# Patient Record
Sex: FEMALE | Race: WHITE | NOT HISPANIC OR LATINO | Employment: OTHER | URBAN - METROPOLITAN AREA
[De-identification: names, ages, dates, MRNs, and addresses within clinical notes are randomized per-mention and may not be internally consistent; named-entity substitution may affect disease eponyms.]

---

## 2017-03-02 ENCOUNTER — ALLSCRIPTS OFFICE VISIT (OUTPATIENT)
Dept: OTHER | Facility: OTHER | Age: 62
End: 2017-03-02

## 2017-03-02 DIAGNOSIS — I10 ESSENTIAL (PRIMARY) HYPERTENSION: ICD-10-CM

## 2017-05-02 DIAGNOSIS — R35.0 FREQUENCY OF MICTURITION: ICD-10-CM

## 2017-08-02 ENCOUNTER — ALLSCRIPTS OFFICE VISIT (OUTPATIENT)
Dept: OTHER | Facility: OTHER | Age: 62
End: 2017-08-02

## 2018-01-12 VITALS
BODY MASS INDEX: 28.93 KG/M2 | WEIGHT: 163.25 LBS | SYSTOLIC BLOOD PRESSURE: 120 MMHG | DIASTOLIC BLOOD PRESSURE: 78 MMHG | HEIGHT: 63 IN

## 2018-01-13 VITALS
BODY MASS INDEX: 27.89 KG/M2 | DIASTOLIC BLOOD PRESSURE: 76 MMHG | WEIGHT: 157.38 LBS | HEIGHT: 63 IN | SYSTOLIC BLOOD PRESSURE: 104 MMHG

## 2020-02-03 ENCOUNTER — OFFICE VISIT (OUTPATIENT)
Dept: FAMILY MEDICINE CLINIC | Facility: CLINIC | Age: 65
End: 2020-02-03
Payer: MEDICARE

## 2020-02-03 VITALS
BODY MASS INDEX: 24.11 KG/M2 | HEART RATE: 78 BPM | HEIGHT: 66 IN | OXYGEN SATURATION: 98 % | WEIGHT: 150 LBS | DIASTOLIC BLOOD PRESSURE: 74 MMHG | TEMPERATURE: 97.9 F | SYSTOLIC BLOOD PRESSURE: 118 MMHG

## 2020-02-03 DIAGNOSIS — F20.9 SCHIZOPHRENIA, UNSPECIFIED TYPE (HCC): ICD-10-CM

## 2020-02-03 DIAGNOSIS — G40.909 SEIZURE DISORDER (HCC): Primary | ICD-10-CM

## 2020-02-03 DIAGNOSIS — I10 ESSENTIAL HYPERTENSION: ICD-10-CM

## 2020-02-03 DIAGNOSIS — R22.0 SWELLING, MASS, OR LUMP ON FACE: ICD-10-CM

## 2020-02-03 PROCEDURE — 3008F BODY MASS INDEX DOCD: CPT | Performed by: FAMILY MEDICINE

## 2020-02-03 PROCEDURE — 3078F DIAST BP <80 MM HG: CPT | Performed by: FAMILY MEDICINE

## 2020-02-03 PROCEDURE — 99214 OFFICE O/P EST MOD 30 MIN: CPT | Performed by: FAMILY MEDICINE

## 2020-02-03 PROCEDURE — 1036F TOBACCO NON-USER: CPT | Performed by: FAMILY MEDICINE

## 2020-02-03 PROCEDURE — 3074F SYST BP LT 130 MM HG: CPT | Performed by: FAMILY MEDICINE

## 2020-02-03 NOTE — PROGRESS NOTES
Assessment/Plan:   will check lab work  Have her follow up with Neurology  Also have her follow-up with general surgery regarding the mass on the left side of her face  Return here in 6 months or as needed  Diagnoses and all orders for this visit:    Seizure disorder [facility])  -     Ambulatory referral to Neurology; Future    Essential hypertension  -     Comprehensive metabolic panel  -     CBC and differential  -     TSH, 3rd generation with Free T4 reflex  -     Carbamazepine level, total; Future    Swelling, mass, or lump on face  -     Ambulatory referral to General Surgery; Future    Schizophrenia, unspecified type (HCC)  -     TSH, 3rd generation with Free T4 reflex  -     Carbamazepine level, total; Future            Subjective:        Patient ID: Olga Pitts is a 60 y o  female  Patient presents with:  Seizures: possible seizure yesterday  Bad slurred speach and blue lips  The EMS examined her and did an EKG  She is still on carbamazepine  She has not seen a neurologist in some time  The following portions of the patient's history were reviewed and updated as appropriate: allergies, current medications, past family history, past medical history, past social history, past surgical history and problem list       Review of Systems   Constitutional: Negative  HENT: Negative  Eyes: Negative  Respiratory: Negative  Cardiovascular: Negative  Gastrointestinal: Negative  Endocrine: Negative  Genitourinary: Negative  Musculoskeletal: Negative  Skin: Negative  Allergic/Immunologic: Negative  Neurological: Negative  Hematological: Negative  Psychiatric/Behavioral: Negative  All other systems reviewed and are negative            Objective:             /74   Pulse 78   Temp 97 9 °F (36 6 °C)   Ht 5' 5 5" (1 664 m)   Wt 68 kg (150 lb)   SpO2 98%   BMI 24 58 kg/m²          Physical Exam   Constitutional: She is oriented to person, place, and time  She appears well-developed and well-nourished  HENT:   Head: Normocephalic and atraumatic  Right Ear: External ear normal    Left Ear: External ear normal    Nose: Nose normal    Mouth/Throat: Oropharynx is clear and moist    Eyes: Pupils are equal, round, and reactive to light  Conjunctivae and EOM are normal    Neck: Normal range of motion  Neck supple  Cardiovascular: Normal rate, regular rhythm and normal heart sounds  Pulmonary/Chest: Effort normal and breath sounds normal    Abdominal: Soft  Bowel sounds are normal    Musculoskeletal: Normal range of motion  Neurological: She is alert and oriented to person, place, and time  She has normal reflexes  No cranial nerve deficit  She exhibits normal muscle tone  Skin: Skin is warm and dry  Psychiatric: She has a normal mood and affect  Her behavior is normal    Nursing note and vitals reviewed

## 2020-02-04 ENCOUNTER — TELEPHONE (OUTPATIENT)
Dept: NEUROLOGY | Facility: CLINIC | Age: 65
End: 2020-02-04

## 2020-02-04 NOTE — TELEPHONE ENCOUNTER
Best contact number for patient: [phone]    Emergency Contact name and number: Genesis Mackey (sister) [phone] or [phone]    Referring provider: Favian Witt    Referring provider Telephone: [phone]    Primary Care Provider Name: Favian Witt    Reason for Appointment/Dx: [facility]    Neurology Location patient would like to be seen: First available    Order received? Yes                                                Records Received? Yes    Have you ever seen another Neurologist? Pt's jenifer said yes, over 20 years ago with LV  Insurance Information    Insurance Name: Medicare    ID/Policy #:    Secondary Insurance:    ID/Policy#: Workman's Comp/ Accident/ School  Information      Workman's Comp/Accident/School related?  No    If yes name of Insurance company:    Date of Injury:    Open Claim:     Name and Telephone Number:    Notes:                   Appointment date: 4/7/2020 with Dr Edwar Campuzano in [facility]

## 2020-02-20 ENCOUNTER — CONSULT (OUTPATIENT)
Dept: PLASTIC SURGERY | Facility: CLINIC | Age: 65
End: 2020-02-20
Payer: MEDICARE

## 2020-02-20 VITALS
DIASTOLIC BLOOD PRESSURE: 97 MMHG | TEMPERATURE: 97.5 F | BODY MASS INDEX: 24.56 KG/M2 | RESPIRATION RATE: 18 BRPM | HEIGHT: 65 IN | WEIGHT: 147.4 LBS | HEART RATE: 95 BPM | SYSTOLIC BLOOD PRESSURE: 135 MMHG

## 2020-02-20 DIAGNOSIS — R22.0 SWELLING, MASS, OR LUMP ON FACE: ICD-10-CM

## 2020-02-20 DIAGNOSIS — R22.9 SUBCUTANEOUS MASS: Primary | ICD-10-CM

## 2020-02-20 PROCEDURE — 99204 OFFICE O/P NEW MOD 45 MIN: CPT | Performed by: SURGERY

## 2020-02-20 NOTE — PROGRESS NOTES
Assessment/Plan:  Please see HPI  The left cheek mass has been present for approximately 2 months, is asymptomatic  I have recommended imaging prior to surgical intervention, an MRI will be arranged to determine there is parotid gland involvement  Her sister is to call our office once the MRI has been completed, it will be reviewed and next steps will be determined based on the MRI findings  Subjective:   Left cheek mass   Patient ID: Renato Jones is a 60 y o  female  ROBIN Baxter is a 81-year-old female, referred by Dr Josie Cheng for evaluation of a left cheek mass  She is accompanied by her sister today who serves as historian/advocate  Per Elvi's  sister, Cornell Drake, the mass has been present for approximately 2 months, it is stable in size, it has not drained, and is asymptomatic  The following portions of the patient's history were reviewed and updated as appropriate: allergies, current medications, past family history, past medical history, past social history, past surgical history and problem list     Review of Systems   Constitutional: Negative for chills and fever  HENT: Negative for hearing loss  Eyes: Positive for visual disturbance  Negative for discharge  Respiratory: Negative for chest tightness and shortness of breath  Cardiovascular: Negative for chest pain and leg swelling  Gastrointestinal: Negative for blood in stool, constipation, diarrhea and nausea  Genitourinary: Negative for dysuria  Musculoskeletal: Negative for gait problem  Skin: Negative for rash  Allergic/Immunologic: Negative for immunocompromised state  Neurological: Positive for seizures  Negative for headaches  Hematological: Does not bruise/bleed easily  Psychiatric/Behavioral: Negative for dysphoric mood  The patient is nervous/anxious            Objective:      /97   Pulse 95   Temp 97 5 °F (36 4 °C)   Resp 18   Ht 5' 5" (1 651 m)   Wt 66 9 kg (147 lb 6 4 oz)   BMI 24 53 kg/m²          Physical Exam   Constitutional: She appears well-developed  HENT:   Head: Normocephalic  Eyes: Pupils are equal, round, and reactive to light  Neck: Normal range of motion  Cardiovascular: Normal rate  Pulmonary/Chest: Effort normal    Abdominal: Soft  Musculoskeletal: Normal range of motion  Neurological: She is alert  Skin: Skin is warm  Left cheek mass at the angle of mandible, 3-3 5 cm in diameter, smooth, firm, nontender, mobile   Psychiatric: She has a normal mood and affect

## 2020-02-20 NOTE — LETTER
February 20, 2020     Moiz White, [address] [address]    Patient: Ar Johnson   YOB: 1955   Date of Visit: 2/20/2020       Dear Dr Vidya Ochoa: Thank you for referring Jewell Steiner to me for evaluation  Below are my notes for this consultation  If you have questions, please do not hesitate to call me  I look forward to following your patient along with you  Sincerely,        Kulwant Fitzgerald MD        CC: No Recipients  Kulwant Fitzgerald MD  2/20/2020  9:39 AM  Incomplete  Assessment/Plan:  Please see HPI  The left cheek mass has been present for approximately 2 months, is asymptomatic  I have recommended imaging prior to surgical intervention, an MRI will be arranged to determine there is parotid gland involvement  Her sister is to call our office once the MRI has been completed, it will be reviewed and next steps will be determined based on the MRI findings  Subjective:   Left cheek mass   Patient ID: Ar Johnson is a 60 y o  female  HPI Artur David is a 24-year-old female, referred by Dr Vidya Ochoa for evaluation of a left cheek mass  She is accompanied by her sister today who serves as historian/advocate  Per Elvi's  sister, John Hickey, the mass has been present for approximately 2 months, it is stable in size, it has not drained, and is asymptomatic  The following portions of the patient's history were reviewed and updated as appropriate: allergies, current medications, past family history, past medical history, past social history, past surgical history and problem list     Review of Systems   Constitutional: Negative for chills and fever  HENT: Negative for hearing loss  Eyes: Positive for visual disturbance  Negative for discharge  Respiratory: Negative for chest tightness and shortness of breath  Cardiovascular: Negative for chest pain and leg swelling     Gastrointestinal: Negative for blood in stool, constipation, diarrhea and nausea  Genitourinary: Negative for dysuria  Musculoskeletal: Negative for gait problem  Skin: Negative for rash  Allergic/Immunologic: Negative for immunocompromised state  Neurological: Positive for seizures  Negative for headaches  Hematological: Does not bruise/bleed easily  Psychiatric/Behavioral: Negative for dysphoric mood  The patient is nervous/anxious  Objective:      /97   Pulse 95   Temp 97 5 °F (36 4 °C)   Resp 18   Ht 5' 5" (1 651 m)   Wt 66 9 kg (147 lb 6 4 oz)   BMI 24 53 kg/m²           Physical Exam   Constitutional: She appears well-developed  HENT:   Head: Normocephalic  Eyes: Pupils are equal, round, and reactive to light  Neck: Normal range of motion  Cardiovascular: Normal rate  Pulmonary/Chest: Effort normal    Abdominal: Soft  Musculoskeletal: Normal range of motion  Neurological: She is alert  Skin: Skin is warm  Left cheek mass at the angle of mandible, 3-3 5 cm in diameter, smooth, firm, nontender, mobile   Psychiatric: She has a normal mood and affect  Juwan Moody MD  2/20/2020  9:39 AM  Sign at close encounter  Assessment/Plan:  Please see HPI  The left cheek mass has been present for approximately 2 months, is asymptomatic  I have recommended imaging prior to surgical intervention, an MRI will be arranged to determine there is parotid gland involvement  Her sister is to call our office once the MRI has been completed, it will be reviewed and next steps will be determined based on the MRI findings  Diagnoses and all orders for this visit:    Swelling, mass, or lump on face  -     Ambulatory referral to General Surgery  -     MRI face wo contrast; Future          Subjective:   Left cheek mass   Patient ID: Pollo Curtis is a 60 y o  female  HPI Yessenia Gagnon is a 56-year-old female, referred by Dr Alexa Thibodeaux for evaluation of a left cheek mass     She is accompanied by her sister today who serves as historian/advocate  Per Elvi's  sister, Adri, the mass has been present for approximately 2 months, it is stable in size, it has not drained, and is asymptomatic  The following portions of the patient's history were reviewed and updated as appropriate: allergies, current medications, past family history, past medical history, past social history, past surgical history and problem list     Review of Systems   Constitutional: Negative for chills and fever  HENT: Negative for hearing loss  Eyes: Positive for visual disturbance  Negative for discharge  Respiratory: Negative for chest tightness and shortness of breath  Cardiovascular: Negative for chest pain and leg swelling  Gastrointestinal: Negative for blood in stool, constipation, diarrhea and nausea  Genitourinary: Negative for dysuria  Musculoskeletal: Negative for gait problem  Skin: Negative for rash  Allergic/Immunologic: Negative for immunocompromised state  Neurological: Positive for seizures  Negative for headaches  Hematological: Does not bruise/bleed easily  Psychiatric/Behavioral: Negative for dysphoric mood  The patient is nervous/anxious  Objective:      /97   Pulse 95   Temp 97 5 °F (36 4 °C)   Resp 18   Ht 5' 5" (1 651 m)   Wt 66 9 kg (147 lb 6 4 oz)   BMI 24 53 kg/m²           Physical Exam   Constitutional: She appears well-developed  HENT:   Head: Normocephalic  Eyes: Pupils are equal, round, and reactive to light  Neck: Normal range of motion  Cardiovascular: Normal rate  Pulmonary/Chest: Effort normal    Abdominal: Soft  Musculoskeletal: Normal range of motion  Neurological: She is alert  Skin: Skin is warm  Left cheek mass at the angle of mandible, 3-3 5 cm in diameter, smooth, firm, nontender, mobile   Psychiatric: She has a normal mood and affect

## 2020-03-01 ENCOUNTER — HOSPITAL ENCOUNTER (OUTPATIENT)
Dept: RADIOLOGY | Facility: HOSPITAL | Age: 65
Discharge: HOME | End: 2020-03-01
Attending: SURGERY
Payer: MEDICARE

## 2020-03-01 DIAGNOSIS — R22.0 SWELLING, MASS, OR LUMP ON FACE: ICD-10-CM

## 2020-03-01 PROCEDURE — 70543 MRI ORBT/FAC/NCK W/O &W/DYE: CPT

## 2020-03-01 PROCEDURE — A9585 GADOBUTROL INJECTION: HCPCS | Performed by: SURGERY

## 2020-03-01 RX ADMIN — GADOBUTROL 7 ML: 604.72 INJECTION INTRAVENOUS at 12:30

## 2020-03-04 ENCOUNTER — TELEPHONE (OUTPATIENT)
Dept: PLASTIC SURGERY | Facility: CLINIC | Age: 65
End: 2020-03-04

## 2020-03-04 DIAGNOSIS — K11.8 PAROTID MASS: Primary | ICD-10-CM

## 2020-03-04 NOTE — TELEPHONE ENCOUNTER
Spoke with patient's sister to give her the information on the referral we put in to ENT  She was supplied with the phone number and names of the providers there  She was very grateful

## 2020-04-28 ENCOUNTER — TELEPHONE (OUTPATIENT)
Dept: NEUROLOGY | Facility: CLINIC | Age: 65
End: 2020-04-28

## 2021-01-14 ENCOUNTER — ANESTHESIA EVENT (INPATIENT)
Dept: PERIOP | Facility: HOSPITAL | Age: 66
DRG: 521 | End: 2021-01-14
Payer: MEDICARE

## 2021-01-14 ENCOUNTER — APPOINTMENT (EMERGENCY)
Dept: RADIOLOGY | Facility: HOSPITAL | Age: 66
DRG: 521 | End: 2021-01-14
Payer: MEDICARE

## 2021-01-14 ENCOUNTER — HOSPITAL ENCOUNTER (INPATIENT)
Facility: HOSPITAL | Age: 66
LOS: 4 days | DRG: 521 | End: 2021-01-18
Attending: EMERGENCY MEDICINE | Admitting: INTERNAL MEDICINE
Payer: MEDICARE
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
## 2021-01-14 DIAGNOSIS — F20.9 SCHIZOPHRENIA, UNSPECIFIED TYPE (HCC): ICD-10-CM

## 2021-01-14 DIAGNOSIS — S72.001A CLOSED FRACTURE OF RIGHT HIP, INITIAL ENCOUNTER (HCC): Primary | ICD-10-CM

## 2021-01-14 PROBLEM — S72.91XA FEMUR FRACTURE, RIGHT (HCC): Status: ACTIVE | Noted: 2021-01-14

## 2021-01-14 PROBLEM — Z01.818 PRE-OP EVALUATION: Status: ACTIVE | Noted: 2021-01-14

## 2021-01-14 LAB
ANION GAP SERPL CALCULATED.3IONS-SCNC: 6 MMOL/L (ref 4–13)
ATRIAL RATE: 82 BPM
BASOPHILS # BLD AUTO: 0.05 THOUSANDS/ΜL (ref 0–0.1)
BASOPHILS NFR BLD AUTO: 0 % (ref 0–1)
BUN SERPL-MCNC: 10 MG/DL (ref 5–25)
CALCIUM SERPL-MCNC: 8.8 MG/DL (ref 8.3–10.1)
CHLORIDE SERPL-SCNC: 101 MMOL/L (ref 100–108)
CO2 SERPL-SCNC: 28 MMOL/L (ref 21–32)
CREAT SERPL-MCNC: 0.62 MG/DL (ref 0.6–1.3)
EOSINOPHIL # BLD AUTO: 0 THOUSAND/ΜL (ref 0–0.61)
EOSINOPHIL NFR BLD AUTO: 0 % (ref 0–6)
ERYTHROCYTE [DISTWIDTH] IN BLOOD BY AUTOMATED COUNT: 13.2 % (ref 11.6–15.1)
GFR SERPL CREATININE-BSD FRML MDRD: 96 ML/MIN/1.73SQ M
GLUCOSE SERPL-MCNC: 100 MG/DL (ref 65–140)
HCT VFR BLD AUTO: 36.7 % (ref 34.8–46.1)
HGB BLD-MCNC: 12.5 G/DL (ref 11.5–15.4)
IMM GRANULOCYTES # BLD AUTO: 0.14 THOUSAND/UL (ref 0–0.2)
IMM GRANULOCYTES NFR BLD AUTO: 1 % (ref 0–2)
LYMPHOCYTES # BLD AUTO: 0.37 THOUSANDS/ΜL (ref 0.6–4.47)
LYMPHOCYTES NFR BLD AUTO: 2 % (ref 14–44)
MCH RBC QN AUTO: 32.8 PG (ref 26.8–34.3)
MCHC RBC AUTO-ENTMCNC: 34.1 G/DL (ref 31.4–37.4)
MCV RBC AUTO: 96 FL (ref 82–98)
MONOCYTES # BLD AUTO: 0.56 THOUSAND/ΜL (ref 0.17–1.22)
MONOCYTES NFR BLD AUTO: 4 % (ref 4–12)
NEUTROPHILS # BLD AUTO: 14.56 THOUSANDS/ΜL (ref 1.85–7.62)
NEUTS SEG NFR BLD AUTO: 93 % (ref 43–75)
NRBC BLD AUTO-RTO: 0 /100 WBCS
P AXIS: 30 DEGREES
PLATELET # BLD AUTO: 207 THOUSANDS/UL (ref 149–390)
PMV BLD AUTO: 8.1 FL (ref 8.9–12.7)
POTASSIUM SERPL-SCNC: 3.7 MMOL/L (ref 3.5–5.3)
PR INTERVAL: 136 MS
QRS AXIS: 33 DEGREES
QRSD INTERVAL: 92 MS
QT INTERVAL: 368 MS
QTC INTERVAL: 429 MS
RBC # BLD AUTO: 3.81 MILLION/UL (ref 3.81–5.12)
SODIUM SERPL-SCNC: 135 MMOL/L (ref 136–145)
T WAVE AXIS: 51 DEGREES
VENTRICULAR RATE: 82 BPM
WBC # BLD AUTO: 15.68 THOUSAND/UL (ref 4.31–10.16)

## 2021-01-14 PROCEDURE — 87040 BLOOD CULTURE FOR BACTERIA: CPT | Performed by: PHYSICIAN ASSISTANT

## 2021-01-14 PROCEDURE — 99223 1ST HOSP IP/OBS HIGH 75: CPT | Performed by: INTERNAL MEDICINE

## 2021-01-14 PROCEDURE — 36415 COLL VENOUS BLD VENIPUNCTURE: CPT | Performed by: PHYSICIAN ASSISTANT

## 2021-01-14 PROCEDURE — 84145 PROCALCITONIN (PCT): CPT | Performed by: PHYSICIAN ASSISTANT

## 2021-01-14 PROCEDURE — 0241U HB NFCT DS VIR RESP RNA 4 TRGT: CPT | Performed by: PHYSICIAN ASSISTANT

## 2021-01-14 PROCEDURE — 80048 BASIC METABOLIC PNL TOTAL CA: CPT | Performed by: PHYSICIAN ASSISTANT

## 2021-01-14 PROCEDURE — 93010 ELECTROCARDIOGRAM REPORT: CPT | Performed by: INTERNAL MEDICINE

## 2021-01-14 PROCEDURE — 99285 EMERGENCY DEPT VISIT HI MDM: CPT | Performed by: PHYSICIAN ASSISTANT

## 2021-01-14 PROCEDURE — 96374 THER/PROPH/DIAG INJ IV PUSH: CPT

## 2021-01-14 PROCEDURE — 83605 ASSAY OF LACTIC ACID: CPT | Performed by: PHYSICIAN ASSISTANT

## 2021-01-14 PROCEDURE — 73502 X-RAY EXAM HIP UNI 2-3 VIEWS: CPT

## 2021-01-14 PROCEDURE — 85025 COMPLETE CBC W/AUTO DIFF WBC: CPT | Performed by: PHYSICIAN ASSISTANT

## 2021-01-14 PROCEDURE — 71045 X-RAY EXAM CHEST 1 VIEW: CPT

## 2021-01-14 PROCEDURE — 99285 EMERGENCY DEPT VISIT HI MDM: CPT

## 2021-01-14 PROCEDURE — 93005 ELECTROCARDIOGRAM TRACING: CPT

## 2021-01-14 PROCEDURE — 73552 X-RAY EXAM OF FEMUR 2/>: CPT

## 2021-01-14 PROCEDURE — 81003 URINALYSIS AUTO W/O SCOPE: CPT | Performed by: PHYSICIAN ASSISTANT

## 2021-01-14 RX ORDER — CARBAMAZEPINE 200 MG/1
400 TABLET ORAL 2 TIMES DAILY
COMMUNITY

## 2021-01-14 RX ORDER — LIDOCAINE 50 MG/G
1 PATCH TOPICAL ONCE
Status: COMPLETED | OUTPATIENT
Start: 2021-01-14 | End: 2021-01-15

## 2021-01-14 RX ORDER — OLANZAPINE 10 MG/1
40 TABLET ORAL
Status: DISCONTINUED | OUTPATIENT
Start: 2021-01-14 | End: 2021-01-18 | Stop reason: HOSPADM

## 2021-01-14 RX ORDER — ACETAMINOPHEN 325 MG/1
650 TABLET ORAL ONCE
Status: DISCONTINUED | OUTPATIENT
Start: 2021-01-14 | End: 2021-01-14

## 2021-01-14 RX ORDER — ACETAMINOPHEN 325 MG/1
650 TABLET ORAL EVERY 6 HOURS PRN
Status: DISCONTINUED | OUTPATIENT
Start: 2021-01-14 | End: 2021-01-15

## 2021-01-14 RX ORDER — OXYCODONE HYDROCHLORIDE 5 MG/1
2.5 TABLET ORAL EVERY 4 HOURS PRN
Status: DISCONTINUED | OUTPATIENT
Start: 2021-01-14 | End: 2021-01-18 | Stop reason: HOSPADM

## 2021-01-14 RX ORDER — FENTANYL CITRATE 50 UG/ML
25 INJECTION, SOLUTION INTRAMUSCULAR; INTRAVENOUS ONCE
Status: COMPLETED | OUTPATIENT
Start: 2021-01-14 | End: 2021-01-14

## 2021-01-14 RX ORDER — CARBAMAZEPINE 200 MG/1
400 TABLET ORAL 2 TIMES DAILY
Status: DISCONTINUED | OUTPATIENT
Start: 2021-01-14 | End: 2021-01-18 | Stop reason: HOSPADM

## 2021-01-14 RX ORDER — CEFAZOLIN SODIUM 2 G/50ML
2000 SOLUTION INTRAVENOUS
Status: COMPLETED | OUTPATIENT
Start: 2021-01-15 | End: 2021-01-15

## 2021-01-14 RX ADMIN — CARBAMAZEPINE 400 MG: 200 TABLET ORAL at 21:55

## 2021-01-14 RX ADMIN — OXYCODONE HYDROCHLORIDE 2.5 MG: 5 TABLET ORAL at 21:55

## 2021-01-14 RX ADMIN — MORPHINE SULFATE 1 MG: 2 INJECTION, SOLUTION INTRAMUSCULAR; INTRAVENOUS at 20:18

## 2021-01-14 RX ADMIN — OLANZAPINE 40 MG: 10 TABLET, FILM COATED ORAL at 21:55

## 2021-01-14 RX ADMIN — LIDOCAINE 1 PATCH: 50 PATCH TOPICAL at 14:47

## 2021-01-14 RX ADMIN — OXYCODONE HYDROCHLORIDE 2.5 MG: 5 TABLET ORAL at 18:06

## 2021-01-14 RX ADMIN — FENTANYL CITRATE 25 MCG: 50 INJECTION INTRAMUSCULAR; INTRAVENOUS at 15:02

## 2021-01-14 NOTE — H&P
H&P- Juan C Webb 1955, 63 y o  female MRN: [id number]    Unit/Bed#: E2 -71 Encounter: [id number]    Primary Care Provider: Nae Sultana DO   Date and time admitted to hospital: 1/14/2021  1:19 PM        * Femur fracture, right [facility])  Assessment & Plan  This is a 62-year-old female with history of schizophrenia and seizure disorder presented after suffering a mechanical fall at home when she tripped over her dog  · X-ray revealed right femoral neck fracture  · Orthopedic consulted  · Plan for OR tomorrow  · Continue with pain control  · PT/OT after OR    Pre-op evaluation  Assessment & Plan  This is a 62-year-old female with history of schizophrenia and seizure disorder 20 after serving a mechanical fall at home  As a right femur fracture  No known history of any CAD, arrhythmias, denies any chest pain  Chest x-ray and EKG reviewed  Patient can proceed to OR without further workup at this point    Seizure disorder [facility])  Assessment & Plan  · Continue Tegretol    Schizophrenia ([facility] [address] )  Assessment & Plan  · Continue Zyprexa      Updated patient's sister who states she is the POA, via telephone      VTE Prophylaxis: Enoxaparin (Lovenox)  / sequential compression device   Code Status: DNR/DNI--discussed with sister  POLST: There is no POLST form on file for this patient (pre-hospital)    Anticipated Length of Stay:  Patient will be admitted on an Inpatient basis with an anticipated length of stay of  greater than 2 midnights  Justification for Hospital Stay:  Right femur fracture    Total Time for Visit, including Counseling / Coordination of Care: 30 minutes  Greater than 50% of this total time spent on direct patient counseling and coordination of care  Chief Complaint:   Mechanical fall    History of Present Illness:    Juan C Webb is a 63 y o  female who presents with mechanical fall at home    Patient history of schizophrenia, seizure disorder presenting after suffering a mechanical fall at home when she tripped over her dog  Suffered a right femoral neck fracture  Denies any chest pain, palpitation, December denies any abdominal pain, nausea, vomiting  Denies any fever, chills  Patient will be admitted for further management and evaluation  Review of Systems:    Review of Systems   Constitutional: Negative  HENT: Negative  Eyes: Negative  Respiratory: Negative  Cardiovascular: Negative  Gastrointestinal: Negative  Endocrine: Negative  Genitourinary: Negative  Musculoskeletal: Positive for joint swelling  Skin: Negative  Allergic/Immunologic: Negative  Neurological: Negative  Hematological: Negative  Psychiatric/Behavioral: Negative  Past Medical and Surgical History:     Past Medical History:   Diagnosis Date    Schizophrenia [facility])        Past Surgical History:   Procedure Laterality Date    BRAIN SURGERY         Meds/Allergies:    Prior to Admission medications    Medication Sig Start Date End Date Taking? Authorizing Provider   carBAMazepine (TEGretol) 200 mg tablet Take 400 mg by mouth 2 (two) times a day   Yes Historical Provider, MD   OLANZapine (ZYPREXA) 20 MG tablet Take 40 mg by mouth daily at bedtime  4/5/11   Historical Provider, MD   carBAMazepine (CARBATROL) 300 MG 12 hr capsule Take 1 capsule by mouth 2 (two) times a day  1/14/21  Historical Provider, MD   LORazepam (ATIVAN) 1 mg tablet Take 1 tablet by mouth 2 (two) times a day  1/14/21  Historical Provider, MD     I have reviewed home medications with patient family member  Allergies:    Allergies   Allergen Reactions    Penicillins        Social History:     Social History     Substance and Sexual Activity   Alcohol Use Not Currently     Social History     Tobacco Use   Smoking Status Never Smoker   Smokeless Tobacco Never Used   Tobacco Comment    onset date: 8/2/2017      Social History     Substance and Sexual Activity   Drug Use Never       Family History:    Family History   Problem Relation Age of Onset    Other Mother         Cardiac Arrest     Other Father         cardiac Arrest        Physical Exam:     Vitals:   Blood Pressure: 155/96 (01/14/21 1519)  Pulse: 85 (01/14/21 1519)  Temperature: 98 7 °F (37 1 °C) (01/14/21 1519)  Temp Source: Oral (01/14/21 1519)  Respirations: 16 (01/14/21 1519)  Weight - Scale: 66 kg (145 lb 8 1 oz) (01/14/21 1334)  SpO2: 98 % (01/14/21 1519)    Constitutional: Patient is oriented to person, place and time, no acute distress  HEENT:  Normocephalic, atraumatic  Cardiovascular: Normal S1S2, RRR, No murmurs/rubs/gallops appreciated  Pulmonary:  Bilateral air entry, No rhonchi/rales/wheezing appreciated  Abdominal: Soft, Bowel sounds present, Non-tender, Non-distended  Extremities:  Right lower extremity shortened and externally rotated  Neurological: Cranial nerves II-XII grossly intact, sensation intact, otherwise no focal neurological symptoms  Additional Data:     Lab Results: I have personally reviewed pertinent reports  Results from last 7 days   Lab Units 01/14/21  1501   WBC Thousand/uL 15 68*   HEMOGLOBIN g/dL 12 5   HEMATOCRIT % 36 7   PLATELETS Thousands/uL 207   NEUTROS PCT % 93*   LYMPHS PCT % 2*   MONOS PCT % 4   EOS PCT % 0     Results from last 7 days   Lab Units 01/14/21  1501   POTASSIUM mmol/L 3 7   CHLORIDE mmol/L 101   CO2 mmol/L 28   BUN mg/dL 10   CREATININE mg/dL 0 62   CALCIUM mg/dL 8 8           Imaging: I have personally reviewed pertinent reports  Xr Hip/pelv 2-3 Vws Right    Result Date: 1/14/2021  Narrative: RIGHT HIP INDICATION:   hip pain after fall  COMPARISON:  None VIEWS:  XR HIP/PELV 2-3 VWS RIGHT W PELVIS IF PERFORMED FINDINGS: Femoral neck fracture is present  No dislocation  Moderate left and mild right hip osteoarthritis are present  No lytic or blastic osseous lesion  There are atherosclerotic calcifications  Soft tissues are otherwise unremarkable   The visualized lumbar spine is unremarkable  Impression: Right femoral neck fracture  Findings concur with the preliminary report by the referring clinician already in PACS and/or our electronic record EPIC  Workstation performed: [id number]     Xr Femur 2 Views Right    Result Date: 1/14/2021  Narrative: RIGHT FEMUR INDICATION:   right thigh pain after fall  COMPARISON:  None VIEWS:  XR FEMUR 2 VW RIGHT FINDINGS: Femoral neck fracture is present  Mild right hip and marked right knee osteoarthritis are present  No lytic or blastic osseous lesion  Soft tissues are unremarkable  Impression: Right femoral neck fracture  Findings concur with the preliminary report by the referring clinician already in PACS and/or our electronic record EPIC  Workstation performed: [id number]     Xr Chest 1 View    Result Date: 1/14/2021  Narrative: CHEST INDICATION:   FALL THIS MORNING   COMPARISON:  None EXAM PERFORMED/VIEWS:  XR CHEST 1 VIEW FINDINGS:  There is aortic knob calcification  Cardiomediastinal silhouette appears unremarkable  The lungs are clear  No pneumothorax or pleural effusion  Osseous structures appear within normal limits for patient age  Impression: No focal airspace consolidation identified  Workstation performed: [id number]       EKG, Pathology, and Other Studies Reviewed on Admission:   · EKG:  Sinus rhythm without any acute ST changes    Allscripts / Epic Records Reviewed: Yes     ** Please Note: This note has been constructed using a voice recognition system   **

## 2021-01-14 NOTE — PLAN OF CARE
Problem: PAIN - ADULT  Goal: Verbalizes/displays adequate comfort level or baseline comfort level  Description: Interventions:  - Encourage patient to monitor pain and request assistance  - Assess pain using appropriate pain scale  - Administer analgesics based on type and severity of pain and evaluate response  - Implement non-pharmacological measures as appropriate and evaluate response  - Consider cultural and social influences on pain and pain management  - Notify physician/advanced practitioner if interventions unsuccessful or patient reports new pain  Outcome: Progressing     Problem: INFECTION - ADULT  Goal: Absence or prevention of progression during hospitalization  Description: INTERVENTIONS:  - Assess and monitor for signs and symptoms of infection  - Monitor lab/diagnostic results  - Monitor all insertion sites, i e  indwelling lines, tubes, and drains  - Monitor endotracheal if appropriate and nasal secretions for changes in amount and color  - [place] appropriate cooling/warming therapies per order  - Administer medications as ordered  - Instruct and encourage patient and family to use good hand hygiene technique  - Identify and instruct in appropriate isolation precautions for identified infection/condition  Outcome: Progressing  Goal: Absence of fever/infection during neutropenic period  Description: INTERVENTIONS:  - Monitor WBC    Outcome: Progressing     Problem: SAFETY ADULT  Goal: Patient will remain free of falls  Description: INTERVENTIONS:  - Assess patient frequently for physical needs  -  Identify cognitive and physical deficits and behaviors that affect risk of falls    -  [place] fall precautions as indicated by assessment   - Educate patient/family on patient safety including physical limitations  - Instruct patient to call for assistance with activity based on assessment  - Modify environment to reduce risk of injury  - Consider OT/PT consult to assist with strengthening/mobility  Outcome: Progressing  Goal: Maintain or return to baseline ADL function  Description: INTERVENTIONS:  -  Assess patient's ability to carry out ADLs; assess patient's baseline for ADL function and identify physical deficits which impact ability to perform ADLs (bathing, care of mouth/teeth, toileting, grooming, dressing, etc )  - Assess/evaluate cause of self-care deficits   - Assess range of motion  - Assess patient's mobility; develop plan if impaired  - Assess patient's need for assistive devices and provide as appropriate  - Encourage maximum independence but intervene and supervise when necessary  - Involve family in performance of ADLs  - Assess for home care needs following discharge   - Consider OT consult to assist with ADL evaluation and planning for discharge  - Provide patient education as appropriate  Outcome: Progressing  Goal: Maintain or return mobility status to optimal level  Description: INTERVENTIONS:  - Assess patient's baseline mobility status (ambulation, transfers, stairs, etc )    - Identify cognitive and physical deficits and behaviors that affect mobility  - Identify mobility aids required to assist with transfers and/or ambulation (gait belt, sit-to-stand, lift, walker, cane, etc )  - [place] fall precautions as indicated by assessment  - Record patient progress and toleration of activity level on Mobility SBAR; progress patient to next Phase/Stage  - Instruct patient to call for assistance with activity based on assessment  - Consider rehabilitation consult to assist with strengthening/weightbearing, etc   Outcome: Progressing     Problem: DISCHARGE PLANNING  Goal: Discharge to home or other facility with appropriate resources  Description: INTERVENTIONS:  - Identify barriers to discharge w/patient and caregiver  - Arrange for needed discharge resources and transportation as appropriate  - Identify discharge learning needs (meds, wound care, etc )  - Arrange for interpretive services to assist at discharge as needed  - Refer to Case Management Department for coordinating discharge planning if the patient needs post-hospital services based on physician/advanced practitioner order or complex needs related to functional status, cognitive ability, or social support system  Outcome: Progressing     Problem: Knowledge Deficit  Goal: Patient/family/caregiver demonstrates understanding of disease process, treatment plan, medications, and discharge instructions  Description: Complete learning assessment and assess knowledge base    Interventions:  - Provide teaching at level of understanding  - Provide teaching via preferred learning methods  Outcome: Progressing

## 2021-01-14 NOTE — ASSESSMENT & PLAN NOTE
This is a 80-year-old female with history of schizophrenia and seizure disorder presented after suffering a mechanical fall at home when she tripped over her dog      · X-ray revealed right femoral neck fracture  · Orthopedic consulted  · Plan for OR tomorrow  · Continue with pain control  · PT/OT after OR

## 2021-01-14 NOTE — ED PROVIDER NOTES
History  Chief Complaint   Patient presents with    Fall     Patient tripped over her dog and fell  States she landed on her right hip  Denies striking her head  Patient is a 62 y/o female presenting to the ED with right hip pain after a fall  Patient reports tripping over her dog and falling onto right hip  She denies any  Dizziness, chest pain or SOB precipitating the fall; denies head strike, blood thinners, loss of consciousness, neck/back pain or any additional injuries  Patient's sister was able to assist patient off the floor and into a chair, but called EMS as patient was unable to bear weight after fall  Patient localizes the pain to the lateral aspect of right upper thigh and rates it at a 10/10  Pain has been constant since fall and is worse with movement  She denies numbness or tingling in affected extremity  She denies headache, dizziness, N/V or visual changes since the fall  Prior to Admission Medications   Prescriptions Last Dose Informant Patient Reported? Taking? OLANZapine (ZYPREXA) 20 MG tablet   Yes No   Sig: Take 40 mg by mouth daily at bedtime    carBAMazepine (TEGretol) 200 mg tablet   Yes Yes   Sig: Take 400 mg by mouth 2 (two) times a day      Facility-Administered Medications: None       Past Medical History:   Diagnosis Date    Schizophrenia [facility])        Past Surgical History:   Procedure Laterality Date    BRAIN SURGERY         Family History   Problem Relation Age of Onset    Other Mother         Cardiac Arrest     Other Father         cardiac Arrest      I have reviewed and agree with the history as documented  E-Cigarette/Vaping     E-Cigarette/Vaping Substances     Social History     Tobacco Use    Smoking status: Never Smoker    Smokeless tobacco: Never Used    Tobacco comment: onset date: 8/2/2017    Substance Use Topics    Alcohol use: Not Currently    Drug use: Never       Review of Systems   Constitutional: Negative for chills and fever  Respiratory: Negative for cough and shortness of breath  Cardiovascular: Negative for chest pain  Gastrointestinal: Negative for abdominal pain, constipation, diarrhea, nausea and vomiting  Musculoskeletal: Negative for back pain and neck pain  Neurological: Negative for dizziness, syncope and headaches  All other systems reviewed and are negative  Physical Exam  Physical Exam  Vitals signs and nursing note reviewed  Constitutional:       General: She is awake  Appearance: Normal appearance  She is well-developed  HENT:      Head: Normocephalic and atraumatic  No abrasion, contusion or laceration  Right Ear: No drainage  Left Ear: No drainage  Nose: Nose normal       Mouth/Throat:      Lips: Pink  Eyes:      Extraocular Movements: Extraocular movements intact  Conjunctiva/sclera: Conjunctivae normal       Pupils: Pupils are equal, round, and reactive to light  Neck:      Musculoskeletal: Normal range of motion and neck supple  No pain with movement, spinous process tenderness or muscular tenderness  Cardiovascular:      Rate and Rhythm: Normal rate and regular rhythm  Pulses:           Dorsalis pedis pulses are 2+ on the right side  Heart sounds: Normal heart sounds, S1 normal and S2 normal    Pulmonary:      Effort: Pulmonary effort is normal       Breath sounds: Normal breath sounds  Musculoskeletal:      Right hip: She exhibits decreased range of motion, tenderness and bony tenderness  She exhibits no swelling, no deformity and no laceration  Left hip: Normal       Right knee: Normal       Cervical back: She exhibits no tenderness, no bony tenderness, no deformity and no pain  Thoracic back: She exhibits no tenderness, no bony tenderness, no deformity and no pain  Lumbar back: She exhibits no tenderness, no bony tenderness, no deformity and no pain        Comments: Right hip: no ecchymosis or deformities noted; no shortening or external rotation of extremity; tenderness to palpation of lateral aspect of upper 1/3 of femur; neurovascularly intact distal to injury; good strength in foot but limited strength in leg secondary to pain  Spine: no midline tenderness, step-off's or deformities  Skin:     General: Skin is warm and dry  Neurological:      Mental Status: She is alert and oriented to person, place, and time  Mental status is at baseline  GCS: GCS eye subscore is 4  GCS verbal subscore is 5  GCS motor subscore is 6  Motor: Motor function is intact  Psychiatric:         Behavior: Behavior is cooperative  Vital Signs  ED Triage Vitals [01/14/21 1328]   Temperature Pulse Respirations Blood Pressure SpO2   100 2 °F (37 9 °C) 88 16 (!) 200/102 96 %      Temp Source Heart Rate Source Patient Position - Orthostatic VS BP Location FiO2 (%)   Temporal Monitor Sitting Right arm --      Pain Score       Worst Possible Pain           Vitals:    01/14/21 1328   BP: (!) 200/102   Pulse: 88   Patient Position - Orthostatic VS: Sitting         Visual Acuity      ED Medications  Medications   lidocaine (LIDODERM) 5 % patch 1 patch (1 patch Topical Medication Applied 1/14/21 1447)   fentanyl citrate (PF) 100 MCG/2ML 25 mcg (25 mcg Intravenous Given 1/14/21 1502)       Diagnostic Studies  Results Reviewed     Procedure Component Value Units Date/Time    CBC and differential [[id number]]  (Abnormal) Collected: 01/14/21 1501    Lab Status: Preliminary result Specimen: Blood from Arm, Right Updated: 01/14/21 1505     WBC 15 68 Thousand/uL      RBC 3 81 Million/uL      Hemoglobin 12 5 g/dL      Hematocrit 36 7 %      MCV 96 fL      MCH 32 8 pg      MCHC 34 1 g/dL      RDW 13 2 %      MPV 8 1 fL      Platelets 188 Thousands/uL     Basic metabolic panel [[id number]] Collected: 01/14/21 1501    Lab Status:  In process Specimen: Blood from Arm, Right Updated: 01/14/21 1503                 XR hip/pelv 2-3 vws right    (Results Pending)   XR femur 2 views RIGHT    (Results Pending)   XR chest 1 view    (Results Pending)              Procedures  Procedures         ED Course  ED Course as of Jan 14 1511   Thu Jan 14, 2021   1501 Talked to Penaloza [place] from ortho  Recommended admission to [place] and plan for surgery tomorrow  MDM  Number of Diagnoses or Management Options  Closed fracture of right hip, initial encounter [facility]):   Fall, initial encounter:   Diagnosis management comments: Discussed results with patient  Patient verbalized understanding and is agreeable to admission and plan for surgery tomorrow  Disposition  Final diagnoses:   None     ED Disposition     None      Follow-up Information    None         Patient's Medications   Discharge Prescriptions    No medications on file     No discharge procedures on file      PDMP Review     None          ED Provider  Electronically Signed by           Zoe Rand PA-C  01/14/21 8165

## 2021-01-14 NOTE — ASSESSMENT & PLAN NOTE
This is a 63-year-old female with history of schizophrenia and seizure disorder 20 after serving a mechanical fall at home  As a right femur fracture  No known history of any CAD, arrhythmias, denies any chest pain  Chest x-ray and EKG reviewed    Patient can proceed to OR without further workup at this point

## 2021-01-15 ENCOUNTER — APPOINTMENT (INPATIENT)
Dept: RADIOLOGY | Facility: HOSPITAL | Age: 66
DRG: 521 | End: 2021-01-15
Payer: MEDICARE

## 2021-01-15 ENCOUNTER — ANESTHESIA (INPATIENT)
Dept: PERIOP | Facility: HOSPITAL | Age: 66
DRG: 521 | End: 2021-01-15
Payer: MEDICARE

## 2021-01-15 VITALS — HEART RATE: 95 BPM

## 2021-01-15 PROBLEM — E87.6 HYPOKALEMIA: Status: ACTIVE | Noted: 2021-01-15

## 2021-01-15 PROBLEM — E87.1 HYPONATREMIA: Status: ACTIVE | Noted: 2021-01-15

## 2021-01-15 PROBLEM — R50.9 FEVER: Status: ACTIVE | Noted: 2021-01-15

## 2021-01-15 LAB
ABO GROUP BLD: NORMAL
ABO GROUP BLD: NORMAL
ALBUMIN SERPL BCP-MCNC: 3.1 G/DL (ref 3.5–5)
ALP SERPL-CCNC: 73 U/L (ref 46–116)
ALT SERPL W P-5'-P-CCNC: 21 U/L (ref 12–78)
ANION GAP SERPL CALCULATED.3IONS-SCNC: 8 MMOL/L (ref 4–13)
AST SERPL W P-5'-P-CCNC: 17 U/L (ref 5–45)
BASOPHILS # BLD AUTO: 0.03 THOUSANDS/ΜL (ref 0–0.1)
BASOPHILS NFR BLD AUTO: 0 % (ref 0–1)
BILIRUB SERPL-MCNC: 0.89 MG/DL (ref 0.2–1)
BILIRUB UR QL STRIP: NEGATIVE
BLD GP AB SCN SERPL QL: NEGATIVE
BUN SERPL-MCNC: 9 MG/DL (ref 5–25)
CALCIUM ALBUM COR SERPL-MCNC: 9 MG/DL (ref 8.3–10.1)
CALCIUM SERPL-MCNC: 8.3 MG/DL (ref 8.3–10.1)
CHLORIDE SERPL-SCNC: 97 MMOL/L (ref 100–108)
CLARITY UR: CLEAR
CO2 SERPL-SCNC: 27 MMOL/L (ref 21–32)
COLOR UR: YELLOW
CREAT SERPL-MCNC: 0.62 MG/DL (ref 0.6–1.3)
EOSINOPHIL # BLD AUTO: 0 THOUSAND/ΜL (ref 0–0.61)
EOSINOPHIL NFR BLD AUTO: 0 % (ref 0–6)
ERYTHROCYTE [DISTWIDTH] IN BLOOD BY AUTOMATED COUNT: 13.2 % (ref 11.6–15.1)
FLUAV RNA RESP QL NAA+PROBE: NEGATIVE
FLUBV RNA RESP QL NAA+PROBE: NEGATIVE
GFR SERPL CREATININE-BSD FRML MDRD: 96 ML/MIN/1.73SQ M
GLUCOSE SERPL-MCNC: 96 MG/DL (ref 65–140)
GLUCOSE UR STRIP-MCNC: NEGATIVE MG/DL
HCT VFR BLD AUTO: 33.2 % (ref 34.8–46.1)
HGB BLD-MCNC: 11.4 G/DL (ref 11.5–15.4)
HGB UR QL STRIP.AUTO: NEGATIVE
IMM GRANULOCYTES # BLD AUTO: 0.03 THOUSAND/UL (ref 0–0.2)
IMM GRANULOCYTES NFR BLD AUTO: 0 % (ref 0–2)
KETONES UR STRIP-MCNC: NEGATIVE MG/DL
LACTATE SERPL-SCNC: 0.8 MMOL/L (ref 0.5–2)
LEUKOCYTE ESTERASE UR QL STRIP: NEGATIVE
LYMPHOCYTES # BLD AUTO: 0.5 THOUSANDS/ΜL (ref 0.6–4.47)
LYMPHOCYTES NFR BLD AUTO: 6 % (ref 14–44)
MCH RBC QN AUTO: 32.8 PG (ref 26.8–34.3)
MCHC RBC AUTO-ENTMCNC: 34.3 G/DL (ref 31.4–37.4)
MCV RBC AUTO: 95 FL (ref 82–98)
MONOCYTES # BLD AUTO: 0.65 THOUSAND/ΜL (ref 0.17–1.22)
MONOCYTES NFR BLD AUTO: 8 % (ref 4–12)
NEUTROPHILS # BLD AUTO: 6.95 THOUSANDS/ΜL (ref 1.85–7.62)
NEUTS SEG NFR BLD AUTO: 86 % (ref 43–75)
NITRITE UR QL STRIP: NEGATIVE
NRBC BLD AUTO-RTO: 0 /100 WBCS
PH UR STRIP.AUTO: 7.5 [PH]
PLATELET # BLD AUTO: 193 THOUSANDS/UL (ref 149–390)
PMV BLD AUTO: 8.7 FL (ref 8.9–12.7)
POTASSIUM SERPL-SCNC: 3.3 MMOL/L (ref 3.5–5.3)
PROCALCITONIN SERPL-MCNC: 0.08 NG/ML
PROT SERPL-MCNC: 6.1 G/DL (ref 6.4–8.2)
PROT UR STRIP-MCNC: NEGATIVE MG/DL
RBC # BLD AUTO: 3.48 MILLION/UL (ref 3.81–5.12)
RH BLD: POSITIVE
RH BLD: POSITIVE
RSV RNA RESP QL NAA+PROBE: NEGATIVE
SARS-COV-2 RNA RESP QL NAA+PROBE: NEGATIVE
SODIUM SERPL-SCNC: 132 MMOL/L (ref 136–145)
SP GR UR STRIP.AUTO: 1.01 (ref 1–1.03)
SPECIMEN EXPIRATION DATE: NORMAL
UROBILINOGEN UR QL STRIP.AUTO: 0.2 E.U./DL
WBC # BLD AUTO: 8.16 THOUSAND/UL (ref 4.31–10.16)

## 2021-01-15 PROCEDURE — 27236 TREAT THIGH FRACTURE: CPT | Performed by: PHYSICIAN ASSISTANT

## 2021-01-15 PROCEDURE — C1776 JOINT DEVICE (IMPLANTABLE): HCPCS | Performed by: ORTHOPAEDIC SURGERY

## 2021-01-15 PROCEDURE — 99232 SBSQ HOSP IP/OBS MODERATE 35: CPT | Performed by: INTERNAL MEDICINE

## 2021-01-15 PROCEDURE — 86900 BLOOD TYPING SEROLOGIC ABO: CPT | Performed by: PHYSICIAN ASSISTANT

## 2021-01-15 PROCEDURE — G9197 ORDER FOR CEPH: HCPCS | Performed by: ORTHOPAEDIC SURGERY

## 2021-01-15 PROCEDURE — 80053 COMPREHEN METABOLIC PANEL: CPT | Performed by: INTERNAL MEDICINE

## 2021-01-15 PROCEDURE — 27236 TREAT THIGH FRACTURE: CPT | Performed by: ORTHOPAEDIC SURGERY

## 2021-01-15 PROCEDURE — 85025 COMPLETE CBC W/AUTO DIFF WBC: CPT | Performed by: INTERNAL MEDICINE

## 2021-01-15 PROCEDURE — 86901 BLOOD TYPING SEROLOGIC RH(D): CPT | Performed by: PHYSICIAN ASSISTANT

## 2021-01-15 PROCEDURE — 73502 X-RAY EXAM HIP UNI 2-3 VIEWS: CPT

## 2021-01-15 PROCEDURE — 86850 RBC ANTIBODY SCREEN: CPT | Performed by: PHYSICIAN ASSISTANT

## 2021-01-15 PROCEDURE — 99223 1ST HOSP IP/OBS HIGH 75: CPT | Performed by: PHYSICAL MEDICINE & REHABILITATION

## 2021-01-15 PROCEDURE — 0SRR01A REPLACEMENT OF RIGHT HIP JOINT, FEMORAL SURFACE WITH METAL SYNTHETIC SUBSTITUTE, UNCEMENTED, OPEN APPROACH: ICD-10-PCS | Performed by: ORTHOPAEDIC SURGERY

## 2021-01-15 PROCEDURE — 99223 1ST HOSP IP/OBS HIGH 75: CPT | Performed by: ORTHOPAEDIC SURGERY

## 2021-01-15 DEVICE — MODULAR CATHCART TAPERED SPACER 12/14 TAPER +0MM: Type: IMPLANTABLE DEVICE | Site: HIP | Status: FUNCTIONAL

## 2021-01-15 DEVICE — MODULAR CATHCART FRACTURE HEAD HIP BALL 47MM OD +5MM: Type: IMPLANTABLE DEVICE | Site: HIP | Status: FUNCTIONAL

## 2021-01-15 DEVICE — TRI-LOCK BPS FEMORAL STEM 12/14 TAPER TRI-LOCK BPS W/GRIPTION SIZE 4 HI 103MM
Type: IMPLANTABLE DEVICE | Site: HIP | Status: FUNCTIONAL
Brand: TRI-LOCK GRIPTION

## 2021-01-15 RX ORDER — SENNOSIDES 8.6 MG
1 TABLET ORAL DAILY
Status: DISCONTINUED | OUTPATIENT
Start: 2021-01-16 | End: 2021-01-18 | Stop reason: HOSPADM

## 2021-01-15 RX ORDER — FENTANYL CITRATE/PF 50 MCG/ML
25 SYRINGE (ML) INJECTION
Status: DISCONTINUED | OUTPATIENT
Start: 2021-01-15 | End: 2021-01-15 | Stop reason: HOSPADM

## 2021-01-15 RX ORDER — EPHEDRINE SULFATE 50 MG/ML
INJECTION INTRAVENOUS AS NEEDED
Status: DISCONTINUED | OUTPATIENT
Start: 2021-01-15 | End: 2021-01-15

## 2021-01-15 RX ORDER — HYDROMORPHONE HCL/PF 1 MG/ML
0.5 SYRINGE (ML) INJECTION
Status: DISCONTINUED | OUTPATIENT
Start: 2021-01-15 | End: 2021-01-15 | Stop reason: HOSPADM

## 2021-01-15 RX ORDER — SODIUM CHLORIDE 9 MG/ML
75 INJECTION, SOLUTION INTRAVENOUS CONTINUOUS
Status: DISCONTINUED | OUTPATIENT
Start: 2021-01-15 | End: 2021-01-15

## 2021-01-15 RX ORDER — NEOSTIGMINE METHYLSULFATE 1 MG/ML
INJECTION INTRAVENOUS AS NEEDED
Status: DISCONTINUED | OUTPATIENT
Start: 2021-01-15 | End: 2021-01-15

## 2021-01-15 RX ORDER — ACETAMINOPHEN 325 MG/1
650 TABLET ORAL EVERY 6 HOURS PRN
Status: DISCONTINUED | OUTPATIENT
Start: 2021-01-15 | End: 2021-01-18 | Stop reason: HOSPADM

## 2021-01-15 RX ORDER — ASCORBIC ACID 500 MG
500 TABLET ORAL 2 TIMES DAILY
Status: DISCONTINUED | OUTPATIENT
Start: 2021-01-15 | End: 2021-01-18 | Stop reason: HOSPADM

## 2021-01-15 RX ORDER — MIDAZOLAM HYDROCHLORIDE 2 MG/2ML
INJECTION, SOLUTION INTRAMUSCULAR; INTRAVENOUS AS NEEDED
Status: DISCONTINUED | OUTPATIENT
Start: 2021-01-15 | End: 2021-01-15

## 2021-01-15 RX ORDER — POTASSIUM CHLORIDE 20 MEQ/1
40 TABLET, EXTENDED RELEASE ORAL ONCE
Status: COMPLETED | OUTPATIENT
Start: 2021-01-15 | End: 2021-01-15

## 2021-01-15 RX ORDER — FERROUS SULFATE 325(65) MG
325 TABLET ORAL 2 TIMES DAILY WITH MEALS
Status: DISCONTINUED | OUTPATIENT
Start: 2021-01-15 | End: 2021-01-18 | Stop reason: HOSPADM

## 2021-01-15 RX ORDER — ONDANSETRON 2 MG/ML
4 INJECTION INTRAMUSCULAR; INTRAVENOUS ONCE AS NEEDED
Status: DISCONTINUED | OUTPATIENT
Start: 2021-01-15 | End: 2021-01-15 | Stop reason: HOSPADM

## 2021-01-15 RX ORDER — MAGNESIUM HYDROXIDE 1200 MG/15ML
LIQUID ORAL AS NEEDED
Status: DISCONTINUED | OUTPATIENT
Start: 2021-01-15 | End: 2021-01-15 | Stop reason: HOSPADM

## 2021-01-15 RX ORDER — ROCURONIUM BROMIDE 10 MG/ML
INJECTION, SOLUTION INTRAVENOUS AS NEEDED
Status: DISCONTINUED | OUTPATIENT
Start: 2021-01-15 | End: 2021-01-15

## 2021-01-15 RX ORDER — ONDANSETRON 2 MG/ML
4 INJECTION INTRAMUSCULAR; INTRAVENOUS EVERY 6 HOURS PRN
Status: DISCONTINUED | OUTPATIENT
Start: 2021-01-15 | End: 2021-01-18 | Stop reason: HOSPADM

## 2021-01-15 RX ORDER — FENTANYL CITRATE 50 UG/ML
INJECTION, SOLUTION INTRAMUSCULAR; INTRAVENOUS AS NEEDED
Status: DISCONTINUED | OUTPATIENT
Start: 2021-01-15 | End: 2021-01-15

## 2021-01-15 RX ORDER — ACETAMINOPHEN 325 MG/1
975 TABLET ORAL EVERY 8 HOURS SCHEDULED
Status: DISCONTINUED | OUTPATIENT
Start: 2021-01-15 | End: 2021-01-18 | Stop reason: HOSPADM

## 2021-01-15 RX ORDER — PROPOFOL 10 MG/ML
INJECTION, EMULSION INTRAVENOUS AS NEEDED
Status: DISCONTINUED | OUTPATIENT
Start: 2021-01-15 | End: 2021-01-15

## 2021-01-15 RX ORDER — DEXAMETHASONE SODIUM PHOSPHATE 4 MG/ML
INJECTION, SOLUTION INTRA-ARTICULAR; INTRALESIONAL; INTRAMUSCULAR; INTRAVENOUS; SOFT TISSUE AS NEEDED
Status: DISCONTINUED | OUTPATIENT
Start: 2021-01-15 | End: 2021-01-15

## 2021-01-15 RX ORDER — GLYCOPYRROLATE 0.2 MG/ML
INJECTION INTRAMUSCULAR; INTRAVENOUS AS NEEDED
Status: DISCONTINUED | OUTPATIENT
Start: 2021-01-15 | End: 2021-01-15

## 2021-01-15 RX ORDER — LIDOCAINE HYDROCHLORIDE 20 MG/ML
INJECTION, SOLUTION EPIDURAL; INFILTRATION; INTRACAUDAL; PERINEURAL AS NEEDED
Status: DISCONTINUED | OUTPATIENT
Start: 2021-01-15 | End: 2021-01-15

## 2021-01-15 RX ORDER — FOLIC ACID 1 MG/1
1 TABLET ORAL DAILY
Status: DISCONTINUED | OUTPATIENT
Start: 2021-01-16 | End: 2021-01-18 | Stop reason: HOSPADM

## 2021-01-15 RX ORDER — CEFAZOLIN SODIUM 1 G/50ML
1000 SOLUTION INTRAVENOUS EVERY 8 HOURS
Status: COMPLETED | OUTPATIENT
Start: 2021-01-15 | End: 2021-01-16

## 2021-01-15 RX ORDER — CALCIUM CARBONATE 200(500)MG
1000 TABLET,CHEWABLE ORAL DAILY PRN
Status: DISCONTINUED | OUTPATIENT
Start: 2021-01-15 | End: 2021-01-18 | Stop reason: HOSPADM

## 2021-01-15 RX ORDER — ONDANSETRON 2 MG/ML
INJECTION INTRAMUSCULAR; INTRAVENOUS AS NEEDED
Status: DISCONTINUED | OUTPATIENT
Start: 2021-01-15 | End: 2021-01-15

## 2021-01-15 RX ORDER — PANTOPRAZOLE SODIUM 40 MG/1
40 TABLET, DELAYED RELEASE ORAL
Status: DISCONTINUED | OUTPATIENT
Start: 2021-01-16 | End: 2021-01-18 | Stop reason: HOSPADM

## 2021-01-15 RX ORDER — TRANEXAMIC ACID 100 MG/ML
INJECTION, SOLUTION INTRAVENOUS AS NEEDED
Status: DISCONTINUED | OUTPATIENT
Start: 2021-01-15 | End: 2021-01-15

## 2021-01-15 RX ORDER — SODIUM CHLORIDE, SODIUM LACTATE, POTASSIUM CHLORIDE, CALCIUM CHLORIDE 600; 310; 30; 20 MG/100ML; MG/100ML; MG/100ML; MG/100ML
75 INJECTION, SOLUTION INTRAVENOUS CONTINUOUS
Status: DISCONTINUED | OUTPATIENT
Start: 2021-01-15 | End: 2021-01-16

## 2021-01-15 RX ORDER — DOCUSATE SODIUM 100 MG/1
100 CAPSULE, LIQUID FILLED ORAL 2 TIMES DAILY
Status: DISCONTINUED | OUTPATIENT
Start: 2021-01-15 | End: 2021-01-18 | Stop reason: HOSPADM

## 2021-01-15 RX ORDER — HYDROMORPHONE HCL/PF 1 MG/ML
SYRINGE (ML) INJECTION AS NEEDED
Status: DISCONTINUED | OUTPATIENT
Start: 2021-01-15 | End: 2021-01-15

## 2021-01-15 RX ADMIN — ROCURONIUM BROMIDE 40 MG: 10 INJECTION, SOLUTION INTRAVENOUS at 14:17

## 2021-01-15 RX ADMIN — TRANEXAMIC ACID 1000 MG: 1 INJECTION, SOLUTION INTRAVENOUS at 14:36

## 2021-01-15 RX ADMIN — CEFAZOLIN SODIUM 2000 MG: 2 SOLUTION INTRAVENOUS at 14:07

## 2021-01-15 RX ADMIN — CEFAZOLIN SODIUM 1000 MG: 1 SOLUTION INTRAVENOUS at 22:09

## 2021-01-15 RX ADMIN — FENTANYL CITRATE 50 MCG: 50 INJECTION, SOLUTION INTRAMUSCULAR; INTRAVENOUS at 14:46

## 2021-01-15 RX ADMIN — ACETAMINOPHEN 650 MG: 325 TABLET ORAL at 01:47

## 2021-01-15 RX ADMIN — PHENYLEPHRINE HYDROCHLORIDE 200 MCG: 10 INJECTION INTRAVENOUS at 15:16

## 2021-01-15 RX ADMIN — PHENYLEPHRINE HYDROCHLORIDE 200 MCG: 10 INJECTION INTRAVENOUS at 14:54

## 2021-01-15 RX ADMIN — OLANZAPINE 40 MG: 10 TABLET, FILM COATED ORAL at 23:26

## 2021-01-15 RX ADMIN — FERROUS SULFATE TAB 325 MG (65 MG ELEMENTAL FE) 325 MG: 325 (65 FE) TAB at 17:29

## 2021-01-15 RX ADMIN — FENTANYL CITRATE 100 MCG: 50 INJECTION, SOLUTION INTRAMUSCULAR; INTRAVENOUS at 14:17

## 2021-01-15 RX ADMIN — MORPHINE SULFATE 1 MG: 2 INJECTION, SOLUTION INTRAMUSCULAR; INTRAVENOUS at 06:02

## 2021-01-15 RX ADMIN — OXYCODONE HYDROCHLORIDE AND ACETAMINOPHEN 500 MG: 500 TABLET ORAL at 17:29

## 2021-01-15 RX ADMIN — HYDROMORPHONE HYDROCHLORIDE 0.5 MG: 1 INJECTION, SOLUTION INTRAMUSCULAR; INTRAVENOUS; SUBCUTANEOUS at 15:08

## 2021-01-15 RX ADMIN — PHENYLEPHRINE HYDROCHLORIDE 100 MCG: 10 INJECTION INTRAVENOUS at 14:43

## 2021-01-15 RX ADMIN — PHENYLEPHRINE HYDROCHLORIDE 100 MCG: 10 INJECTION INTRAVENOUS at 14:29

## 2021-01-15 RX ADMIN — PROPOFOL 150 MG: 10 INJECTION, EMULSION INTRAVENOUS at 14:17

## 2021-01-15 RX ADMIN — ROCURONIUM BROMIDE 10 MG: 10 INJECTION, SOLUTION INTRAVENOUS at 14:46

## 2021-01-15 RX ADMIN — ONDANSETRON 4 MG: 2 INJECTION INTRAMUSCULAR; INTRAVENOUS at 14:17

## 2021-01-15 RX ADMIN — LIDOCAINE HYDROCHLORIDE 80 MG: 20 INJECTION, SOLUTION EPIDURAL; INFILTRATION; INTRACAUDAL; PERINEURAL at 14:17

## 2021-01-15 RX ADMIN — MIDAZOLAM 2 MG: 1 INJECTION INTRAMUSCULAR; INTRAVENOUS at 14:11

## 2021-01-15 RX ADMIN — CARBAMAZEPINE 400 MG: 200 TABLET ORAL at 08:20

## 2021-01-15 RX ADMIN — ACETAMINOPHEN 975 MG: 325 TABLET ORAL at 21:35

## 2021-01-15 RX ADMIN — SODIUM CHLORIDE: 0.9 INJECTION, SOLUTION INTRAVENOUS at 15:32

## 2021-01-15 RX ADMIN — GLYCOPYRROLATE 0.4 MG: 0.2 INJECTION, SOLUTION INTRAMUSCULAR; INTRAVENOUS at 15:39

## 2021-01-15 RX ADMIN — EPHEDRINE SULFATE 10 MG: 50 INJECTION, SOLUTION INTRAVENOUS at 15:22

## 2021-01-15 RX ADMIN — POTASSIUM CHLORIDE 40 MEQ: 1500 TABLET, EXTENDED RELEASE ORAL at 09:47

## 2021-01-15 RX ADMIN — EPHEDRINE SULFATE 10 MG: 50 INJECTION, SOLUTION INTRAVENOUS at 15:37

## 2021-01-15 RX ADMIN — OXYCODONE HYDROCHLORIDE 2.5 MG: 5 TABLET ORAL at 01:54

## 2021-01-15 RX ADMIN — SODIUM CHLORIDE, SODIUM LACTATE, POTASSIUM CHLORIDE, AND CALCIUM CHLORIDE 75 ML/HR: .6; .31; .03; .02 INJECTION, SOLUTION INTRAVENOUS at 17:27

## 2021-01-15 RX ADMIN — OXYCODONE HYDROCHLORIDE 2.5 MG: 5 TABLET ORAL at 08:20

## 2021-01-15 RX ADMIN — DOCUSATE SODIUM 100 MG: 100 CAPSULE, LIQUID FILLED ORAL at 17:29

## 2021-01-15 RX ADMIN — SODIUM CHLORIDE 75 ML/HR: 0.9 INJECTION, SOLUTION INTRAVENOUS at 09:47

## 2021-01-15 RX ADMIN — CARBAMAZEPINE 400 MG: 200 TABLET ORAL at 23:25

## 2021-01-15 RX ADMIN — NEOSTIGMINE METHYLSULFATE 3 MG: 1 INJECTION INTRAVENOUS at 15:39

## 2021-01-15 RX ADMIN — PHENYLEPHRINE HYDROCHLORIDE 200 MCG: 10 INJECTION INTRAVENOUS at 15:22

## 2021-01-15 RX ADMIN — PHENYLEPHRINE HYDROCHLORIDE 100 MCG: 10 INJECTION INTRAVENOUS at 14:33

## 2021-01-15 RX ADMIN — DEXAMETHASONE SODIUM PHOSPHATE 4 MG: 4 INJECTION INTRA-ARTICULAR; INTRALESIONAL; INTRAMUSCULAR; INTRAVENOUS; SOFT TISSUE at 14:17

## 2021-01-15 RX ADMIN — FENTANYL CITRATE 50 MCG: 50 INJECTION, SOLUTION INTRAMUSCULAR; INTRAVENOUS at 15:08

## 2021-01-15 NOTE — ANESTHESIA PREPROCEDURE EVALUATION
Procedure:  ARTHROPLASTY HIP TOTAL (Right Hip)    Relevant Problems   CARDIO   (+) Hypertension      HEMATOLOGY (within normal limits)      MUSCULOSKELETAL   (+) Acute arthritis      NEURO/PSYCH   (+) Schizophrenia (HCC)   (+) Seizure disorder (HCC)      PULMONARY (within normal limits)      Other   (+) Femur fracture, right (HCC)        Physical Exam    Airway    Mallampati score: I  TM Distance: >3 FB  Neck ROM: full     Dental   Comment: Has one tooth,     Cardiovascular  Cardiovascular exam normal    Pulmonary  Pulmonary exam normal     Other Findings        Anesthesia Plan  ASA Score- 2     Anesthesia Type- general with ASA Monitors  Additional Monitors:   Airway Plan: ETT  Plan Factors-    Induction- intravenous  Postoperative Plan-     Informed Consent- Anesthetic plan and risks discussed with patient

## 2021-01-15 NOTE — ASSESSMENT & PLAN NOTE
Mild Na 132    Gentle IV hydration with NS while NPO in anticipation for OR  F/u AM BMP  If continues to drop, may be SIADH-pain induced, consider fluid restriction

## 2021-01-15 NOTE — OCCUPATIONAL THERAPY NOTE
Occupational Therapy Cancellation         Patient Name: Gladys Carrasquillo  JGVKR'Q Date: 1/15/2021    OT consult received, pt w/ R femur fracture and to go to OR today  Will follow post-op      Alexander Lugo MS, OTR/L

## 2021-01-15 NOTE — PHYSICAL THERAPY NOTE
PHYSICAL THERAPY NOTE          Patient Name: Juan Buchanan  CNEFK'O Date: 1/15/2021     PT consult received  Chart reviewed  Pt with R femur fracture and to go to OR today at 1300 for hip hemiarthroplasty  PT will follow post operatively and complete evaluation as appropriate    Emma Nj PT

## 2021-01-15 NOTE — QUICK NOTE
Notified by nursing of patient with fever of 101 5F  Other vitals stable  Patient now meeting sepsis criteria with fever and leukocytosis  Stat infectious workup with blood cultures, lactic acid, and procalcitonin  CXR from earlier today without cardiopulmonary abnormality  Stat covid swab  UA pending  Per nursing patient denies any complaints, denies SOB, pain with urination, abdominal pain  Follow up on labs  Tylenol prn fever

## 2021-01-15 NOTE — PROGRESS NOTES
Progress Note Caroline Jorgensen 1955, 63 y o  female MRN: [id number]    Unit/Bed#: OR POOL Encounter: [id number]    Primary Care Provider: Nae Sultana DO   Date and time admitted to hospital: 1/14/2021  1:19 PM        * Femur fracture, right [facility])  Assessment & Plan  This is a 29-year-old female with history of schizophrenia and seizure disorder presented after suffering a mechanical fall at home when she tripped over her dog  · X-ray revealed right femoral neck fracture  · Orthopedic consul appreciated  · Plan for OR today with ortho, medically cleared  · Continue with pain control  · PT/OT/PM&R eval after OR  · DVT prophy per ortho  · Weight bearing status per ortho    Hyponatremia  Assessment & Plan  Mild Na 132  Gentle IV hydration with NS while NPO in anticipation for OR  F/u AM BMP  If continues to drop, may be SIADH-pain induced, consider fluid restriction    Hypokalemia  Assessment & Plan  Repleted    Fever  Assessment & Plan  · Did have febrile episode with Tmax 101 5  Infectious w/u unrevealining with COVID 19 neg, procal neg, UA neg  LA wnl  Blood cx pending  WBC ct-->15 68-->8 16  · Likely non-infectious in the setting of femur fracture  Will continue to monitor off abx for now  Monitor for signs of infeciton  Monitor fever curve and WBC ct  · F/u blood Cx    Seizure disorder (HCC)  Assessment & Plan  · Continue Tegretol  No witnessed seizure activity since hospitalized  Schizophrenia ([facility] [address] )  Assessment & Plan  · Continue Zyprexa, at baseline          Subjective/Objective     Subjective:   Patient had a febrile episode overnight with tmax 101 5  Denies any complaints otherwise  Pain controlled  Objective:  Vitals: Blood pressure (!) 177/90, pulse 83, temperature 100 4 °F (38 °C), temperature source Temporal, resp  rate 20, height 5' 5" (1 651 m), weight 66 kg (145 lb 8 1 oz), SpO2 96 %  ,Body mass index is 24 21 kg/m²        Intake/Output Summary (Last 24 hours) at 1/15/2021 1543  Last data filed at 1/15/2021 1533  Gross per 24 hour   Intake 1000 ml   Output 1600 ml   Net -600 ml       Invasive Devices     Peripheral Intravenous Line            Peripheral IV 01/14/21 Right Antecubital 1 day          Drain            Urethral Catheter Non-latex 16 Fr  less than 1 day          Airway            ETT  Oral;Cuffed 7 mm less than 1 day              Physical Exam  Vitals signs and nursing note reviewed  Constitutional:       General: She is not in acute distress  Appearance: She is not ill-appearing, toxic-appearing or diaphoretic  HENT:      Head: Normocephalic and atraumatic  Cardiovascular:      Rate and Rhythm: Normal rate and regular rhythm  Pulmonary:      Effort: No respiratory distress  Breath sounds: No wheezing, rhonchi or rales  Abdominal:      General: There is no distension  Palpations: Abdomen is soft  Tenderness: There is no abdominal tenderness  There is no guarding  Musculoskeletal:         General: Swelling and tenderness (RLE with palpation ) present  Skin:     General: Skin is warm and dry  Capillary Refill: Capillary refill takes less than 2 seconds  Neurological:      General: No focal deficit present  Mental Status: She is alert  Mental status is at baseline  Lab, Imaging and other studies: I have personally reviewed pertinent reports      VTE Pharmacologic Prophylaxis: Reason for no pharmacologic prophylaxis Pre-op  VTE Mechanical Prophylaxis: sequential compression device

## 2021-01-15 NOTE — OP NOTE
OPERATIVE REPORT    PATIENT NAME: Neda Delaney   :  1955  MRN: [id number]  Pt Location: [facility] OR ROOM 02    SURGERY DATE: 1/15/2021    SURGEON(S) and ROLE:  Primary: Eddie Sanchez MD  Assisting: Bia Rodrigues PA-C    NOTE:  The presence of a physician assistant was necessary to help with patient positioning, surgical exposure, wound retraction, wound closure, and other key portions of the procedure  No qualified resident was available for this case  PREOPERATIVE DIAGNOSES:  Right Transcervical Femoral Neck Fracture    POSTOPERATIVE DIAGNOSES:  Same as Preoperative Diagnosis    PROCEDURES:  Right Hip Hemiarthroplasty      ANESTHESIA TYPE:  General endotracheal    ANESTHESIA STAFF:   Anesthesiologist: Ramona Roberson DO  CRNA: Rometta Harada Ribecky, CRNA    ESTIMATED BLOOD LOSS:  200 mL    PERIOPERATIVE ANTIBIOTICS:  cefazolin, 1 gram    IMPLANTS: Femoral Stem: Size 4 DEPUY TRI-LOCK High-Offset    Femoral Neck:  +0 mm    Femoral Head: 47 mm unipolar head      Implant Name Type Inv  Item Serial No   Lot No  LRB No  Used Action   HEAD FEM UNIPOLAR 47MM MDLR HECTOR BALL - [id number]  HEAD FEM UNIPOLAR 47MM MDLR HECTOR BALL  DEPUY [id number]  1 Implanted   STEM FEMORAL TRI-LCK SZ 4 HI OFFSET - [id number]  STEM FEMORAL TRI-LCK SZ 4 HI OFFSET  DEPUY [id number]  1 Implanted   SLEEVE FEM UNIPOLAR 12/14 TPR +0 MDLR HECTOR - [id number]  SLEEVE FEM UNIPOLAR 12/14 TPR +0 MDLR HECTOR  DEPUY [id number]  1 Implanted       SPECIMENS:  * No specimens in log *    DRAINS:  None      OPERATIVE INDICATIONS:  The patient is a 63 y o  female with right hip pain and a displaced femoral neck fracture  Surgical treatment was indicated due to instability, displacement and liklihood of prolonged or permanent functional impairment with non-surgical treatment  After a thorough discussion of the potential risks, benefits, and alternative treatments, the patient agreed to proceed with surgery    The patient understands that the risks of surgery include, but are not limited to: infection, neurovascular injury, wound healing complications, venous thromboembolism, persistent pain, stiffness, instability, recurrence of symptoms, potential need for additional surgeries, and loss of limb or life  Oral and written consent for surgery was obtained from the patient preoperatively  PROCEDURE AND TECHNIQUE:  On the day of surgery, the patient was identified in the preoperative holding area  The operative site was marked by the surgeon  The patient was taken into the operating room  A time-out was conducted to confirm the patient's identity, the operative site, and the proposed procedure  The patient was anesthetized, and perioperative antibiotics were administered  The patient was positioned lateral on the OR table  All bony prominences were padded  The operative site was prepped and draped using standard sterile technique  A posterior approach to the hip was performed  Incision was carried down to the fascia and hemostasis was obtained with electrocautery  The fascia was incised in line with the incision  The gluteus ray was divided bluntly and crossing vessels were cauterized  The trochanteric bursa was excised  The femur was gently internally rotated  The short external rotators were identified, released from the femur, and tagged with nonabsorbable suture  A posteriorly-based capsular flap was raised and tagged with nonabsorbable suture  Remaining posterior, superior, and inferior capsule were excised  A femoral neck osteotomy was made approximately 1 cm above the lesser trochanter with an oscillating saw and finished with an osteotome  The femoral head was extracted with a corkscrew and sized on the back table  The acetabulum was irrigated with sterile saline  The leg was flexed and internally rotated  Femoral retractors were placed    A canal finder and box osteotome were used to access the femoral canal   Femoral broaches were passed incrementally in about 10 degrees of anteversion  The final broach had a good axial and rotational stability, and it was left in place  A trial head and neck were placed  Trial reductions were performed  Neck length was adjusted as needed to provde appropriate stability and soft tissue tension  With the final trial components in place, the hip was stable to 70 degrees of IR at 30 degrees of hip flexion, 70 degrees of IR at 70 degrees of hip flexion, and 90 degrees of ER in full hip extension  There was no bony impingement, and soft tissue tension was appropriate  Leg lengths were equal based on palpation of the knees and heels  The hip was dislocated and the trial components were removed  The acetabulum and femoral canal were irrigated with sterile saline  The final components were placed and found to have excellent fit and stability  The hip was reduced and found to have excellent stability, range of motion, and soft tissue tension, with measurements identical to the trial reduction  The hip was irrigated again with sterile saline  A Hemovac drain was not placed  The posterior capsular flap and short external rotators were repaired through drill holes in the greater trochanter  The fascia was repaired with #1 Vicryl to achieve a watertight closure  The skin was closed with 2-0 vicryl and stratafix  A sterile dressing was applied  The drapes were removed, and a hip abduction pillow was placed  The patient was awakened from anesthesia and taken to the PACU in stable condition        COMPLICATIONS:  None    PATIENT DISPOSITION:  PACU       SIGNATURE:  Martine Trinh MD  DATE:  January 15, 2021  TIME:  3:31 PM

## 2021-01-15 NOTE — ASSESSMENT & PLAN NOTE
This is a 82-year-old female with history of schizophrenia and seizure disorder presented after suffering a mechanical fall at home when she tripped over her dog      · X-ray revealed right femoral neck fracture  · Orthopedic consul appreciated  · Plan for OR today with ortho, medically cleared  · Continue with pain control  · PT/OT/PM&R eval after OR  · DVT prophy per ortho  · Weight bearing status per ortho

## 2021-01-15 NOTE — CASE MANAGEMENT
CM met with pt at bedside to discuss discharge needs  Pt was a little confused at the time  CM called pt's sister to re ask assessment questions  Sister, Francia Nielsen reports that her sister is confused most of the time  Her ADL's are completed with some assistance from her sister  Sister sets up the shower, prepares meals, does the wash, etc   Pt has not been using any DME up to this point  She is not current with any VNA services  Sister requesting that a referral is sent to [facility]  CM will also email her a list of SNF's  Pt going to OR today  CM will follow up with pt's sister on other rehab choices  A post acute care recommendation was made by your care team for [facility]  Discussed Freedom of Choice with POA  List of facilities given to POA via emailed  POA aware the list is custom filtered for them by preference  and that [facility] post acute providers are designated  CM reviewed d/c planning process including the following: identifying help at home, patient preference for d/c planning needs, Discharge Lounge, [facility] Meds to Bed program, availability of treatment team to discuss questions or concerns patient and/or family may have regarding understanding medications and recognizing signs and symptoms once discharged  CM also encouraged patient to follow up with all recommended appointments after discharge  Patient advised of importance for patient and family to participate in managing patients medical well being

## 2021-01-15 NOTE — CONSULTS
Orthopedics   Elenora Bone 63 y o  female MRN: [id number]  Unit/Bed#: E2 -54      Chief Complaint:   right hip pain    HPI:   63 y  o female community ambulator status post fall at home when she tripped over her dog complaining of right hip pain and inability to bear weight  Pain is well localized to the hip and is made worse with motion or contact to the area  She is denying any distal numbness or tingling at this time  She does have a history of schizophrenia and seizures and overall does have a decreased mental capacity for her age  The patient's sister who she lives with is her primary caregiver/power of  and helps makes the medical decisions  We did have a discussion with her sister also who states that she normally does ambulate without issue  She states that she did notice recent gait change prior to the injury where she was walking differently with her left leg and the left leg appears to be shorter  Other than that she does not states she has any normal activity limitations  She is concerned that her decreased mental capacity may be an issue going forward with her understanding any commands or instruction  The patient is going to need rehab placement following surgery      Review Of Systems:   · Skin: Normal  · Neuro: See HPI  · Musculoskeletal: See HPI  · 14 point review of systems negative except as stated above     Past Medical History:   Past Medical History:   Diagnosis Date    Schizophrenia [facility])        Past Surgical History:   Past Surgical History:   Procedure Laterality Date    BRAIN SURGERY         Family History:  Family history reviewed and non-contributory  Family History   Problem Relation Age of Onset    Other Mother         Cardiac Arrest     Other Father         cardiac Arrest        Social History:  Social History     Socioeconomic History    Marital status: Single     Spouse name: None    Number of children: None    Years of education: None    Highest education level: None   Occupational History    None   Social Needs    Financial resource strain: None    Food insecurity     Worry: None     Inability: None    Transportation needs     Medical: None     Non-medical: None   Tobacco Use    Smoking status: Never Smoker    Smokeless tobacco: Never Used    Tobacco comment: onset date: 8/2/2017    Substance and Sexual Activity    Alcohol use: Not Currently    Drug use: Never    Sexual activity: None   Lifestyle    Physical activity     Days per week: None     Minutes per session: None    Stress: None   Relationships    Social connections     Talks on phone: None     Gets together: None     Attends Voodoo service: None     Active member of club or organization: None     Attends meetings of clubs or organizations: None     Relationship status: None    Intimate partner violence     Fear of current or ex partner: None     Emotionally abused: None     Physically abused: None     Forced sexual activity: None   Other Topics Concern    None   Social History Narrative    None       Allergies:    Allergies   Allergen Reactions    Penicillins            Labs:  0   Lab Value Date/Time    HCT 33 2 (L) 01/15/2021 0230    HCT 36 7 01/14/2021 1501    HGB 11 4 (L) 01/15/2021 0230    HGB 12 5 01/14/2021 1501    WBC 8 16 01/15/2021 0230    WBC 15 68 (H) 01/14/2021 1501       Meds:    Current Facility-Administered Medications:     acetaminophen (TYLENOL) tablet 975 mg, 975 mg, Oral, Q8H [facility], Aris Galeazzi, MD    carBAMazepine (TEGretol) tablet 400 mg, 400 mg, Oral, BID, Sherly Singletary MD, 400 mg at 01/15/21 0820    ceFAZolin (ANCEF) IVPB (premix in dextrose) 2,000 mg 50 mL, 2,000 mg, Intravenous, On Call To OR, Sherly Singletary MD    morphine injection 1 mg, 1 mg, Intravenous, Q4H PRN, Sherly Singletary MD, 1 mg at 01/15/21 0602    OLANZapine (ZyPREXA) tablet 40 mg, 40 mg, Oral, HS, Sherly Singletary MD, 40 mg at 01/14/21 2155    oxyCODONE (ROXICODONE) IR tablet 2 5 mg, 2 5 mg, Oral, Q4H PRN, Vani Zimmerman MD, 2 5 mg at 01/15/21 0820    potassium chloride (K-DUR,KLOR-CON) CR tablet 40 mEq, 40 mEq, Oral, Once, Elizabeth Mcdaniel MD    sodium chloride 0 9 % infusion, 75 mL/hr, Intravenous, Continuous, Elizabeth Mcdaniel MD    tranexamic Acid 1,000 mg in sodium chloride 0 9 % 100 mL IVPB, 1,000 mg, Intravenous, Once, Vani Zimmerman MD    Blood Culture:   Lab Results   Component Value Date    BLOODCX Received in Microbiology Lab  Culture in Progress  01/14/2021       Wound Culture:   No results found for: WOUNDCULT    Ins and Outs:  No intake/output data recorded  Physical Exam:   BP (!) 177/90 (BP Location: Left arm)   Pulse 83   Temp 100 4 °F (38 °C) (Temporal)   Resp 20   Wt 66 kg (145 lb 8 1 oz)   SpO2 96%   BMI 24 21 kg/m²   Gen: Alert and oriented to person, place, time  HEENT: EOMI, eyes clear, moist mucus membranes, hearing intact  Respiratory: Bilateral chest rise  No audible wheezing found  Cardiovascular: Regular Rate and Rhythm  Abdomen: soft nontender/nondistended  Musculoskeletal: right lower extremity  · Skin intact  · Tender to palpation over hip  · Pain with int/external rotation  · Sensation intact L3-S1  · Positive ankle dorsi/plantar flexion, EHL/FHL  · 2+ DP pulse      Radiology:   I personally reviewed the films  X-rays right hip shows femoral neck fracture    _*_*_*_*_*_*_*_*_*_*_*_*_*_*_*_*_*_*_*_*_*_*_*_*_*_*_*_*_*_*_*_*_*_*_*_*_*_*_*_*_*    Assessment:  63 y  o female status post fall with right femoral neck fracture    Plan:   · Non weight bearing right lower extremity  · Analgesics for pain  · Informed consent was obtained over the phone with her sister (power of )  · Pre op labs in ED  · NPO  · Perera Catheter insertion  · Medicine for all medical management and preoperative risk stratification  · To OR for Posterior total hip arthroplasty versus hemiarthroplasty the right hip    Gaby Kerr PA-C

## 2021-01-15 NOTE — CONSULTS
PHYSICAL MEDICINE AND REHABILITATION CONSULT NOTE  Tierra De Luna 63 y o  female MRN: [id number]  Unit/Bed#: E2 -01 Encounter: [id number]    Requested by (Physician/Service): Luanne Morales MD  Reason for Consultation:  Assessment of rehabilitation needs  Chief Complaint:  R leg pain    Assessment:  Rehabilitation Diagnosis:    Unilateral femur fracture - R femoral neck   Schizophrenia   Hyponatremia   Hypokalemia   Pain   Impaired mobility and self care   Impaired cognition - baseline  Recommendations:  Rehabilitation Plan:   Continue PT/OT (SLP) while on acute care   I suspect that her level of medical complexity can be managed at a subacute level of rehab  Discussed with case management   She has excellent support available from her sister  Medical Co-morbidities Plan:  #Pain:  As per primary team  Using oxy PRN sparingly  #Bowel: Last BM 1/14  Monitor while on oxy - consider starting prophylactic docusate  #Bladder: Voiding and continent  #Skin/Pressure Injury Prevention: Turn Q2hr in bed, with weight shifts [id number] in wheelchair  #DVT Prophylaxis: As per orthopedics team   #GI Prophylaxis: PO diet  Thank you for allowing the PM&R service to participate in the care of this patient  We will continue to follow [address] [place] [address] progress with you  Please do not hesitate to call with questions or concerns    History of Present Illness:  Tierra De Luna is a 63 y o  female with PMH of schizophrenia, seizure disorder, who presented to [facility] on 1/14 after a mechanical fall (tripped on her dog)  Found to have a R femoral neck fracture in ER  On day of admission with fever of 101 5 and leukocytosis meeting sepsis criteria  CXR without cardiopulmonary abnormalities  UA negative, and COVID/Flu/RSV negative on 1/14  Today with very mild hyponatremia, hypokalemia, and her leukocytosis has trended down to normal  Primary complaint is RLE pain, but denies numbness/weakness  Denies any CP, SOB, fevers, chills, N/V, abdominal pain, headaches, lightheadedness/dizziness  Denies history of constipation  Lives with sister who helps with IADLs, but she otherwise takes care of herself  Review of Systems: 10 point ROS negative except for what is noted in HPI    CURRENT GAP IN FUNCTION     Prior to Admission:    Lives with her sister who is her primary caregiver and POA  Independent with mobility/ADLs  FUNCTIONAL STATUS:  Physical Therapy:  pending    Occupational Therapy:  pending    Social History:    Georgina Jimenez is not  and Lives with: lives with their family  She lives in [facility] [place] single family home  The living area: can live on one level  There 2 steps to enter the home  Patient/family's goals: Return to previous home/apartment  The patient will have 24 hour ARC Supervision/physical assistance: supervision/physical assistance available upon discharge    Social History     Socioeconomic History    Marital status: Single     Spouse name: None    Number of children: None    Years of education: None    Highest education level: None   Occupational History    None   Social Needs    Financial resource strain: None    Food insecurity     Worry: None     Inability: None    Transportation needs     Medical: None     Non-medical: None   Tobacco Use    Smoking status: Never Smoker    Smokeless tobacco: Never Used    Tobacco comment: onset date: 8/2/2017    Substance and Sexual Activity    Alcohol use: Not Currently    Drug use: Never    Sexual activity: None   Lifestyle    Physical activity     Days per week: None     Minutes per session: None    Stress: None   Relationships    Social connections     Talks on phone: None     Gets together: None     Attends [employer] service: None     Active member of club or organization: None     Attends meetings of clubs or organizations: None     Relationship status: None    Intimate partner violence     Fear of current or ex partner: None     Emotionally abused: None     Physically abused: None     Forced sexual activity: None   Other Topics Concern    None   Social History Narrative    None        Family History:    Family History   Problem Relation Age of Onset    Other Mother         Cardiac Arrest     Other Father         cardiac Arrest          MEDICATIONS:     Current Facility-Administered Medications:     acetaminophen (TYLENOL) tablet 975 mg, 975 mg, Oral, Q8H [facility], Carol Gao MD    carBAMazepine (TEGretol) tablet 400 mg, 400 mg, Oral, BID, Lorenzo Shen MD, 400 mg at 01/15/21 0820    ceFAZolin (ANCEF) IVPB (premix in dextrose) 2,000 mg 50 mL, 2,000 mg, Intravenous, On Call To OR, Lorenzo Shen MD    morphine injection 1 mg, 1 mg, Intravenous, Q4H PRN, Lorenzo Shen MD, 1 mg at 01/15/21 0602    OLANZapine (ZyPREXA) tablet 40 mg, 40 mg, Oral, HS, Lorenzo Shen MD, 40 mg at 01/14/21 2155    oxyCODONE (ROXICODONE) IR tablet 2 5 mg, 2 5 mg, Oral, Q4H PRN, Lorenzo Shen MD, 2 5 mg at 01/15/21 0820    sodium chloride 0 9 % infusion, 75 mL/hr, Intravenous, Continuous, Carol Gao MD, Last Rate: 75 mL/hr at 01/15/21 0947, 75 mL/hr at 01/15/21 0947    tranexamic Acid 1,000 mg in sodium chloride 0 9 % 100 mL IVPB, 1,000 mg, Intravenous, Once, Lorenzo Shen MD    Past Medical History:     Past Medical History:   Diagnosis Date    Schizophrenia [facility])         Past Surgical History:     Past Surgical History:   Procedure Laterality Date    BRAIN SURGERY           Allergies:      Allergies   Allergen Reactions    Penicillins            Physical Exam:  BP (!) 177/90 (BP Location: Left arm)   Pulse 83   Temp 100 4 °F (38 °C) (Temporal)   Resp 20   Ht 5' 5" (1 651 m)   Wt 66 kg (145 lb 8 1 oz)   SpO2 96%   BMI 24 21 kg/m²        Intake/Output Summary (Last 24 hours) at 1/15/2021 1208  Last data filed at 1/15/2021 1145  Gross per 24 hour   Intake --   Output 1350 ml   Net -1350 ml       Body mass index is 24 21 kg/m²  Gen: No acute distress, Well-nourished, well-appearing  HEENT: Moist mucus membranes, Normocephalic/Atraumatic  Poor dentition  Cardiovascular: Regular rate, rhythm, S1/S2  Distal pulses palpable  Heme/Extr: No edema  Pulmonary: Non-labored breathing  Lungs CTAB  : No edmond  GI: Soft, non-tender, non-distended  BS+  MSK: not moving her bilateral lower extremities (proximal due to pain)  Strength distally is 5/5 and otherwise 5/5  Integumentary: Skin is warm, dry  Neuro: AAOx3, Dysarthric  Cognitive impairment at baseline  Impaired executive function  Sensation intact to light touch bilateral lower extremities  Psych: Blunted mood and affect - congruent  LABORATORY RESULTS:      Lab Results   Component Value Date    HGB 11 4 (L) 01/15/2021    HCT 33 2 (L) 01/15/2021    WBC 8 16 01/15/2021     Lab Results   Component Value Date    BUN 9 01/15/2021    K 3 3 (L) 01/15/2021    CL 97 (L) 01/15/2021    CREATININE 0 62 01/15/2021     No results found for: PROTIME, INR     DIAGNOSTIC STUDIES: Reviewed  Xr Hip/pelv 2-3 Vws Right    Result Date: 1/14/2021  Impression: Right femoral neck fracture  Findings concur with the preliminary report by the referring clinician already in PACS and/or our electronic record EPIC  Workstation performed: [id number]     Xr Femur 2 Views Right    Result Date: 1/14/2021  Impression: Right femoral neck fracture  Findings concur with the preliminary report by the referring clinician already in PACS and/or our electronic record EPIC  Workstation performed: [id number]     Xr Chest 1 View    Result Date: 1/14/2021  Impression: No focal airspace consolidation identified   Workstation performed: [id number]

## 2021-01-15 NOTE — ASSESSMENT & PLAN NOTE
· Did have febrile episode with Tmax 101 5  Infectious w/u unrevealining with COVID 19 neg, procal neg, UA neg  LA wnl  Blood cx pending  WBC ct-->15 68-->8 16  · Likely non-infectious in the setting of femur fracture  Will continue to monitor off abx for now  Monitor for signs of infeciton    Monitor fever curve and WBC ct  · F/u blood Cx

## 2021-01-16 LAB
ANION GAP SERPL CALCULATED.3IONS-SCNC: 5 MMOL/L (ref 4–13)
BASOPHILS # BLD AUTO: 0.03 THOUSANDS/ΜL (ref 0–0.1)
BASOPHILS NFR BLD AUTO: 0 % (ref 0–1)
BUN SERPL-MCNC: 16 MG/DL (ref 5–25)
CALCIUM SERPL-MCNC: 7.9 MG/DL (ref 8.3–10.1)
CHLORIDE SERPL-SCNC: 99 MMOL/L (ref 100–108)
CO2 SERPL-SCNC: 26 MMOL/L (ref 21–32)
CREAT SERPL-MCNC: 1.1 MG/DL (ref 0.6–1.3)
EOSINOPHIL # BLD AUTO: 0.02 THOUSAND/ΜL (ref 0–0.61)
EOSINOPHIL NFR BLD AUTO: 0 % (ref 0–6)
ERYTHROCYTE [DISTWIDTH] IN BLOOD BY AUTOMATED COUNT: 13.3 % (ref 11.6–15.1)
GFR SERPL CREATININE-BSD FRML MDRD: 54 ML/MIN/1.73SQ M
GLUCOSE SERPL-MCNC: 133 MG/DL (ref 65–140)
HCT VFR BLD AUTO: 31.2 % (ref 34.8–46.1)
HGB BLD-MCNC: 10.3 G/DL (ref 11.5–15.4)
IMM GRANULOCYTES # BLD AUTO: 0.04 THOUSAND/UL (ref 0–0.2)
IMM GRANULOCYTES NFR BLD AUTO: 0 % (ref 0–2)
LYMPHOCYTES # BLD AUTO: 0.63 THOUSANDS/ΜL (ref 0.6–4.47)
LYMPHOCYTES NFR BLD AUTO: 7 % (ref 14–44)
MAGNESIUM SERPL-MCNC: 1.9 MG/DL (ref 1.6–2.6)
MCH RBC QN AUTO: 31.9 PG (ref 26.8–34.3)
MCHC RBC AUTO-ENTMCNC: 33 G/DL (ref 31.4–37.4)
MCV RBC AUTO: 97 FL (ref 82–98)
MONOCYTES # BLD AUTO: 0.86 THOUSAND/ΜL (ref 0.17–1.22)
MONOCYTES NFR BLD AUTO: 10 % (ref 4–12)
NEUTROPHILS # BLD AUTO: 7.48 THOUSANDS/ΜL (ref 1.85–7.62)
NEUTS SEG NFR BLD AUTO: 83 % (ref 43–75)
NRBC BLD AUTO-RTO: 0 /100 WBCS
PLATELET # BLD AUTO: 144 THOUSANDS/UL (ref 149–390)
PMV BLD AUTO: 8.9 FL (ref 8.9–12.7)
POTASSIUM SERPL-SCNC: 4.4 MMOL/L (ref 3.5–5.3)
PROCALCITONIN SERPL-MCNC: 0.14 NG/ML
RBC # BLD AUTO: 3.23 MILLION/UL (ref 3.81–5.12)
SODIUM SERPL-SCNC: 130 MMOL/L (ref 136–145)
WBC # BLD AUTO: 9.06 THOUSAND/UL (ref 4.31–10.16)

## 2021-01-16 PROCEDURE — 99232 SBSQ HOSP IP/OBS MODERATE 35: CPT | Performed by: INTERNAL MEDICINE

## 2021-01-16 PROCEDURE — 85025 COMPLETE CBC W/AUTO DIFF WBC: CPT | Performed by: PHYSICIAN ASSISTANT

## 2021-01-16 PROCEDURE — 80048 BASIC METABOLIC PNL TOTAL CA: CPT | Performed by: PHYSICIAN ASSISTANT

## 2021-01-16 PROCEDURE — 99024 POSTOP FOLLOW-UP VISIT: CPT | Performed by: PHYSICIAN ASSISTANT

## 2021-01-16 PROCEDURE — 97163 PT EVAL HIGH COMPLEX 45 MIN: CPT

## 2021-01-16 PROCEDURE — 83735 ASSAY OF MAGNESIUM: CPT | Performed by: PHYSICIAN ASSISTANT

## 2021-01-16 PROCEDURE — 84145 PROCALCITONIN (PCT): CPT | Performed by: PHYSICIAN ASSISTANT

## 2021-01-16 RX ADMIN — ACETAMINOPHEN 975 MG: 325 TABLET ORAL at 21:37

## 2021-01-16 RX ADMIN — SENNOSIDES 8.6 MG: 8.6 TABLET ORAL at 08:42

## 2021-01-16 RX ADMIN — OXYCODONE HYDROCHLORIDE AND ACETAMINOPHEN 500 MG: 500 TABLET ORAL at 17:33

## 2021-01-16 RX ADMIN — OXYCODONE HYDROCHLORIDE AND ACETAMINOPHEN 500 MG: 500 TABLET ORAL at 08:42

## 2021-01-16 RX ADMIN — FERROUS SULFATE TAB 325 MG (65 MG ELEMENTAL FE) 325 MG: 325 (65 FE) TAB at 08:42

## 2021-01-16 RX ADMIN — CARBAMAZEPINE 400 MG: 200 TABLET ORAL at 10:06

## 2021-01-16 RX ADMIN — ACETAMINOPHEN 975 MG: 325 TABLET ORAL at 05:11

## 2021-01-16 RX ADMIN — DOCUSATE SODIUM 100 MG: 100 CAPSULE, LIQUID FILLED ORAL at 08:42

## 2021-01-16 RX ADMIN — OXYCODONE HYDROCHLORIDE 2.5 MG: 5 TABLET ORAL at 10:06

## 2021-01-16 RX ADMIN — CEFAZOLIN SODIUM 1000 MG: 1 SOLUTION INTRAVENOUS at 13:16

## 2021-01-16 RX ADMIN — ENOXAPARIN SODIUM 40 MG: 40 INJECTION SUBCUTANEOUS at 08:42

## 2021-01-16 RX ADMIN — DOCUSATE SODIUM 100 MG: 100 CAPSULE, LIQUID FILLED ORAL at 17:33

## 2021-01-16 RX ADMIN — PANTOPRAZOLE SODIUM 40 MG: 40 TABLET, DELAYED RELEASE ORAL at 07:16

## 2021-01-16 RX ADMIN — ACETAMINOPHEN 975 MG: 325 TABLET ORAL at 13:15

## 2021-01-16 RX ADMIN — IRON SUCROSE 300 MG: 20 INJECTION, SOLUTION INTRAVENOUS at 08:42

## 2021-01-16 RX ADMIN — FOLIC ACID 1 MG: 1 TABLET ORAL at 08:42

## 2021-01-16 RX ADMIN — CEFAZOLIN SODIUM 1000 MG: 1 SOLUTION INTRAVENOUS at 05:11

## 2021-01-16 RX ADMIN — OLANZAPINE 40 MG: 10 TABLET, FILM COATED ORAL at 21:37

## 2021-01-16 RX ADMIN — Medication 1 TABLET: at 08:42

## 2021-01-16 RX ADMIN — SODIUM CHLORIDE, SODIUM LACTATE, POTASSIUM CHLORIDE, AND CALCIUM CHLORIDE 500 ML: .6; .31; .03; .02 INJECTION, SOLUTION INTRAVENOUS at 03:32

## 2021-01-16 RX ADMIN — FERROUS SULFATE TAB 325 MG (65 MG ELEMENTAL FE) 325 MG: 325 (65 FE) TAB at 17:33

## 2021-01-16 RX ADMIN — CARBAMAZEPINE 400 MG: 200 TABLET ORAL at 21:37

## 2021-01-16 NOTE — PROGRESS NOTES
Orthopaedic Surgery - Progress Note  Yannick Bell (51 y o  female)   : 1955   MRN: [id number]  Date: 2021   Encounter: [id number]   Unit/Bed#: E2 -01    Assessment / Plan  Postop day 1 status post right hip hemiarthroplasty    · Continue with ice and analgesics as needed  · Weight-bearing as tolerated right lower extremity with posterior precautions  · Hemoglobin 10 3 this morning drop secondary to acute blood loss anemia  No signs of bleeding in the operative leg  Will continue iron supplementation and monitor at this time  · Lovenox 40 mg daily for 30 days as a DVT prophylaxis of choice  · Patient will most likely need rehab placement when medically cleared  · Follow-up 2 weeks postoperatively with Dr Ana Grijalva  79-year-old female postop day 1 status post right hip hemiarthroplasty  Patient states she is doing okay at this time denies pain in the operative hip  She denies any distal numbness or tingling  She is complaining about some left knee pain but is willing to move it without issue  Vitals  Temp:  [98 °F (36 7 °C)-99 7 °F (37 6 °C)] 99 7 °F (37 6 °C)  HR:  [] 91  Resp:  [16-21] 18  BP: ()/(54-84) 127/71  Body mass index is 29 2 kg/m²  I/O last 24 hours: In: 1428 8 [P O :180; I V :1248 8]  Out: 1950 [Urine:1750; Blood:200]    Right Hip Exam  Alignment / Posture:  Normal resting hip posture  Normal knee alignment  Inspection:  Mild right thigh swelling  No erythema  No ecchymosis  Dressing clean dry and intact at this time  Exam of left knee revealed no ecchymosis or swelling  Palpation:  No tenderness at The vivek-incisional area at this time  Palpation of the left knee which she was complaining about pain revealed no tenderness     ROM:  Hip range of motion not performed Normal knee ROM  Strength:  5/5 AT, GSC, PT, and peroneals  Stability:  Not tested  Tests:  No pertinent positive or negative tests    Neurovascular:  Sensation intact in DP/SP/Auguste/Sa/T nerve distributions  Sensation intact in all digital nerve distributions  Toes warm and perfused  Gait:  Not tested  Lab Results  (I have personally reviewed pertinent lab results )  Results from last 7 days   Lab Units 01/16/21  0300 01/15/21  0230 01/14/21  1501   WBC Thousand/uL 9 06 8 16 15 68*   HEMOGLOBIN g/dL 10 3* 11 4* 12 5   HEMATOCRIT % 31 2* 33 2* 36 7   PLATELETS Thousands/uL 144* 193 207         Results from last 7 days   Lab Units 01/16/21  0300 01/15/21  0230 01/14/21  1501   POTASSIUM mmol/L 4 4 3 3* 3 7   CHLORIDE mmol/L 99* 97* 101   CO2 mmol/L 26 27 28   BUN mg/dL 16 9 10   CREATININE mg/dL 1 10 0 62 0 62   EGFR ml/min/1 73sq m 54 96 96   CALCIUM mg/dL 7 9* 8 3 8 8   ALK PHOS U/L  --  73  --    ALT U/L  --  21  --    AST U/L  --  17  --          Results from last 7 days   Lab Units 01/14/21  2347 01/14/21  2335   BLOOD CULTURE  No Growth at 24 hrs  No Growth at 24 hrs         Leah Pascual PA-C

## 2021-01-16 NOTE — PLAN OF CARE
Problem: PHYSICAL THERAPY ADULT  Goal: Performs mobility at highest level of function for planned discharge setting  See evaluation for individualized goals  Description: Treatment/Interventions: Functional transfer training, LE strengthening/ROM, Elevations, Therapeutic exercise, Endurance training, Patient/family training, Equipment eval/education, Bed mobility, Compensatory technique education, Gait training, Continued evaluation, Spoke to nursing  Equipment Recommended: Pedro Villanueva       See flowsheet documentation for full assessment, interventions and recommendations  1/16/2021 1553 by Lavanda Castleman, PT  Note: Prognosis: Good  Problem List: Decreased strength, Decreased range of motion, Decreased endurance, Impaired balance, Decreased mobility, Decreased cognition, Impaired judgement, Decreased safety awareness, Pain, Orthopedic restrictions  Assessment: Renato Jones is a 63 y o  female who presents with mechanical fall at home  Hx of schizophrenia and seizure disorder with decreased mental capacity for her age   + R femoral neck fracture s/p R hip hemiarthroplasty  PT consulted  WBAT with posterior THR precautions  Activity as tolerated  Prior to admission resides with sister who is involved in her care  Provides assist for ADLS and IADLS  Pt ambulates independently per CM records and orthopedics consult  Currently presents with functional limitations related to post operative pain, decreased R hip ROM and strength, decreased L knee ROM and arthritic changes observed with crepitus  Decreased functional mobility, balance, activity tolerance and ability to progress for ambulation at time of evaluation  Max A for supine<>sit transitions    Limited sitting tolerance at EOB 2* dizziness with request to return to supine  BP in supine 127/71  In supine observed RLE with increased IR positioning  Reinforced THR precautions and pillow placed between legs to help maintain precautions    Given impairments following hip hemiarthroplasty will require ongoing skilled PT in order to address impairments and optimize functional outcomes  Rehab is needed at d/c         PT Discharge Recommendation: [address]          See flowsheet documentation for full assessment  1/16/2021 1550 by Ej Leslie PT  Outcome: Progressing  Note: Prognosis: Good  Problem List: Decreased strength, Decreased range of motion, Decreased endurance, Impaired balance, Decreased mobility, Decreased cognition, Impaired judgement, Decreased safety awareness, Pain, Orthopedic restrictions  Assessment: Gladys Carrasquillo is a 63 y o  female who presents with mechanical fall at home  Hx of schizophrenia and seizure disorder with decreased mental capacity for her age   + R femoral neck fracture s/p R hip hemiarthroplasty  PT consulted  WBAT with posterior THR precautions  Activity as tolerated  Prior to admission resides with sister who is involved in her care  Provides assist for ADLS and IADLS  Pt ambulates independently per CM records and orthopedics consult  Currently presents with functional limitations related to post operative pain, decreased R hip         PT Discharge Recommendation: [address]          See flowsheet documentation for full assessment

## 2021-01-16 NOTE — PHYSICAL THERAPY NOTE
PT EVALUATION    63 y o     [id number]    Hip pain [M25 559]  Closed fracture of right hip, initial encounter ([facility] [address] ) [S72 001A]    Past Medical History:   Diagnosis Date    Schizophrenia [facility])          Past Surgical History:   Procedure Laterality Date    BRAIN SURGERY        01/16/21 1155   PT Last Visit   PT Visit Date 01/16/21   Note Type   Note type Evaluation   Pain Assessment   Pain Location/Orientation Orientation: Right;Location: Hip   Pain Rating: FLACC (Rest) - Face 0   Pain Rating: FLACC (Rest) - Legs 0   Pain Rating: FLACC (Rest) - Activity 0   Pain Rating: FLACC (Rest) - Cry 1   Pain Rating: FLACC (Rest) - Consolability 0   Score: FLACC (Rest) 1   Pain Rating: FLACC (Activity) - Face 1   Pain Rating: FLACC (Activity) - Legs 1   Pain Rating: FLACC (Activity) - Activity 1   Pain Rating: FLACC (Activity) - Cry 1   Pain Rating: FLACC (Activity) - Consolability 1   Score: FLACC (Activity) 5   Home Living   Type of Home House   Home Layout One level  (2 ROBERT)   Bathroom Accessibility Accessible   Home Equipment   (but reports not using DME for ambulation )   Additional Comments PT limited historian  Reports resides with sister in one story home with 2 ROBERT  Sister provides assistance  Prior Function   Level of Liberal Needs assistance with ADLs and functional mobility   Lives With Other (Comment)  (sister)   Receives Help From Family   ADL Assistance Needs assistance   IADLs Needs assistance   Falls in the last 6 months 1 to 4   Comments Reports independence for ambulation and ADLS-per pt  CM notes indicate assistance required for ADLS  Restrictions/Precautions   Weight Bearing Precautions Per Order Yes   RLE Weight Bearing Per Order WBAT   Other Precautions Cognitive; Chair Alarm; Bed Alarm;Multiple lines; Fall Risk;Pain;WBS;THR   General   Additional Pertinent History Pt is 64 y/o female admitted p fall wtih R femur fx  Is S/P R hip hemiarthroplasty  PT consulted  WBAT    Posterior THR precautions  Family/Caregiver Present No   Cognition   Overall Cognitive Status Impaired   Arousal/Participation Cooperative   Orientation Level Oriented to person;Oriented to place;Oriented to time   Following Commands Follows one step commands with increased time or repetition   Comments Pleasant  RUE Assessment   RUE Assessment WFL  (grossly 4-/5)   LUE Assessment   LUE Assessment WFL  (grossly 4-/5)   RLE Assessment   RLE Assessment X  (arthritic changes, tendency to IR RLE in resting )   Strength RLE   R Hip Flexion   (+ pain, unable to test )   R Knee Extension 3-/5   R Ankle Dorsiflexion 3/5   R Ankle Plantar Flexion 3/5   LLE Assessment   LLE Assessment X  (knee arthritic changes with lmited ROM, flexion contracture)   Strength LLE   L Hip Flexion 3-/5   L Knee Extension 2+/5   L Ankle Dorsiflexion 2+/5   L Ankle Plantar Flexion 2+/5   Bed Mobility   Supine to Sit 2  Maximal assistance   Additional items Assist x 1; Increased time required;Verbal cues;LE management   Sit to Supine 2  Maximal assistance   Additional items Assist x 1; Increased time required;Verbal cues;LE management   Additional Comments Increased time  Acheived on 2nd attempt as guarded and resistive to 1st attempt to sit  Tolerated EOB x 1 minute, reporting dizziness with request to return to supine  Unable to obtain BP in seatd  Supine /71  Transfers   Sit to Stand Unable to assess  (pt declined, + dizziness )   Balance   Static Sitting Fair -   Dynamic Sitting Poor   Endurance Deficit   Endurance Deficit Yes   Endurance Deficit Description pain, fatigue, weakness, dizziness  Activity Tolerance   Activity Tolerance Treatment limited secondary to medical complications (Comment); Patient limited by fatigue;Patient limited by pain   Medical Staff Made Aware Nurse, Kristen Fowler   Nurse Made Aware yes   Assessment   Prognosis Good   Problem List Decreased strength;Decreased range of motion;Decreased endurance; Impaired balance;Decreased mobility; Decreased cognition; Impaired judgement;Decreased safety awareness;Pain;Orthopedic restrictions   Assessment Vani Chua is a 63 y o  female who presents with mechanical fall at home  Hx of schizophrenia and seizure disorder with decreased mental capacity for her age   + R femoral neck fracture s/p R hip hemiarthroplasty  PT consulted  WBAT with posterior THR precautions  Activity as tolerated  Prior to admission resides with sister who is involved in her care  Provides assist for ADLS and IADLS  Pt ambulates independently per CM records and orthopedics consult  Currently presents with functional limitations related to post operative pain, decreased R hip ROM and strength, decreased L knee ROM and arthritic changes observed with crepitus  Decreased functional mobility, balance, activity tolerance and ability to progress for ambulation at time of evaluation  Max A for supine<>sit transitions    Limited sitting tolerance at EOB 2* dizziness with request to return to supine  BP in supine 127/71  In supine observed RLE with increased IR positioning  Reinforced THR precautions and pillow placed between legs to help maintain precautions  Given impairments following hip hemiarthroplasty will require ongoing skilled PT in order to address impairments and optimize functional outcomes  Rehab is needed at d/c  Goals   Patient Goals get better   [facility] Expiration Date 01/26/21   Short Term Goal #1 10 days: 1)  Pt will perform bed mobility with Peggy demonstrating appropriate technique 100% of the time in order to improve function  2) Assess transfers and ambulation as appropriate and revise functional goals  3) Tolerate EOB sitting x 15 minutes in order to improve endurance to functional tasks  4)  Improve overall strength and balance 1/2 grade in order to optimize ability to perform functional tasks and reduce fall risk  5) Increase activity tolerance to 45 minutes in order to improve endurance to functional tasks  6) PT for ongoing patient and family/caregiver education, DME needs and d/c planning in order to promote highest level of function in least restrictive environment  Plan   Treatment/Interventions Functional transfer training;LE strengthening/ROM; Elevations; Therapeutic exercise; Endurance training;Patient/family training;Equipment eval/education; Bed mobility; Compensatory technique education;Gait training;Continued evaluation;Spoke to nursing   PT Frequency 5x/wk;7x/wk  (5-7x/wk)   Recommendation   PT Discharge Recommendation Post-Acute Rehabilitation Services   Equipment Recommended Walker   Barthel Index   Feeding 10   Bathing 0   Grooming Score 0   Dressing Score 5   Bladder Score 5   Bowels Score 10   Toilet Use Score 5   Transfers (Bed/Chair) Score 5   Mobility (Level Surface) Score 0   Stairs Score 0   Barthel Index Score 40     History: co - morbidities, fall risk, use of assistive device, assist for adl's, cognition, multiple lines  Exam: impairments in locomotion, musculoskeletal, balance,posture, joint integrity, skin integrity,  cognition   Clinical: unstable/unpredictable ( fall risk, increased assist from baseline, decreased activity tolerance compared to baseline, more restrictive AD use)  Complexity:high      Crista Sales, PT

## 2021-01-17 LAB
ANION GAP SERPL CALCULATED.3IONS-SCNC: 4 MMOL/L (ref 4–13)
ANION GAP SERPL CALCULATED.3IONS-SCNC: 6 MMOL/L (ref 4–13)
BUN SERPL-MCNC: 10 MG/DL (ref 5–25)
BUN SERPL-MCNC: 8 MG/DL (ref 5–25)
CALCIUM SERPL-MCNC: 7.8 MG/DL (ref 8.3–10.1)
CALCIUM SERPL-MCNC: 7.9 MG/DL (ref 8.3–10.1)
CHLORIDE SERPL-SCNC: 96 MMOL/L (ref 100–108)
CHLORIDE SERPL-SCNC: 97 MMOL/L (ref 100–108)
CO2 SERPL-SCNC: 24 MMOL/L (ref 21–32)
CO2 SERPL-SCNC: 27 MMOL/L (ref 21–32)
CREAT SERPL-MCNC: 0.61 MG/DL (ref 0.6–1.3)
CREAT SERPL-MCNC: 0.71 MG/DL (ref 0.6–1.3)
ERYTHROCYTE [DISTWIDTH] IN BLOOD BY AUTOMATED COUNT: 13 % (ref 11.6–15.1)
GFR SERPL CREATININE-BSD FRML MDRD: 91 ML/MIN/1.73SQ M
GFR SERPL CREATININE-BSD FRML MDRD: 97 ML/MIN/1.73SQ M
GLUCOSE SERPL-MCNC: 110 MG/DL (ref 65–140)
GLUCOSE SERPL-MCNC: 110 MG/DL (ref 65–140)
HCT VFR BLD AUTO: 27.3 % (ref 34.8–46.1)
HGB BLD-MCNC: 9.4 G/DL (ref 11.5–15.4)
MCH RBC QN AUTO: 32.3 PG (ref 26.8–34.3)
MCHC RBC AUTO-ENTMCNC: 34.4 G/DL (ref 31.4–37.4)
MCV RBC AUTO: 94 FL (ref 82–98)
PLATELET # BLD AUTO: 149 THOUSANDS/UL (ref 149–390)
PMV BLD AUTO: 8.8 FL (ref 8.9–12.7)
POTASSIUM SERPL-SCNC: 4.1 MMOL/L (ref 3.5–5.3)
POTASSIUM SERPL-SCNC: 4.2 MMOL/L (ref 3.5–5.3)
RBC # BLD AUTO: 2.91 MILLION/UL (ref 3.81–5.12)
SODIUM SERPL-SCNC: 126 MMOL/L (ref 136–145)
SODIUM SERPL-SCNC: 128 MMOL/L (ref 136–145)
WBC # BLD AUTO: 7.38 THOUSAND/UL (ref 4.31–10.16)

## 2021-01-17 PROCEDURE — 85027 COMPLETE CBC AUTOMATED: CPT | Performed by: PHYSICIAN ASSISTANT

## 2021-01-17 PROCEDURE — 97530 THERAPEUTIC ACTIVITIES: CPT

## 2021-01-17 PROCEDURE — 80048 BASIC METABOLIC PNL TOTAL CA: CPT | Performed by: PHYSICIAN ASSISTANT

## 2021-01-17 PROCEDURE — 99232 SBSQ HOSP IP/OBS MODERATE 35: CPT | Performed by: HOSPITALIST

## 2021-01-17 PROCEDURE — 99024 POSTOP FOLLOW-UP VISIT: CPT | Performed by: PHYSICIAN ASSISTANT

## 2021-01-17 PROCEDURE — 80048 BASIC METABOLIC PNL TOTAL CA: CPT | Performed by: HOSPITALIST

## 2021-01-17 PROCEDURE — 97112 NEUROMUSCULAR REEDUCATION: CPT

## 2021-01-17 RX ADMIN — OXYCODONE HYDROCHLORIDE 2.5 MG: 5 TABLET ORAL at 05:22

## 2021-01-17 RX ADMIN — CARBAMAZEPINE 400 MG: 200 TABLET ORAL at 08:05

## 2021-01-17 RX ADMIN — ACETAMINOPHEN 975 MG: 325 TABLET ORAL at 22:27

## 2021-01-17 RX ADMIN — OXYCODONE HYDROCHLORIDE AND ACETAMINOPHEN 500 MG: 500 TABLET ORAL at 08:04

## 2021-01-17 RX ADMIN — IRON SUCROSE 300 MG: 20 INJECTION, SOLUTION INTRAVENOUS at 08:05

## 2021-01-17 RX ADMIN — Medication 1 TABLET: at 08:04

## 2021-01-17 RX ADMIN — PANTOPRAZOLE SODIUM 40 MG: 40 TABLET, DELAYED RELEASE ORAL at 05:21

## 2021-01-17 RX ADMIN — FOLIC ACID 1 MG: 1 TABLET ORAL at 08:05

## 2021-01-17 RX ADMIN — FERROUS SULFATE TAB 325 MG (65 MG ELEMENTAL FE) 325 MG: 325 (65 FE) TAB at 16:16

## 2021-01-17 RX ADMIN — FERROUS SULFATE TAB 325 MG (65 MG ELEMENTAL FE) 325 MG: 325 (65 FE) TAB at 08:05

## 2021-01-17 RX ADMIN — ENOXAPARIN SODIUM 40 MG: 40 INJECTION SUBCUTANEOUS at 08:05

## 2021-01-17 RX ADMIN — ACETAMINOPHEN 975 MG: 325 TABLET ORAL at 13:22

## 2021-01-17 RX ADMIN — SENNOSIDES 8.6 MG: 8.6 TABLET ORAL at 08:04

## 2021-01-17 RX ADMIN — DOCUSATE SODIUM 100 MG: 100 CAPSULE, LIQUID FILLED ORAL at 17:05

## 2021-01-17 RX ADMIN — CARBAMAZEPINE 400 MG: 200 TABLET ORAL at 22:27

## 2021-01-17 RX ADMIN — OLANZAPINE 40 MG: 10 TABLET, FILM COATED ORAL at 22:26

## 2021-01-17 RX ADMIN — DOCUSATE SODIUM 100 MG: 100 CAPSULE, LIQUID FILLED ORAL at 08:04

## 2021-01-17 RX ADMIN — ACETAMINOPHEN 975 MG: 325 TABLET ORAL at 05:22

## 2021-01-17 RX ADMIN — OXYCODONE HYDROCHLORIDE AND ACETAMINOPHEN 500 MG: 500 TABLET ORAL at 17:05

## 2021-01-17 NOTE — ASSESSMENT & PLAN NOTE
· Right femur fracture after tripping over her dog status post ORIF  · Pain controlled    Will need rehab placement

## 2021-01-17 NOTE — ASSESSMENT & PLAN NOTE
Results from last 7 days   Lab Units 01/17/21  0506 01/16/21  0300 01/15/21  0230 01/14/21  1501   SODIUM mmol/L 126* 130* 132* 135*       Likely due to psychiatric medications and ADH release from pain      Will put on a fluid restriction

## 2021-01-17 NOTE — PROGRESS NOTES
Orthopaedic Surgery - Progress Note  Neda Delaney (86 y o  female)   : 1955   MRN: [id number]  Date: 2021   Encounter: [id number]   Unit/Bed#: E2 -01    Assessment / Plan  Postop day 2 status post right hip hemiarthroplasty    · Continue with ice and analgesics as needed  · Weight-bearing as tolerated right lower extremity with posterior precautions  · Hemoglobin 9 4 this morning drop secondary to acute blood loss anemia  No signs of bleeding in the operative leg  Will continue iron supplementation and monitor at this time  · Lovenox 40 mg daily for 30 days as a DVT prophylaxis of choice  · Patient will most likely need rehab placement when medically cleared  · Follow-up 2 weeks postoperatively with Dr Michelle Borja  77-year-old female postop day 2 status post right hip hemiarthroplasty  Patient states she is doing okay at this time denies pain in the operative hip, but does have some soreness down her right thigh  She denies any distal numbness or tingling  Vitals  Temp:  [99 °F (37 2 °C)-99 7 °F (37 6 °C)] 99 3 °F (37 4 °C)  HR:  [] 104  Resp:  [18] 18  BP: (127-150)/(65-78) 150/78  Body mass index is 29 57 kg/m²  I/O last 24 hours: In: -   Out:  [Urine:]    Right Hip Exam  Alignment / Posture:  Normal resting hip posture  Normal knee alignment  Inspection:  Mild right thigh swelling  No erythema  No ecchymosis  Dressing clean dry and intact at this time  Exam of left knee revealed no ecchymosis or swelling  Palpation:  No tenderness at The vivek-incisional area at this time  Palpation of the left knee which she was complaining about pain revealed no tenderness     ROM:  Hip range of motion not performed Normal knee ROM  Strength:  5/5 AT, GSC, PT, and peroneals  Stability:  Not tested  Tests:  No pertinent positive or negative tests  Neurovascular:  Sensation intact in DP/SP/Auguste/Sa/T nerve distributions   Sensation intact in all digital nerve distributions  Toes warm and perfused  Gait:  Not tested  Lab Results  (I have personally reviewed pertinent lab results )  Results from last 7 days   Lab Units 01/17/21  0506 01/16/21  0300 01/15/21  0230 01/14/21  1501   WBC Thousand/uL 7 38 9 06 8 16 15 68*   HEMOGLOBIN g/dL 9 4* 10 3* 11 4* 12 5   HEMATOCRIT % 27 3* 31 2* 33 2* 36 7   PLATELETS Thousands/uL 149 144* 193 207         Results from last 7 days   Lab Units 01/17/21  0506 01/16/21  0300 01/15/21  0230 01/14/21  1501   POTASSIUM mmol/L 4 2 4 4 3 3* 3 7   CHLORIDE mmol/L 96* 99* 97* 101   CO2 mmol/L 24 26 27 28   BUN mg/dL 10 16 9 10   CREATININE mg/dL 0 61 1 10 0 62 0 62   EGFR ml/min/1 73sq m 97 54 96 96   CALCIUM mg/dL 7 9* 7 9* 8 3 8 8   ALK PHOS U/L  --   --  73  --    ALT U/L  --   --  21  --    AST U/L  --   --  17  --          Results from last 7 days   Lab Units 01/14/21  2347 01/14/21  2335   BLOOD CULTURE  No Growth at 48 hrs  No Growth at 48 hrs         Raeann Faulkner PA-C

## 2021-01-17 NOTE — PHYSICAL THERAPY NOTE
PHYSICAL THERAPY NOTE  10:30-11:23 ( 53 minutes)          Patient Name: Ar Johnson  DSTTI'E Date: 1/17/2021 01/17/21 1123   PT Last Visit   PT Visit Date 01/17/21   Note Type   Note Type Treatment   Pain Assessment   Pain Score No Pain   Restrictions/Precautions   RLE Weight Bearing Per Order WBAT   Other Precautions Cognitive; Chair Alarm; Bed Alarm;THR;Seizure;Multiple lines; Fall Risk;Pain;WBS   General   Chart Reviewed Yes   Response to Previous Treatment Patient with no complaints from previous session  Family/Caregiver Present No   Cognition   Overall Cognitive Status Impaired   Orientation Level Oriented to person;Oriented to place   Following Commands Follows one step commands with increased time or repetition   Comments Slightly lethargic this am    Subjective   Subjective "Tired"  Agreeable to therapy  Bed Mobility   Rolling R 3  Moderate assistance   Additional items Assist x 2; Increased time required;Verbal cues   Rolling L 3  Moderate assistance   Additional items Assist x 2; Increased time required;Verbal cues; Other;Bedrails;HOB elevated   Supine to Sit 2  Maximal assistance   Additional items Assist x 2; Increased time required;Verbal cues;LE management; Bedrails;HOB elevated   Sit to Supine 2  Maximal assistance   Additional items Assist x 2; Increased time required;Verbal cues;LE management; Bedrails;HOB elevated   Additional Comments Increased time to complete  A to maintain THR precautions with transition  Positioned in supine with increased RLE IR  Increased tone B/L LE  /91 EOB despite reports of dizziness  Transfers   Sit to Stand 2  Maximal assistance   Additional items Assist x 2;Assist x 3; Increased time required;Verbal cues  (3rd person for safety and support of RW )   Stand to Sit 2  Maximal assistance   Additional items Assist x 2;Assist x 3; Increased time required;Verbal cues  (3rd person to assist with safe return to seated EOB )   Additional Comments Difficulty with upright stance  Initiates well then B/L knee buckling, retropulsion with need to assist safely onto bed  Trial of sit to stand x 2 during session  Not appropriate to progress  Suggest trial of quickmove next session  Ambulation/Elevation   Gait pattern Not appropriate  (given inability to safely complete and maintain standing )   Balance   Static Sitting Fair -   Dynamic Sitting Poor   Static Standing Zero   Endurance Deficit   Endurance Deficit Yes   Endurance Deficit Description weakness, fatigue, dizziness, pain  Activity Tolerance   Activity Tolerance Patient limited by fatigue;Treatment limited secondary to medical complications (Comment); Patient limited by pain   Medical Staff Julio Melgoza U  8  Farzana Galeano  Nurse Made Aware yes-both present to assist with mobility  Assessment   Prognosis Fair   Problem List Decreased strength;Decreased range of motion;Decreased endurance; Impaired balance;Decreased mobility; Decreased coordination;Decreased cognition; Impaired judgement;Decreased safety awareness; Impaired tone;Orthopedic restrictions;Pain   Assessment Seen for attempt at progress with mobility  Incontinent of urine during session  Lethargic initially with increased time to improve level of alertness  Mod A of 2 to assist with rolling for pericare  Max A of 2 to perform supine<>sit transitions  Decreased sitting balance, continual support needed in sitting  BP stable 129/91 EOB despite dizziness  Tolerated EOB sitting x 10 minutes  Attempt to progress with mobility and transfers  Trial of sit to stand x 2 with max A of 2, 3rd person to assist with walker stabilization and LE postioning  Second trial slightly more upright, however with poor posture, LE buckling, retropulsion with hips/pelvis translating forward rather than upward posture  Returned to seated and then to supine for safety    Repositioned with pillow to minimize RLE IR in supine  Unable to verbalize THR precautions  May trial quickmove next session to work on transfers and promote upright balance  Noted to have increased LE hypertonia, discussed same with Dr Rainer Hernandez  Given impairments and need for increased assistance with decline from functional baseline will require ongoing PT and rehab at d/c in order to address impairments following hip hemiarthroplasty  Goals   Patient Goals get better  [facility] Expiration Date 01/26/21   PT Treatment Day 1   Plan   Treatment/Interventions Functional transfer training;LE strengthening/ROM; Elevations; Therapeutic exercise; Endurance training;Patient/family training;Equipment eval/education; Bed mobility;Gait training; Compensatory technique education;Continued evaluation;Spoke to nursing;Spoke to MD   Progress Slow progress, decreased activity tolerance   PT Frequency 5x/wk;7x/wk  (5-7x/wk)   Recommendation   PT Discharge Recommendation Post-Acute Rehabilitation Services   Equipment Recommended Walker   PT - OK to Discharge   (yes to rhb when medically stable   No to home,)   Loise Gear, PT

## 2021-01-17 NOTE — DISCHARGE INSTRUCTIONS
POSTOPERATIVE INSTRUCTIONS    MEDICATIONS:    · Blood Clot Prevention:  Lovenox 40 mg, 1 dose daily for 30 days  · Pump your foot up and down 20 times per hour while you are less mobile  WOUND CARE:  · Keep the dressing clean and dry  Light drainage may occur the first 2 days postop  · Mepilex dressing for 7 days then dry dressing change only if needed  · Please call our office ([phone]) if you experience any of the following:  · Sudden increase in swelling, redness, or warmth at the surgical site  · Excessive incisional drainage that persists beyond the 3rd day after surgery  · Oral temperature greater than 101 degrees, not relieved with Tylenol  · Shortness of breath, chest pain, nausea, or any other concerning symptoms    SWELLING CONTROL:  · Cold Therapy:  Apply ice (20 min on, 20 min off) as often as you feel is necessary  · Elevation:  Elevate the entire leg above heart level as much as possible  · Compression:  Apply ACE wraps or a compression stocking as needed  RANGE OF MOTION:  · Follow posterior hip precautions as instructed by PT      ACTIVITY:  · BEAR FULL WEIGHT AS TOLERATED on the operative leg  Use crutches to assist only as needed  · Using Crutches on Stairs:  Going up, lead with your "good" (nonoperative) leg  Going down, lead with your "bad" (operative) leg  Use a hand rail when available  · Quad Sets:  Sit or lie with your knee straight  Tighten your quadriceps (front thigh) muscle  Hold for 3 seconds, then relax  Repeat 20 times per hour while awake  PHYSICAL THERAPY:  · You will be given a physical therapy prescription when you are seen in the office for your postoperative appointment  FOLLOW-UP APPOINTMENT:  · 2 weeks after surgery with:    Dr Palmer Edward Grayson, [address] [facility]  [address] [facility], [address] [place] [address], [address]  [phone] ([address])  [phone] (After Hours)
89 yo M p/w c/o R hearing aid not working, stuck and unable to come out.  Pt requesting hearing aid be taken out.  Unclear when last replacement occurred.  L hearing aid working.

## 2021-01-17 NOTE — PLAN OF CARE
Problem: PHYSICAL THERAPY ADULT  Goal: Performs mobility at highest level of function for planned discharge setting  See evaluation for individualized goals  Description: Treatment/Interventions: Functional transfer training, LE strengthening/ROM, Elevations, Therapeutic exercise, Endurance training, Patient/family training, Equipment eval/education, Bed mobility, Compensatory technique education, Gait training, Continued evaluation, Spoke to nursing  Equipment Recommended: Jo Hercules       See flowsheet documentation for full assessment, interventions and recommendations  Outcome: Progressing  Note: Prognosis: Fair  Problem List: Decreased strength, Decreased range of motion, Decreased endurance, Impaired balance, Decreased mobility, Decreased coordination, Decreased cognition, Impaired judgement, Decreased safety awareness, Impaired tone, Orthopedic restrictions, Pain  Assessment: Seen for attempt at progress with mobility  Incontinent of urine during session  Lethargic initially with increased time to improve level of alertness  Mod A of 2 to assist with rolling for pericare  Max A of 2 to perform supine<>sit transitions  Decreased sitting balance, continual support needed in sitting  BP stable 129/91 EOB despite dizziness  Tolerated EOB sitting x 10 minutes  Attempt to progress with mobility and transfers  Trial of sit to stand x 2 with max A of 2, 3rd person to assist with walker stabilization and LE postioning  Second trial slightly more upright, however with poor posture, LE buckling, retropulsion with hips/pelvis translating forward rather than upward posture  Returned to seated and then to supine for safety  Repositioned with pillow to minimize RLE IR in supine  Unable to verbalize THR precautions  May trial quickmove next session to work on transfers and promote upright balance  Noted to have increased LE hypertonia, discussed same with Dr Reinaldo Osborn    Given impairments and need for increased assistance with decline from functional baseline will require ongoing PT and rehab at d/c in order to address impairments following hip hemiarthroplasty  PT Discharge Recommendation: Post-Acute Rehabilitation Services          See flowsheet documentation for full assessment

## 2021-01-17 NOTE — PROGRESS NOTES
Progress Note Valerie Gonzalez 1955, 63 y o  female MRN: [id number]    Unit/Bed#: E2 -05 Encounter: [id number]    Primary Care Provider: John Avila DO   Date and time admitted to hospital: 2021  1:19 PM        * Femur fracture, right ([facility] [address] )  Assessment & Plan  Tripped over her dog  She had ORIF right femur    Pain is easily controlled    Hyponatremia  Assessment & Plan      Results from last 7 days   Lab Units 21  0506 21  0300 01/15/21  0230 21  1501   SODIUM mmol/L 126* 130* 132* 135*       Likely due to psychiatric medications and ADH release from pain  Will put on a fluid restriction    Seizure disorder ([facility] )  Assessment & Plan  · Reported history of seizure continue carbamazepine    Schizophrenia ([facility] )  Assessment & Plan  · Schizophrenia mood stable on zyprexa          Subjective:   Feels well  Pain to right hip is well controlled    She had a lot of trouble walking with PT today  Objective:     Vitals:   Temp (24hrs), Av 2 °F (37 3 °C), Min:98 8 °F (37 1 °C), Max:99 7 °F (37 6 °C)    Temp:  [98 8 °F (37 1 °C)-99 7 °F (37 6 °C)] 98 8 °F (37 1 °C)  HR:  [] 98  Resp:  [16-18] 16  BP: (129-151)/(65-91) 151/76  SpO2:  [95 %-98 %] 98 %  Body mass index is 29 57 kg/m²  Input and Output Summary (last 24 hours): Intake/Output Summary (Last 24 hours) at 2021 1717  Last data filed at 2021 1541  Gross per 24 hour   Intake 120 ml   Output 3746 ml   Net -3626 ml       Physical Exam:     Physical Exam  Vitals signs and nursing note reviewed  HENT:      Head: Normocephalic and atraumatic  Eyes:      Pupils: Pupils are equal, round, and reactive to light  Cardiovascular:      Rate and Rhythm: Normal rate and regular rhythm  Heart sounds: No murmur  No friction rub  No gallop  Pulmonary:      Effort: Pulmonary effort is normal       Breath sounds: Normal breath sounds  No wheezing or rales     Abdominal:      General: Bowel sounds are normal       Palpations: Abdomen is soft  Tenderness: There is no abdominal tenderness  Musculoskeletal:      Right lower leg: No edema  Left lower leg: No edema  Comments: Right hip dressing C/D/I         Additional Data:     Labs:    Results from last 7 days   Lab Units 01/17/21  0506 01/16/21  0300   WBC Thousand/uL 7 38 9 06   HEMOGLOBIN g/dL 9 4* 10 3*   HEMATOCRIT % 27 3* 31 2*   PLATELETS Thousands/uL 149 144*   NEUTROS PCT %  --  83*   LYMPHS PCT %  --  7*   MONOS PCT %  --  10   EOS PCT %  --  0     Results from last 7 days   Lab Units 01/17/21  0506  01/15/21  0230   POTASSIUM mmol/L 4 2   < > 3 3*   CHLORIDE mmol/L 96*   < > 97*   CO2 mmol/L 24   < > 27   BUN mg/dL 10   < > 9   CREATININE mg/dL 0 61   < > 0 62   CALCIUM mg/dL 7 9*   < > 8 3   ALK PHOS U/L  --   --  73   ALT U/L  --   --  21   AST U/L  --   --  17    < > = values in this interval not displayed  * I Have Reviewed All Lab Data     Recent Cultures (last 7 days):     Results from last 7 days   Lab Units 01/14/21  2347 01/14/21  2335   BLOOD CULTURE  No Growth at 48 hrs  No Growth at 48 hrs           Last 24 Hours Medication List:   Current Facility-Administered Medications   Medication Dose Route Frequency Provider Last Rate    acetaminophen  650 mg Oral Q6H PRN Aleatha Sabal, PA-C      acetaminophen  975 mg Oral Q8H [address] Aleatha Sabal, PA-C      ascorbic acid  500 mg Oral BID Aleatha Sabal, PA-C      calcium carbonate  1,000 mg Oral Daily PRN Aleatha Sabal, PA-C      carBAMazepine  400 mg Oral BID Aleatha Sabal, PA-C      docusate sodium  100 mg Oral BID Aleatha Sabal, PA-C      enoxaparin  40 mg Subcutaneous Daily Aleatha Sabal, PA-C      ferrous sulfate  325 mg Oral BID With Meals Aleatha Sabal, PA-C      folic acid  1 mg Oral Daily Aleatha Sabal, PA-C      morphine injection  1 mg Intravenous Q4H PRN Aleatha Sabal, PA-C      multivitamin-minerals  1 tablet Oral Daily Luis Antonio Gibbons, PA-C      OLANZapine  40 mg Oral HS Luis Antonio Gibbons, PA-C      ondansetron  4 mg Intravenous Q6H PRN Luis Antonio Gibbons, PA-C      oxyCODONE  2 5 mg Oral Q4H PRN Luis Antonio Gibbons, PA-C      pantoprazole  40 mg Oral Daily Before Breakfast Luis Antonio Gibbons, PA-C      senna  1 tablet Oral Daily Luis Antonio Gibbons, PA-C      tranexamic acid (CYKLOKAPRON) IV bolus  1,000 mg Intravenous Once BENEDICTO Sarabia-C           VTE Pharmacologic Prophylaxis:   Pharmacologic: Enoxaparin (Lovenox)      Current Length of Stay: 3 day(s)    Current Patient Status: Inpatient       Discharge Plan: needs SNF    Code Status: Level 3 - DNAR and DNI           Today, Patient Was Seen By: Jean Carlos Kemp DO    ** Please Note: Dictation voice to text software may have been used in the creation of this document   **

## 2021-01-17 NOTE — ASSESSMENT & PLAN NOTE
· Hypokalemia repleted    Results from last 7 days   Lab Units 01/16/21  0300 01/15/21  0230 01/14/21  1501   POTASSIUM mmol/L 4 4 3 3* 3 7

## 2021-01-17 NOTE — PROGRESS NOTES
Progress Note Theo Almanzar 1955, 63 y o  female MRN: [id number]    Unit/Bed#: E2 -67 Encounter: [id number]    Primary Care Provider: Moiz White DO   Date and time admitted to hospital: 1/14/2021  1:19 PM        * Femur fracture, right ([facility] )  Assessment & Plan  · Right femur fracture after tripping over her dog status post ORIF  · Pain controlled  Will need rehab placement    Hyponatremia  Assessment & Plan  · Hyponatremia may be poly factorial in the setting carbamazepine use  · Will DC IV fluids    Results from last 7 days   Lab Units 01/16/21  0300 01/15/21  0230 01/14/21  1501   SODIUM mmol/L 130* 132* 135*       Hypokalemia  Assessment & Plan  · Hypokalemia repleted    Results from last 7 days   Lab Units 01/16/21  0300 01/15/21  0230 01/14/21  1501   POTASSIUM mmol/L 4 4 3 3* 3 7       Seizure disorder (HCC)  Assessment & Plan  · Reported history of seizure continue carbamazepine    Schizophrenia ([facility] )  Assessment & Plan  · Schizophrenia mood stable on zyprexa      VTE Pharmacologic Prophylaxis: VTE Score: 9 High Risk (Score >/= 5) - Pharmacological DVT Prophylaxis Ordered: Enoxaparin (Lovenox)  Sequential Compression Devices Ordered  Patient Centered Rounds: I have performed bedside rounds with nursing staff today  Discussions with Specialists or Other Care Team Provider:     Education and Discussions with Family / Patient:     Time Spent for Care: 25 mins  More than 50% of total time spent on counseling and coordination of care as described above  Current Length of Stay: 2 day(s)  Current Patient Status: Inpatient   Certification Statement: The patient will continue to require additional inpatient hospital stay due to need placement  Discharge Plan / Estimated Discharge Date: Anticipate discharge in 48-72 hrs to rehab facility  Code Status: Level 3 - DNAR and DNI      Subjective:   Patient seen and examined  Pain control  No chest pain      Objective:   Vitals: Blood pressure 134/65, pulse 91, temperature 99 2 °F (37 3 °C), temperature source Temporal, resp  rate 18, height 5' 5" (1 651 m), weight 79 6 kg (175 lb 7 8 oz), SpO2 97 %  Physical Exam  Vitals signs reviewed  Constitutional:       General: She is not in acute distress  Appearance: Normal appearance  Eyes:      General: No scleral icterus  Cardiovascular:      Rate and Rhythm: Regular rhythm  Heart sounds: Normal heart sounds  Pulmonary:      Breath sounds: Decreased breath sounds present  No wheezing  Abdominal:      General: Bowel sounds are normal       Palpations: Abdomen is soft  Tenderness: There is no guarding or rebound  Musculoskeletal:         General: No swelling  Neurological:      Mental Status: She is alert  Mental status is at baseline  Psychiatric:         Mood and Affect: Mood normal        Additional Data:   Labs:  Results from last 7 days   Lab Units 01/16/21  0300 01/15/21  0230 01/14/21  1501   WBC Thousand/uL 9 06 8 16 15 68*   HEMOGLOBIN g/dL 10 3* 11 4* 12 5   HEMATOCRIT % 31 2* 33 2* 36 7   MCV fL 97 95 96   PLATELETS Thousands/uL 144* 193 207     Results from last 7 days   Lab Units 01/16/21  0300 01/15/21  0230 01/14/21  1501   SODIUM mmol/L 130* 132* 135*   POTASSIUM mmol/L 4 4 3 3* 3 7   CHLORIDE mmol/L 99* 97* 101   CO2 mmol/L 26 27 28   ANION GAP mmol/L 5 8 6   BUN mg/dL 16 9 10   CREATININE mg/dL 1 10 0 62 0 62   CALCIUM mg/dL 7 9* 8 3 8 8   ALBUMIN g/dL  --  3 1*  --    TOTAL BILIRUBIN mg/dL  --  0 89  --    ALK PHOS U/L  --  73  --    ALT U/L  --  21  --    AST U/L  --  17  --    EGFR ml/min/1 73sq m 54 96 96   GLUCOSE RANDOM mg/dL 133 96 100     Results from last 7 days   Lab Units 01/16/21  0300   MAGNESIUM mg/dL 1 9              Results from last 7 days   Lab Units 01/16/21 0300 01/14/21  2344   LACTIC ACID mmol/L  --  0 8   PROCALCITONIN ng/ml 0 14  --                  * I Have Reviewed All Lab Data Listed Above      Cultures:   Results from last 7 days   Lab Units 01/14/21  2347 01/14/21  2338 01/14/21  2335   BLOOD CULTURE  No Growth at 24 hrs   --  No Growth at 24 hrs  INFLUENZA A PCR   --  Negative  --        Results from last 7 days   Lab Units 01/14/21  2338   SARS-COV-2  Negative   INFLUENZA A PCR  Negative   INFLUENZA B PCR  Negative   RSV PCR  Negative           Lines/Drains:  Invasive Devices     Peripheral Intravenous Line            Peripheral IV 01/15/21 Dorsal (posterior); Left Hand 1 day              Telemetry:      Imaging:  Imaging Reports Reviewed Today Include:   Xr Hip/pelv 2-3 Vws Right    Result Date: 1/14/2021  Impression: Right femoral neck fracture  Findings concur with the preliminary report by the referring clinician already in PACS and/or our electronic record EPIC  Workstation performed: [id number]     Xr Femur 2 Views Right    Result Date: 1/14/2021  Impression: Right femoral neck fracture  Findings concur with the preliminary report by the referring clinician already in PACS and/or our electronic record EPIC  Workstation performed: [id number]     Xr Chest 1 View    Result Date: 1/14/2021  Impression: No focal airspace consolidation identified   Workstation performed: [id number]     Scheduled Meds:  Current Facility-Administered Medications   Medication Dose Route Frequency Provider Last Rate    acetaminophen  650 mg Oral Q6H PRN Milton Reach, PA-C      acetaminophen  975 mg Oral Q8H Hobert [place], PA-C      ascorbic acid  500 mg Oral BID Milton Reach, PA-C      calcium carbonate  1,000 mg Oral Daily PRN Milton Reach, PA-C      carBAMazepine  400 mg Oral BID Milton Reach, PA-C      docusate sodium  100 mg Oral BID Milton Reach, PA-C      enoxaparin  40 mg Subcutaneous Daily Milton Reach, PA-C      ferrous sulfate  325 mg Oral BID With Meals Milton Reach, PA-C      folic acid  1 mg Oral Daily Milton Reach, PA-C      iron sucrose  300 mg Intravenous Daily Milton Reach, PA-C      lactated ringers  75 mL/hr Intravenous Continuous Bette Brown PA-C Stopped (01/16/21 1601)    morphine injection  1 mg Intravenous Q4H PRN Bette Brown PA-C      multivitamin-minerals  1 tablet Oral Daily Cortez Britton PA-C      OLANZapine  40 mg Oral HS Bette Brown PA-C      ondansetron  4 mg Intravenous Q6H PRN Bette Brown PA-C      oxyCODONE  2 5 mg Oral Q4H PRN Bette Brown PA-C      pantoprazole  40 mg Oral Daily Before Breakfast Bette Brown PA-C      senna  1 tablet Oral Daily Cortez Britton PA-C      tranexamic acid (CYKLOKAPRON) IV bolus  1,000 mg Intravenous Once VITA [facility]  Hospitalist    ** Please Note: This note has been constructed using a voice recognition system   **

## 2021-01-17 NOTE — ASSESSMENT & PLAN NOTE
· Hyponatremia may be poly factorial in the setting carbamazepine use  · Will DC IV fluids    Results from last 7 days   Lab Units 01/16/21  0300 01/15/21  0230 01/14/21  1501   SODIUM mmol/L 130* 132* 135*

## 2021-01-18 VITALS
BODY MASS INDEX: 29.68 KG/M2 | WEIGHT: 178.13 LBS | TEMPERATURE: 100 F | HEIGHT: 65 IN | OXYGEN SATURATION: 95 % | RESPIRATION RATE: 18 BRPM | SYSTOLIC BLOOD PRESSURE: 166 MMHG | HEART RATE: 90 BPM | DIASTOLIC BLOOD PRESSURE: 85 MMHG

## 2021-01-18 LAB
ANION GAP SERPL CALCULATED.3IONS-SCNC: 4 MMOL/L (ref 4–13)
BUN SERPL-MCNC: 8 MG/DL (ref 5–25)
CALCIUM SERPL-MCNC: 8.2 MG/DL (ref 8.3–10.1)
CHLORIDE SERPL-SCNC: 102 MMOL/L (ref 100–108)
CO2 SERPL-SCNC: 28 MMOL/L (ref 21–32)
CREAT SERPL-MCNC: 0.54 MG/DL (ref 0.6–1.3)
ERYTHROCYTE [DISTWIDTH] IN BLOOD BY AUTOMATED COUNT: 13.2 % (ref 11.6–15.1)
GFR SERPL CREATININE-BSD FRML MDRD: 101 ML/MIN/1.73SQ M
GLUCOSE SERPL-MCNC: 90 MG/DL (ref 65–140)
HCT VFR BLD AUTO: 28.4 % (ref 34.8–46.1)
HGB BLD-MCNC: 9.8 G/DL (ref 11.5–15.4)
MCH RBC QN AUTO: 32.6 PG (ref 26.8–34.3)
MCHC RBC AUTO-ENTMCNC: 34.5 G/DL (ref 31.4–37.4)
MCV RBC AUTO: 94 FL (ref 82–98)
PLATELET # BLD AUTO: 187 THOUSANDS/UL (ref 149–390)
PMV BLD AUTO: 8.3 FL (ref 8.9–12.7)
POTASSIUM SERPL-SCNC: 3.9 MMOL/L (ref 3.5–5.3)
RBC # BLD AUTO: 3.01 MILLION/UL (ref 3.81–5.12)
SODIUM SERPL-SCNC: 134 MMOL/L (ref 136–145)
WBC # BLD AUTO: 7.22 THOUSAND/UL (ref 4.31–10.16)

## 2021-01-18 PROCEDURE — 97116 GAIT TRAINING THERAPY: CPT | Performed by: PHYSICAL THERAPIST

## 2021-01-18 PROCEDURE — 97167 OT EVAL HIGH COMPLEX 60 MIN: CPT

## 2021-01-18 PROCEDURE — 99239 HOSP IP/OBS DSCHRG MGMT >30: CPT | Performed by: INTERNAL MEDICINE

## 2021-01-18 PROCEDURE — 80048 BASIC METABOLIC PNL TOTAL CA: CPT | Performed by: PHYSICIAN ASSISTANT

## 2021-01-18 PROCEDURE — 97110 THERAPEUTIC EXERCISES: CPT | Performed by: PHYSICAL THERAPIST

## 2021-01-18 PROCEDURE — 99024 POSTOP FOLLOW-UP VISIT: CPT | Performed by: PHYSICIAN ASSISTANT

## 2021-01-18 PROCEDURE — 85027 COMPLETE CBC AUTOMATED: CPT | Performed by: PHYSICIAN ASSISTANT

## 2021-01-18 RX ORDER — PANTOPRAZOLE SODIUM 40 MG/1
40 TABLET, DELAYED RELEASE ORAL
Qty: 30 TABLET | Refills: 0
Start: 2021-01-19 | End: 2022-06-21

## 2021-01-18 RX ORDER — ACETAMINOPHEN 325 MG/1
975 TABLET ORAL EVERY 8 HOURS SCHEDULED
Qty: 90 TABLET | Refills: 0
Start: 2021-01-18 | End: 2021-01-28

## 2021-01-18 RX ORDER — DOCUSATE SODIUM 100 MG/1
100 CAPSULE, LIQUID FILLED ORAL 2 TIMES DAILY
Qty: 10 CAPSULE | Refills: 0
Start: 2021-01-18 | End: 2022-06-21

## 2021-01-18 RX ADMIN — DOCUSATE SODIUM 100 MG: 100 CAPSULE, LIQUID FILLED ORAL at 08:30

## 2021-01-18 RX ADMIN — ENOXAPARIN SODIUM 40 MG: 40 INJECTION SUBCUTANEOUS at 08:30

## 2021-01-18 RX ADMIN — OXYCODONE HYDROCHLORIDE AND ACETAMINOPHEN 500 MG: 500 TABLET ORAL at 08:30

## 2021-01-18 RX ADMIN — FOLIC ACID 1 MG: 1 TABLET ORAL at 08:31

## 2021-01-18 RX ADMIN — ACETAMINOPHEN 975 MG: 325 TABLET ORAL at 05:31

## 2021-01-18 RX ADMIN — ACETAMINOPHEN 975 MG: 325 TABLET ORAL at 13:12

## 2021-01-18 RX ADMIN — Medication 1 TABLET: at 08:30

## 2021-01-18 RX ADMIN — SENNOSIDES 8.6 MG: 8.6 TABLET ORAL at 08:30

## 2021-01-18 RX ADMIN — PANTOPRAZOLE SODIUM 40 MG: 40 TABLET, DELAYED RELEASE ORAL at 05:31

## 2021-01-18 RX ADMIN — CARBAMAZEPINE 400 MG: 200 TABLET ORAL at 08:31

## 2021-01-18 RX ADMIN — FERROUS SULFATE TAB 325 MG (65 MG ELEMENTAL FE) 325 MG: 325 (65 FE) TAB at 08:30

## 2021-01-18 NOTE — ASSESSMENT & PLAN NOTE
Quality 111:Pneumonia Vaccination Status For Older Adults: Pneumococcal Vaccination Previously Received · Schizophrenia mood stable on zyprexa Quality 110: Preventive Care And Screening: Influenza Immunization: Influenza Immunization not Administered because Patient Refused. Detail Level: Detailed

## 2021-01-18 NOTE — ASSESSMENT & PLAN NOTE
This is a 12year-old female with history of schizophrenia and seizure disorder presented after suffering a mechanical fall at home after she tripped over a dog      · Status post ORIF  · Discharged to rehab today  · Lovenox 40 mg subcu daily for DVT prophylaxis for 30 days

## 2021-01-18 NOTE — ASSESSMENT & PLAN NOTE
This is a 12year-old female with history of schizophrenia and seizure disorder presented after suffering a mechanical fall at home after she tripped over a dog      · Status post ORIF  · Will need rehab placement

## 2021-01-18 NOTE — DISCHARGE SUMMARY
Discharge- Ary Dow 1955, 63 y o  female MRN: [id number]    Unit/Bed#: E2 -65 Encounter: [id number]    Primary Care Provider: Courtney Diaz DO   Date and time admitted to hospital: 1/14/2021  1:19 PM        * Femur fracture, right [facility])  Assessment & Plan  This is a 12year-old female with history of schizophrenia and seizure disorder presented after suffering a mechanical fall at home after she tripped over a dog  · Status post ORIF  · Discharged to rehab today  · Lovenox 40 mg subcu daily for DVT prophylaxis for 30 days    Hyponatremia  Assessment & Plan      Results from last 7 days   Lab Units 01/18/21  0521 01/17/21  1752 01/17/21  0506 01/16/21  0300 01/15/21  0230 01/14/21  1501   SODIUM mmol/L 134* 128* 126* 130* 132* 135*       · Likely secondary to carbamazepine use and ADH release from pain   · Continue fluid restriction    Seizure disorder (HCC)  Assessment & Plan  · Reported history of seizure continue carbamazepine    Schizophrenia ([facility] [address] )  Assessment & Plan  · Schizophrenia mood stable on zyprexa        Transition of Care Discharge Summary - [facility]     Patient Information: Ary Dow 63 y o  female MRN: [id number]  Unit/Bed#: E2 -50 Encounter: [id number]     Discharging Physician / Practitioner: Oxana Medina MD  PCP: Courtney Diaz DO  Admission Date: 1/14/2021  Discharge Date: 01/18/21     Disposition:       Acute Rehab Facility at [facility]  Reason for Admission:  Fall     Discharge Diagnoses:      Principal Problem:    Femur fracture, right ([facility] [address] )  Active Problems:    Schizophrenia ([facility] [address] )    Seizure disorder ([facility] [address] )    Fever    Hypokalemia    Hyponatremia  Resolved Problems:    * No resolved hospital problems   *        Consultations During Hospital Stay:  · IP CONSULT TO ORTHOPEDIC SURGERY  · IP CONSULT TO PHYSICAL MEDICINE REHAB  · IP CONSULT TO CASE MANAGEMENT  · IP CONSULT TO PSYCHIATRY        Procedures Performed:    · ORIF     Medication Adjustments and Discharge Medications:  · Medication Dosing Tapers - Please refer to Discharge Medication List for details on any medication dosing tapers (if applicable to patient)  · Discharge Medication List: See after visit summary for reconciled discharge medications       Wound Care Recommendations:  When applicable, please see wound care section of After Visit Summary      Diet Recommendations at Discharge:  Diet -              Diet Orders   (From admission, onward)                             Start     Ordered      01/17/21 1720   Diet Regular; Regular House; Fluid Restriction 1200 ML  Diet effective now     Question Answer Comment   Diet Type Regular     Regular Regular House     Other Restriction(s): Fluid Restriction 1200 ML     RD to adjust diet per protocol? Yes         01/17/21 1721                  Fluid Restriction - Continue Fluid Restriction as Listed Above at Discharge         Significant Findings / Test Results:      · X-ray revealed right femoral neck fracture     Hospital Course:      Vani Chua is a 63 y o  female patient who originally presented to the hospital on 1/14/2021 due to mechanical fall after tripping over a dog  Has history of schizophrenia, seizure disorder, suffered a right femoral neck fracture  Orthopedic consulted and patient underwent ORIF  PT recommending rehab  Patient is otherwise medically stable for discharge to rehab today    Updated patient's sister via telephone      Please see above problem list for further details        Condition at Discharge: good      Discharge Day Visit / Exam:      Subjective:  Patient seen examined at bedside, denies any complaints     Vitals: Blood Pressure: 166/85 (01/18/21 0708)  Pulse: 90 (01/18/21 0708)  Temperature: 100 °F (37 8 °C) (01/18/21 0708)  Temp Source: Temporal (01/18/21 0708)  Respirations: 18 (01/18/21 0708)  Height: 5' 5" (165 1 cm) (01/15/21 0900)  Weight - Scale: 80 8 kg (178 lb 2 1 oz) (01/18/21 0534)  SpO2: 95 % (01/18/21 9489)     Physical Exam:     Constitutional: Patient is in no acute distress  HEENT:  Normocephalic, atraumatic  Cardiovascular: Normal S1S2, RRR, No murmurs/rubs/gallops appreciated  Pulmonary:  Bilateral air entry, No rhonchi/rales/wheezing appreciated  Abdominal: Soft, Bowel sounds present, Non-tender, Non-distended  Extremities:  No cyanosis, clubbing or edema  Neurological: Cranial nerves II-XII grossly intact, sensation intact, otherwise no focal neurological symptoms       Discharge instructions/Information to patient and family:   See after visit summary section titled Discharge Instructions for information provided to patient and family        Planned Readmission: no  Discharge Statement:  I spent 35 minutes discharging the patient  This time was spent on the day of discharge  I had direct contact with the patient on the day of discharge  Greater than 50% of the total time was spent examining patient, answering all patient questions, arranging and discussing plan of care with patient as well as directly providing post-discharge instructions    Additional time then spent on discharge activities      ** Please Note: This note has been constructed using a voice recognition system **

## 2021-01-18 NOTE — PROGRESS NOTES
Orthopaedic Surgery - Progress Note  Misty lLoyd (70 y o  female)   : 1955   MRN: [id number]  Date: 2021   Encounter: [id number]   Unit/Bed#: E2 -01    Assessment / Plan  Postop day 3 status post right hip hemiarthroplasty    · Continue with ice and analgesics as needed  · Weight-bearing as tolerated right lower extremity with posterior precautions  · Hemoglobin increased to 9 8 this morning, drop from baseline secondary to acute blood loss anemia  No signs of bleeding in the operative leg  Will continue iron supplementation and monitor at this time  · Lovenox 40 mg daily for 30 days as a DVT prophylaxis of choice  · Patient will most likely need rehab placement when medically cleared  · Follow-up 2 weeks postoperatively with Dr Erik Jaimes  71-year-old female postop day 3 status post right hip hemiarthroplasty  Patients pain seems well controlled at this time  She states she can't move around well yet due to the pain  She denies any distal numbness or tingling  Vitals  Temp:  [98 4 °F (36 9 °C)-100 °F (37 8 °C)] 100 °F (37 8 °C)  HR:  [82-98] 90  Resp:  [16-18] 18  BP: (129-166)/(76-91) 166/85  Body mass index is 29 64 kg/m²  I/O last 24 hours: In: 2283 [P O :1170]  Out: 5850 [Urine:5850]    Right Hip Exam  Alignment / Posture:  Normal resting hip posture  Normal knee alignment  Inspection:  Mild right thigh swelling  No erythema  No ecchymosis  Dressing clean dry and intact at this time  Exam of left knee revealed no ecchymosis or swelling  Palpation:  No tenderness at The vivek-incisional area at this time  Palpation of the left knee which she was complaining about pain revealed no tenderness     ROM:  Hip range of motion not performed Normal knee ROM  Strength:  5/5 AT, GSC, PT, and peroneals  Stability:  Not tested  Tests:  No pertinent positive or negative tests  Neurovascular:  Sensation intact in DP/SP/Auguste/Sa/T nerve distributions   Sensation intact in all digital nerve distributions  Toes warm and perfused  Gait:  Not tested  Lab Results  (I have personally reviewed pertinent lab results )  Results from last 7 days   Lab Units 01/18/21  0521 01/17/21  0506 01/16/21  0300 01/15/21  0230 01/14/21  1501   WBC Thousand/uL 7 22 7 38 9 06 8 16 15 68*   HEMOGLOBIN g/dL 9 8* 9 4* 10 3* 11 4* 12 5   HEMATOCRIT % 28 4* 27 3* 31 2* 33 2* 36 7   PLATELETS Thousands/uL 187 149 144* 193 207         Results from last 7 days   Lab Units 01/18/21  0521 01/17/21  1752 01/17/21  0506 01/16/21  0300 01/15/21  0230 01/14/21  1501   POTASSIUM mmol/L 3 9 4 1 4 2 4 4 3 3* 3 7   CHLORIDE mmol/L 102 97* 96* 99* 97* 101   CO2 mmol/L 28 27 24 26 27 28   BUN mg/dL 8 8 10 16 9 10   CREATININE mg/dL 0 54* 0 71 0 61 1 10 0 62 0 62   EGFR ml/min/1 73sq m 101 91 97 54 96 96   CALCIUM mg/dL 8 2* 7 8* 7 9* 7 9* 8 3 8 8   ALK PHOS U/L  --   --   --   --  73  --    ALT U/L  --   --   --   --  21  --    AST U/L  --   --   --   --  17  --          Results from last 7 days   Lab Units 01/14/21  2347 01/14/21  2335   BLOOD CULTURE  No Growth at 72 hrs  No Growth at 72 hrs         Gaby Kerr PA-C

## 2021-01-18 NOTE — OCCUPATIONAL THERAPY NOTE
Occupational Therapy Evaluation(time=0815-0845)     Patient Name: Juan C Webb  [id number]'[id number] Date: 1/18/2021  Problem List  Principal Problem:    Femur fracture, right ([facility] )  Active Problems:    Schizophrenia ([facility] )    [facility] disorder ([facility] )    Fever    Hypokalemia    Hyponatremia    Past Medical History  Past Medical History:   Diagnosis Date    Schizophrenia [facility])      Past Surgical History  Past Surgical History:   Procedure Laterality Date    BRAIN SURGERY               01/18/21 0845   Note Type   Note type Evaluation   Restrictions/Precautions   Weight Bearing Precautions Per Order No   RLE Weight Bearing Per Order WBAT   Other Precautions Pain;THR;Cognitive; Chair Alarm; Bed Alarm;WBS   Pain Assessment   Pain Assessment Tool FLACC   Pain Rating: FLACC (Rest) - Face 0   Pain Rating: FLACC (Rest) - Legs 0   Pain Rating: FLACC (Rest) - Activity 0   Pain Rating: FLACC (Rest) - Cry 1   Pain Rating: FLACC (Rest) - Consolability 0   Score: FLACC (Rest) 1   Home Living   Type of [address] [place] [address] One level   Home Equipment   (denies)   Prior Function   Lives With Family  (sister)   ADL Assistance Independent   Falls in the last 6 months 1 to 4  (1)   Lifestyle   Autonomy PTA pt states independence with all aspects of her ADLs, transfers, ambulation--w/o device; +falls=1, neg home alone, neg    Reciprocal Relationships supportive sister   Service to Others states no employment   Intrinsic Gratification none stated   Psychosocial   Psychosocial (WDL) X   Patient Behaviors/Mood Flat affect; Cooperative   Subjective   Subjective "I'm paralyzed from the waist down :   ADL   Where Assessed Edge of bed   Eating Assistance 5  Supervision/Setup   Grooming Assistance 5  Supervision/Setup   UB Bathing Assistance 4  Minimal Assistance   LB Bathing Assistance 3  Moderate Assistance   UB Dressing Assistance 4  Minimal Assistance   LB Dressing Assistance 2  Maximal Assistance   Bed Mobility   Rolling R 3  Moderate assistance   Additional items Assist x 1; Increased time required;Verbal cues;LE management   Rolling L 3  Moderate assistance   Additional items Assist x 1; Increased time required;Verbal cues;LE management   Supine to Sit 3  Moderate assistance   Additional items Assist x 1; Increased time required;Verbal cues;LE management   Transfers   Sit to Stand 2  Maximal assistance   Additional items Assist x 1; Increased time required;Verbal cues   Stand to Sit 2  Maximal assistance   Additional items Assist x 1; Increased time required;Verbal cues   Additional Comments bp's=150/87(EOB), SPO2=97% on RA, HR=98bpm   Functional Mobility   Functional Mobility 2  Maximal assistance   Additional Comments x1   Additional items Rolling walker   Balance   Static Sitting Fair +   Dynamic Sitting Fair   Static Standing Poor +   Dynamic Standing Poor   Activity Tolerance   Activity Tolerance Patient limited by fatigue;Patient limited by pain   RUE Assessment   RUE Assessment WFL   RUE Strength   RUE Overall Strength Within Functional Limits - able to perform ADL tasks with strength  (4/5 throughout)   LUE Assessment   LUE Assessment WFL   LUE Strength   LUE Overall Strength Within Functional Limits - able to perform ADL tasks with strength  (4/5 throughout)   Hand Function   Gross Motor Coordination Functional   Fine Motor Coordination Functional   Sensation   Light Touch No apparent deficits   Proprioception   Proprioception No apparent deficits   Vision-Basic Assessment   Current Vision Wears glasses all the time   Vision - Complex Assessment   Acuity Able to read clock/calendar on wall without difficulty   Perception   Inattention/Neglect Appears intact   Cognition   Overall Cognitive Status Impaired   Arousal/Participation Alert   Attention Attends with cues to redirect   Orientation Level Oriented to person;Oriented to time   Memory Decreased short term memory;Decreased recall of recent events;Decreased recall of precautions Following Commands Follows one step commands with increased time or repetition   Comments hx cognitive impairment   Assessment   Limitation Decreased ADL status; Decreased UE strength;Decreased Safe judgement during ADL;Decreased cognition;Decreased endurance;Decreased high-level ADLs   Prognosis Fair   Assessment Pt is a 64y/o female admitted to the hospital after tripping over her dog, resulting in a R femur fx  Pt required a s/p R THR(1/15)  Pt is currently allowed WBAT with her R LE, having THP's  Pt with PMH schizophrenia, seizures, cognitive impairment, and brain sx  PTA pt states independence with all aspects of her ADLs, transfers, ambulation--w/o device; +falls=1, neg home alone, neg   During initial eval, pt demonstrated deficits with her functional balance, functional mobility, ADL status, transfer safety, b/l UE strength, activity tolerance(currently fair=15-20mins), and cognition(i e memory, problem-solving, judgement/safety)  Pt would benefit from continued OT tx for the above deficits  3-5xwk/1-2wks  Goals   Patient Goals unable to fully assess 2* cognition   STG Time Frame   (1-7 days)   Short Term Goal #1 Pt will demonstrate improved activity tolerance to good(20-30mins) and standing tolerance to 3-5mins to assist with ADLs  Short Term Goal #2 Pt will independently demonstrate knowledge and application of proper THP's 100% of the time  Short Term Goal  Pt will demonstrate proper walker/transfer safety 100% of the time  LTG Time Frame   (7-14 days)   Long Term Goal #1 Pt will demonstrate supervision with her UE and Peggy with her LE bathing/dressing  Long Term Goal #2 Pt will demonstrate improved functional balance by 1 grade to assist with ADLs  Long Term Goal Pt will demonstrate supervision with her sit-stand transfer to assist with her ADLs  Plan   Treatment Interventions ADL retraining;Functional transfer training;UE strengthening/ROM; Endurance training;Cognitive reorientation;Patient/family training;Equipment evaluation/education; Compensatory technique education;Continued evaluation   Goal Expiration Date 02/01/21   OT Treatment Day 0   OT Frequency 3-5x/wk   Recommendation   OT Discharge Recommendation Post-Acute Rehabilitation Services   Barthel Index   Feeding 10   Bathing 0   Grooming Score 5   Dressing Score 5   Bladder Score 0   Bowels Score 5   Toilet Use Score 5   Transfers (Bed/Chair) Score 5   Mobility (Level Surface) Score 0   Stairs Score 0   Barthel Index Score 35   Sonia Ken, OT

## 2021-01-18 NOTE — PROGRESS NOTES
Progress Note Mary Alice Rubin 1955, 63 y o  female MRN: [id number]    Unit/Bed#: E2 -24 Encounter: [id number]    Primary Care Provider: Carmencita Gordon DO   Date and time admitted to hospital: 1/14/2021  1:19 PM        * Femur fracture, right [facility])  Assessment & Plan  This is a 12year-old female with history of schizophrenia and seizure disorder presented after suffering a mechanical fall at home after she tripped over a dog  · Status post ORIF  · Will need rehab placement    Hyponatremia  Assessment & Plan      Results from last 7 days   Lab Units 01/18/21  0521 01/17/21  1752 01/17/21  0506 01/16/21  0300 01/15/21  0230 01/14/21  1501   SODIUM mmol/L 134* 128* 126* 130* 132* 135*       · Likely secondary to carbamazepine use and ADH release from pain   · Continue fluid restriction    Seizure disorder (HCC)  Assessment & Plan  · Reported history of seizure continue carbamazepine    Schizophrenia ([facility] )  Assessment & Plan  · Schizophrenia mood stable on zyprexa        Transition of Care Discharge Summary - [facility] Internal Medicine    Patient Information: Cynthia Jarquin 63 y o  female MRN: [id number]  Unit/Bed#: E2 -01 Encounter: [id number]    Discharging Physician / Practitioner: Shiraz Ta MD  PCP: Carmencita Gordon DO  Admission Date: 1/14/2021  Discharge Date: 01/18/21    Disposition:      [address] [place] [address]      Reason for Admission:  Fall    Discharge Diagnoses:     Principal Problem:    Femur fracture, right ([facility] )  Active Problems:    Schizophrenia ([facility] )    Seizure disorder [facility] )    Fever    Hypokalemia    Hyponatremia  Resolved Problems:    * No resolved hospital problems   *      Consultations During Hospital Stay:  · IP CONSULT TO ORTHOPEDIC SURGERY  · IP CONSULT TO PHYSICAL MEDICINE REHAB  · IP CONSULT TO CASE MANAGEMENT  · IP CONSULT TO PSYCHIATRY      Procedures Performed:     · ORIF    Medication Adjustments and Discharge Medications:  · Medication Dosing Tapers - Please refer to Discharge Medication List for details on any medication dosing tapers (if applicable to patient)  · Discharge Medication List: See after visit summary for reconciled discharge medications  Wound Care Recommendations:  When applicable, please see wound care section of After Visit Summary  Diet Recommendations at Discharge:  Diet -        Diet Orders   (From admission, onward)             Start     Ordered    01/17/21 1720  Diet Regular; Regular House; Fluid Restriction 1200 ML  Diet effective now     Question Answer Comment   Diet Type Regular    Regular Regular House    Other Restriction(s): Fluid Restriction 1200 ML    RD to adjust diet per protocol? Yes        01/17/21 1721              Fluid Restriction - Continue Fluid Restriction as Listed Above at Discharge  Significant Findings / Test Results:     · X-ray revealed right femoral neck fracture    Hospital Course:     Kimmy Lafleur is a 63 y o  female patient who originally presented to the hospital on 1/14/2021 due to mechanical fall after tripping over a dog  Has history of schizophrenia, seizure disorder, suffered a right femoral neck fracture  Orthopedic consulted and patient underwent ORIF  PT recommending rehab  Patient is otherwise medically stable for discharge to rehab today  Updated patient's sister via telephone  Please see above problem list for further details        Condition at Discharge: good     Discharge Day Visit / Exam:     Subjective:  Patient seen examined at bedside, denies any complaints    Vitals: Blood Pressure: 166/85 (01/18/21 0708)  Pulse: 90 (01/18/21 0708)  Temperature: 100 °F (37 8 °C) (01/18/21 0708)  Temp Source: Temporal (01/18/21 0708)  Respirations: 18 (01/18/21 0708)  Height: 5' 5" (165 1 cm) (01/15/21 0900)  Weight - Scale: 80 8 kg (178 lb 2 1 oz) (01/18/21 0534)  SpO2: 95 % (01/18/21 0708)    Physical Exam:    Constitutional: Patient is in no acute distress  HEENT: Normocephalic, atraumatic  Cardiovascular: Normal S1S2, RRR, No murmurs/rubs/gallops appreciated  Pulmonary:  Bilateral air entry, No rhonchi/rales/wheezing appreciated  Abdominal: Soft, Bowel sounds present, Non-tender, Non-distended  Extremities:  No cyanosis, clubbing or edema  Neurological: Cranial nerves II-XII grossly intact, sensation intact, otherwise no focal neurological symptoms  Discharge instructions/Information to patient and family:   See after visit summary section titled Discharge Instructions for information provided to patient and family  Planned Readmission: no  Discharge Statement:  I spent 35 minutes discharging the patient  This time was spent on the day of discharge  I had direct contact with the patient on the day of discharge  Greater than 50% of the total time was spent examining patient, answering all patient questions, arranging and discussing plan of care with patient as well as directly providing post-discharge instructions  Additional time then spent on discharge activities      ** Please Note: This note has been constructed using a voice recognition system **

## 2021-01-18 NOTE — ASSESSMENT & PLAN NOTE
Results from last 7 days   Lab Units 01/18/21  0521 01/17/21  1752 01/17/21  0506 01/16/21  0300 01/15/21  0230 01/14/21  1501   SODIUM mmol/L 134* 128* 126* 130* 132* 135*       · Likely secondary to carbamazepine use and ADH release from pain   · Continue fluid restriction

## 2021-01-18 NOTE — PLAN OF CARE
Problem: OCCUPATIONAL THERAPY ADULT  Goal: Performs self-care activities at highest level of function for planned discharge setting  See evaluation for individualized goals  Description: Treatment Interventions: ADL retraining, Functional transfer training, UE strengthening/ROM, Endurance training, Cognitive reorientation, Patient/family training, Equipment evaluation/education, Compensatory technique education, Continued evaluation          See flowsheet documentation for full assessment, interventions and recommendations  Note: Limitation: Decreased ADL status, Decreased UE strength, Decreased Safe judgement during ADL, Decreased cognition, Decreased endurance, Decreased high-level ADLs  Prognosis: Fair  Assessment: Pt is a 66y/o female admitted to the hospital after tripping over her dog, resulting in a R femur fx  Pt required a s/p R THR(1/15)  Pt is currently allowed WBAT with her R LE, having THP's  Pt with PMH schizophrenia, seizures, cognitive impairment, and brain sx  PTA pt states independence with all aspects of her ADLs, transfers, ambulation--w/o device; +falls=1, neg home alone, neg   During initial eval, pt demonstrated deficits with her functional balance, functional mobility, ADL status, transfer safety, b/l UE strength, activity tolerance(currently fair=15-20mins), and cognition(i e memory, problem-solving, judgement/safety)  Pt would benefit from continued OT tx for the above deficits  3-5xwk/1-2wks        OT Discharge Recommendation: Post-Acute Rehabilitation Services

## 2021-01-18 NOTE — CASE MANAGEMENT
Pt has been accepted at [facility] for STR  [facility] has arranged a BLS transport for 3PM   CM called pt's sister to make her aware of discharge  She asked that [facility] reaches out to her to go over dropping over clothing  [facility] liaison will be reaching out shortly       Report: [phone] Fax: [phone] Room: [facility]

## 2021-01-18 NOTE — PLAN OF CARE
Problem: PHYSICAL THERAPY ADULT  Goal: Performs mobility at highest level of function for planned discharge setting  See evaluation for individualized goals  Description: Treatment/Interventions: Functional transfer training, LE strengthening/ROM, Elevations, Therapeutic exercise, Endurance training, Patient/family training, Equipment eval/education, Bed mobility, Compensatory technique education, Gait training, Continued evaluation, Spoke to nursing  Equipment Recommended: Olga Alejandro       See flowsheet documentation for full assessment, interventions and recommendations  Note: Prognosis: Fair  Problem List: Decreased strength, Decreased range of motion, Decreased endurance, Impaired balance, Decreased mobility, Decreased coordination, Decreased cognition, Impaired judgement, Decreased safety awareness, Impaired hearing, Decreased skin integrity, Orthopedic restrictions, Pain  Assessment: pt seen for PT treatment  pt currently in bedside chair  pt needed mod assist of 2 (max assist 1) for sit to/from stand   pt better able to bring weight forward  pt continues to have poor weight support ble  pt was able to improve knee extension with cueing  pt tolerate distance of 5'  pt needed assist to advance rw and steer  pt tends to internally rotate and flex rle at the hip  noted to have slightly hypertonic adductors but may be due to response to stretch  pt returned to bed  needing max assist of 2  pt also performed ther ex ble  needing assist ble for proper performance and support  poor quadriceps activation on either side  pt also has mild klnee flexoin contractures ble, pt also worked on low load prolonged stretch to reach neutral rotation and pillow placed for abductoin  pt needs frequent cueing for adherence to task  pt was able to recall 1 thp after 1 minute and no thp's after 3 minutes  pt will need continued PT and rehab          PT Discharge Recommendation: Post-Acute Rehabilitation Services     PT - OK to Discharge: Yes(rehab)    See flowsheet documentation for full assessment

## 2021-01-19 ENCOUNTER — TRANSITIONAL CARE MANAGEMENT (OUTPATIENT)
Dept: FAMILY MEDICINE CLINIC | Facility: CLINIC | Age: 66
End: 2021-01-19

## 2021-01-20 ENCOUNTER — PATIENT OUTREACH (OUTPATIENT)
Dept: CASE MANAGEMENT | Facility: OTHER | Age: 66
End: 2021-01-20

## 2021-01-20 LAB
BACTERIA BLD CULT: NORMAL
BACTERIA BLD CULT: NORMAL

## 2021-01-27 ENCOUNTER — PATIENT OUTREACH (OUTPATIENT)
Dept: CASE MANAGEMENT | Facility: OTHER | Age: 66
End: 2021-01-27

## 2021-01-27 NOTE — PROGRESS NOTES
This CM assistant reached out to Nir Gonzalez at [facility] in regards to the patient  [place] confirmed the patient is still admitted at their facility  [place] could not provide me any more information  This care manager assistant will continue to monitor via chart review throughout bundle episode

## 2021-02-03 ENCOUNTER — PATIENT OUTREACH (OUTPATIENT)
Dept: CASE MANAGEMENT | Facility: OTHER | Age: 66
End: 2021-02-03

## 2021-02-03 ENCOUNTER — PATIENT OUTREACH (OUTPATIENT)
Dept: FAMILY MEDICINE CLINIC | Facility: CLINIC | Age: 66
End: 2021-02-03

## 2021-02-03 NOTE — PROGRESS NOTES
Contacted patient and spoke to sister Michelle Dennis to f/u on recent hospitalization and rehab stay  Bettina Arnold lives with her and she assists as needed, denied needing additional help at home  Bettina Arnold is ambulatory with walker and will be starting outpatient PT  She denies pain or discomfort to R hip  She does have short term memory issues so Michelle Dennis manages her medications  She is taking all as prescribed  Nutritional intake adequate  Follow up appointments scheduled, no transportation issues  I explained the bundle program but Michelle Dennis declined participation

## 2021-02-03 NOTE — PROGRESS NOTES
Spoke with Medical Records staff member who reports patient discharged 1/29/21  Letter drafted and emailed to to medical records records requesting copy of discharge paperwork  Letter attached to this encounter  Paperwork to be attached to encounter when received  I have removed myself off of the care team, added the CM to the care team who will follow the patient through the bundle episode, sent the care manager a inbasket notifying them of the bundle episode, updated the BPCI form, and updated the care coordination note

## 2021-02-04 ENCOUNTER — TELEPHONE (OUTPATIENT)
Dept: OBGYN CLINIC | Facility: HOSPITAL | Age: 66
End: 2021-02-04

## 2021-02-04 NOTE — TELEPHONE ENCOUNTER
Dr Ann-Marie Holt    Patient's ride may have to bring her 5year old guccighter with them if school is cancelled  Would this be ok?     [place] # [phone] or [phone]

## 2021-02-05 ENCOUNTER — OFFICE VISIT (OUTPATIENT)
Dept: OBGYN CLINIC | Facility: MEDICAL CENTER | Age: 66
End: 2021-02-05

## 2021-02-05 VITALS — HEIGHT: 65 IN | BODY MASS INDEX: 29.66 KG/M2 | WEIGHT: 178 LBS

## 2021-02-05 DIAGNOSIS — Z96.641 HISTORY OF RIGHT HIP HEMIARTHROPLASTY: Primary | ICD-10-CM

## 2021-02-05 PROCEDURE — 99024 POSTOP FOLLOW-UP VISIT: CPT | Performed by: ORTHOPAEDIC SURGERY

## 2021-02-05 NOTE — PROGRESS NOTES
Orthopaedic Surgery - Office Note  Yolanda Crawford (77 y o  female)   : 1955   MRN: [id number]  Encounter Date: 2021    Chief Complaint   Patient presents with    Right Hip - Post-op       Assessment / Plan  S/P right hip hemiarthroplasty 1/15/2021    · Weightbear as tolerated with walker  · Continue outpatient physical therapy  · Continue aspirin, Tylenol for pain  · XRAY at next visit  Return in about 6 weeks (around 3/19/2021)  History of Present Illness  Yolanda Crawford is a 63 y o  female who presents for her first postoperative visit following right hip hemiarthroplasty  Patient is with her sister today  She states that she is only having pain when she sleeps on the right side at night otherwise she has been relatively pain-free  Patient states that she fell out of bed 4 days postop  [facility] performed x-rays and patient's sister states that these were negative for any acute injury  She does take Tylenol as needed  She denies any numbness or tingling  She has been ambulating with a walker and feels comfortable doing so  She was released home from the [facility] 1 week ago and has initiated outpatient physical therapy  She is taking aspirin at this time  Review of Systems  Pertinent items are noted in HPI  All other systems were reviewed and are negative  Physical Exam  Ht 5' 5" (1 651 m)   Wt 80 7 kg (178 lb)   BMI 29 62 kg/m²   Cons: Appears well  No apparent distress  Psych: Alert  Oriented x3  Mood and affect normal   Eyes: PERRLA, EOMI  Resp: Normal effort  No audible wheezing or stridor  CV: Palpable pulse  No discernable arrhythmia  No LE edema  Lymph:  No palpable cervical, axillary, or inguinal lymphadenopathy  Skin: Warm  No palpable masses  No visible lesions  Neuro: Normal muscle tone  Normal and symmetric DTR's  Right Hip Exam  Alignment / Posture:  Normal resting hip posture  Inspection:  No swelling  No erythema   Incision clean and dry   Palpation:  No tenderness  ROM:  Hip Flexion 90  Hip ER 30  Hip IR 20  Strength:  Hip Flexors 4/5  Hip Abductors 4/5  Able to extend knee against gravity  Stability:  Not tested  Tests:  No pertinent positive or negative tests  Neurovascular:  Sensation intact in DP/SP/Auguste/Sa/T nerve distributions  2+ DP & PT pulses  Brisk capillary refill in all toes  Toes warm and perfused  Gait:  Steady with walker  Studies Reviewed  No studies to review    Procedures  No procedures today  Medical, Surgical, Family, and Social History  The patient's medical history, family history, and social history, were reviewed and updated as appropriate  Past Medical History:   Diagnosis Date    History of drug overdose 2005    Memory loss     Schizophrenia [facility])        Past Surgical History:   Procedure Laterality Date    BRAIN SURGERY      HIP ARTHROPLASTY Right 1/15/2021    Procedure: HemiARTHROPLASTY HIP bipolar;  Surgeon: Diogenes Blank MD;  Location: [facility];  Service: Orthopedics    OR PARTIAL HIP REPLACEMENT Right 1/15/2021    Procedure: HEMIARTHROPLASTY HIP (BIPOLAR);   Surgeon: Diogenes Blank MD;  [facility];  Service: Orthopedics       Family History   Problem Relation Age of Onset    Other Mother         Cardiac Arrest     Other Father         cardiac Arrest        Social History     Occupational History    Not on file   Tobacco Use    Smoking status: Never Smoker    Smokeless tobacco: Never Used    Tobacco comment: onset date: 8/2/2017    Substance and Sexual Activity    Alcohol use: Not Currently    Drug use: Never    Sexual activity: Not on file       Allergies   Allergen Reactions    Penicillins          Current Outpatient Medications:     carBAMazepine (TEGretol) 200 mg tablet, Take 400 mg by mouth 2 (two) times a day, Disp: , Rfl:     docusate sodium (COLACE) 100 mg capsule, Take 1 capsule (100 mg total) by mouth 2 (two) times a day, Disp: 10 capsule, Rfl: 0   enoxaparin (LOVENOX) 40 mg/0 4 mL, Inject 0 4 mL (40 mg total) under the skin daily, Disp: 12 mL, Rfl: 0    OLANZapine (ZYPREXA) 20 MG tablet, Take 40 mg by mouth daily at bedtime , Disp: , Rfl:     pantoprazole (PROTONIX) 40 mg tablet, Take 1 tablet (40 mg total) by mouth daily before breakfast, Disp: 30 tablet, Rfl: 0      Kassidy Nieto MA    Scribe Attestation    I,:  Kassidy Nieto MA am acting as a scribe while in the presence of the attending physician :       I,:  Anamaria Craig MD personally performed the services described in this documentation    as scribed in my presence :

## 2021-03-04 ENCOUNTER — OFFICE VISIT (OUTPATIENT)
Dept: FAMILY MEDICINE CLINIC | Facility: CLINIC | Age: 66
End: 2021-03-04
Payer: MEDICARE

## 2021-03-04 VITALS
BODY MASS INDEX: 23.59 KG/M2 | WEIGHT: 141.6 LBS | TEMPERATURE: 98.3 F | HEIGHT: 65 IN | SYSTOLIC BLOOD PRESSURE: 120 MMHG | DIASTOLIC BLOOD PRESSURE: 80 MMHG

## 2021-03-04 DIAGNOSIS — S72.8X1A OTHER CLOSED FRACTURE OF RIGHT FEMUR, UNSPECIFIED PORTION OF FEMUR, INITIAL ENCOUNTER (HCC): ICD-10-CM

## 2021-03-04 DIAGNOSIS — Z00.00 MEDICARE ANNUAL WELLNESS VISIT, SUBSEQUENT: ICD-10-CM

## 2021-03-04 DIAGNOSIS — I10 ESSENTIAL HYPERTENSION: Primary | ICD-10-CM

## 2021-03-04 DIAGNOSIS — F25.0 SCHIZOAFFECTIVE DISORDER, BIPOLAR TYPE (HCC): ICD-10-CM

## 2021-03-04 PROCEDURE — 99214 OFFICE O/P EST MOD 30 MIN: CPT | Performed by: FAMILY MEDICINE

## 2021-03-04 PROCEDURE — G0439 PPPS, SUBSEQ VISIT: HCPCS | Performed by: FAMILY MEDICINE

## 2021-03-04 RX ORDER — AMLODIPINE BESYLATE 5 MG/1
5 TABLET ORAL DAILY
Qty: 90 TABLET | Refills: 1 | Status: SHIPPED | OUTPATIENT
Start: 2021-03-04 | End: 2021-08-28

## 2021-03-04 RX ORDER — ASPIRIN 81 MG/1
81 TABLET ORAL DAILY
COMMUNITY
End: 2022-06-21

## 2021-03-04 RX ORDER — SENNOSIDES 8.6 MG
1 TABLET ORAL 2 TIMES DAILY
COMMUNITY
Start: 2021-01-28 | End: 2022-06-21

## 2021-03-04 RX ORDER — AMLODIPINE BESYLATE 5 MG/1
5 TABLET ORAL DAILY
COMMUNITY
Start: 2021-01-28 | End: 2021-03-04 | Stop reason: SDUPTHER

## 2021-03-04 NOTE — PROGRESS NOTES
Assessment/Plan:  She is doing well  Continue current therapy  She will continue home physical therapy  Will follow-up here for blood pressure check in 6 months sooner if needed  Diagnoses and all orders for this visit:    Essential hypertension  -     amLODIPine (NORVASC) 5 mg tablet; Take 1 tablet (5 mg total) by mouth daily    Other closed fracture of right femur, unspecified portion of femur, initial encounter ([facility] )    Schizoaffective disorder, bipolar type ([place] [address] )    Medicare annual wellness visit, subsequent    Other orders  -     Discontinue: ASPIRIN PO; 81 mg  -     Discontinue: amLODIPine (NORVASC) 5 mg tablet; Take 5 mg by mouth daily  -     [facility] 6 MG tablet; Take 1 tablet by mouth 2 (two) times a day  -     aspirin (ECOTRIN LOW STRENGTH) 81 mg EC tablet; Take 81 mg by mouth daily            Subjective:        Patient ID: Neda Delaney is a 63 y o  female  Patient presents with: Follow-up: follow up for hip surgery/rehab     [facility] after having hip replacement after a fall at home  She is doing very well  She was started on amlodipine for blood pressure  The following portions of the patient's history were reviewed and updated as appropriate: allergies, current medications, past family history, past medical history, past social history, past surgical history and problem list       Review of Systems   Constitutional: Negative  HENT: Negative  Eyes: Negative  Respiratory: Negative  Cardiovascular: Negative  Gastrointestinal: Negative  Endocrine: Negative  Genitourinary: Negative  Musculoskeletal: Positive for arthralgias  Skin: Negative  Allergic/Immunologic: Negative  Neurological: Negative  Hematological: Negative  Psychiatric/Behavioral: Negative  All other systems reviewed and are negative            Objective:             /80   Temp 98 3 °F (36 8 °C)   Ht 5' 5" (1 651 m)   Wt 64 2 kg (141 lb 9 6 oz)   BMI 23 56 kg/m²          Physical Exam  Vitals signs and nursing note reviewed  Constitutional:       Appearance: She is well-developed  HENT:      Head: Normocephalic and atraumatic  Right Ear: External ear normal       Left Ear: External ear normal       Nose: Nose normal    Eyes:      Conjunctiva/sclera: Conjunctivae normal       Pupils: Pupils are equal, round, and reactive to light  Neck:      Musculoskeletal: Normal range of motion and neck supple  Cardiovascular:      Rate and Rhythm: Normal rate and regular rhythm  Heart sounds: Normal heart sounds  Pulmonary:      Effort: Pulmonary effort is normal       Breath sounds: Normal breath sounds  Abdominal:      General: Bowel sounds are normal       Palpations: Abdomen is soft  Musculoskeletal:         General: No swelling or tenderness  Skin:     General: Skin is warm and dry  Neurological:      Mental Status: She is alert and oriented to person, place, and time  Deep Tendon Reflexes: Reflexes are normal and symmetric     Psychiatric:         Behavior: Behavior normal

## 2021-03-04 NOTE — PROGRESS NOTES
Assessment and Plan:     Problem List Items Addressed This Visit        Cardiovascular and Mediastinum    Hypertension - Primary    Relevant Medications    amLODIPine (NORVASC) 5 mg tablet       Musculoskeletal and Integument    Femur fracture, right (HCC)       Other    Schizoaffective disorder ([facility] )      Other Visit Diagnoses     Medicare annual wellness visit, subsequent               Preventive health issues were discussed with patient, and age appropriate screening tests were ordered as noted in patient's After Visit Summary  Personalized health advice and appropriate referrals for health education or preventive services given if needed, as noted in patient's After Visit Summary  History of Present Illness:     Patient presents for Medicare Annual Wellness visit    Patient Care Team:  Malorie Georges DO as PCP - General (Family Medicine)  Gladys Tenorio RN as RN Care Manager (Care Coordination)     Problem List:     Patient Active Problem List   Diagnosis    Acute arthritis    Hypertension    Impaired fasting glucose    Schizoaffective disorder (HCC)    Increased frequency of urination    [facility]    Femur fracture, right (HCC)    Pre-op evaluation    Fever    Hypokalemia    Hyponatremia      Past Medical and Surgical History:     Past Medical History:   Diagnosis Date    History of drug overdose 2005    Memory loss     Schizophrenia [facility])      Past Surgical History:   Procedure Laterality Date    BRAIN SURGERY      HIP ARTHROPLASTY Right 1/15/2021    Procedure: HemiARTHROPLASTY HIP bipolar;  Surgeon: Lucero Anand MD;  [facility];  Service: Orthopedics    MA PARTIAL HIP REPLACEMENT Right 1/15/2021    Procedure: HEMIARTHROPLASTY HIP (BIPOLAR);   Surgeon: Lucero Anand MD;  [facility];  Service: Orthopedics      Family History:     Family History   Problem Relation Age of Onset    Other Mother         Cardiac Arrest     Other Father         cardiac Arrest       Social History:        Social History     Socioeconomic History    Marital status: Single     Spouse name: None    Number of children: None    Years of education: None    Highest education level: None   Occupational History    None   Social Needs    Financial resource strain: None    Food insecurity     Worry: None     Inability: None    Transportation needs     Medical: None     Non-medical: None   Tobacco Use    Smoking status: Never Smoker    Smokeless tobacco: Never Used    Tobacco comment: onset date: 8/2/2017    Substance and Sexual Activity    Alcohol use: Not Currently    Drug use: Never    Sexual activity: None   Lifestyle    Physical activity     Days per week: None     Minutes per session: None    Stress: None   Relationships    Social connections     Talks on phone: None     Gets together: None     Attends Tenriism service: None     Active member of club or organization: None     Attends meetings of clubs or organizations: None     Relationship status: None    Intimate partner violence     Fear of current or ex partner: None     Emotionally abused: None     Physically abused: None     Forced sexual activity: None   Other Topics Concern    None   Social History Narrative    None      Medications and Allergies:     Current Outpatient Medications   Medication Sig Dispense Refill    amLODIPine (NORVASC) 5 mg tablet Take 1 tablet (5 mg total) by mouth daily 90 tablet 1    aspirin (ECOTRIN LOW STRENGTH) 81 mg EC tablet Take 81 mg by mouth daily      carBAMazepine (TEGretol) 200 mg tablet Take 400 mg by mouth 2 (two) times a day      [facility] 6 MG tablet Take 1 tablet by mouth 2 (two) times a day      OLANZapine (ZYPREXA) 20 MG tablet Take 40 mg by mouth daily at bedtime       docusate sodium (COLACE) 100 mg capsule Take 1 capsule (100 mg total) by mouth 2 (two) times a day (Patient not taking: Reported on 3/4/2021) 10 capsule 0    enoxaparin (LOVENOX) 40 mg/0 4 mL Inject 0 4 mL (40 mg total) under the skin daily (Patient not taking: Reported on 3/4/2021) 12 mL 0    pantoprazole (PROTONIX) 40 mg tablet Take 1 tablet (40 mg total) by mouth daily before breakfast (Patient not taking: Reported on 3/4/2021) 30 tablet 0     No current facility-administered medications for this visit  Allergies   Allergen Reactions    Penicillins       Immunizations:     Immunization History   Administered Date(s) Administered    INFLUENZA 10/22/2008    Influenza Quadrivalent Preservative Free 3 years and older IM 11/05/2014      Health Maintenance:         Topic Date Due    MAMMOGRAM  1955    Cervical Cancer Screening  12/13/1978    Colorectal Cancer Screening  12/13/2007    HIV Screening  Discontinued    Hepatitis C Screening  Discontinued         Topic Date Due    Influenza Vaccine (1) 09/01/2020      Medicare Health Risk Assessment:     /80   Temp 98 3 °F (36 8 °C)   Ht 5' 5" (1 651 m)   Wt 64 2 kg (141 lb 9 6 oz)   BMI 23 56 kg/m²      Sheryl is here for her Initial Wellness and Subsequent Wellness visit  Health Risk Assessment:   Patient rates overall health as good  Patient feels that their physical health rating is same  Eyesight was rated as same  Hearing was rated as same  Patient feels that their emotional and mental health rating is same  Pain experienced in the last 7 days has been none  Patient states that she has experienced no weight loss or gain in last 6 months  Depression Screening:   PHQ-2 Score: 0      Fall Risk Screening: In the past year, patient has experienced: history of falling in past year    Number of falls: 2 or more  Injured during fall?: Yes    Feels unsteady when standing or walking?: Yes    Worried about falling?: Yes      Urinary Incontinence Screening:   Patient has not leaked urine accidently in the last six months  Home Safety:  Patient does not have trouble with stairs inside or outside of their home   Patient has working smoke alarms and has working carbon monoxide detector  Home safety hazards include: none  Nutrition:   Current diet is Regular  Medications:   Patient is currently taking over-the-counter supplements  OTC medications include: see medication list  Patient is not able to manage medications  Activities of Daily Living (ADLs)/Instrumental Activities of Daily Living (IADLs):   Walk and transfer into and out of bed and chair?: Yes  Dress and groom yourself?: No    Bathe or shower yourself?: Yes    Feed yourself? Yes  Do your laundry/housekeeping?: No  Manage your money, pay your bills and track your expenses?: Yes  Make your own meals?: No    Do your own shopping?: No    Previous Hospitalizations:   Any hospitalizations or ED visits within the last 12 months?: Yes    How many hospitalizations have you had in the last year?: 1-2    Advance Care Planning:   Living will: No    Durable POA for healthcare:  Yes    Advanced directive: No      PREVENTIVE SCREENINGS      Cardiovascular Screening:    General: Screening Current      Diabetes Screening:     General: Screening Current      Colorectal Cancer Screening:     General: Screening Current      Osteoporosis Screening:    General: Risks and Benefits Discussed      Abdominal Aortic Aneurysm (AAA) Screening:        General: Risks and Benefits Discussed      Lung Cancer Screening:     General: Screening Not Indicated      Hepatitis C Screening:    General: Risks and Benefits Discussed      Igor Condon DO

## 2021-03-23 ENCOUNTER — TELEPHONE (OUTPATIENT)
Dept: OBGYN CLINIC | Facility: MEDICAL CENTER | Age: 66
End: 2021-03-23

## 2021-03-23 NOTE — TELEPHONE ENCOUNTER
Spoke with patient sister in regards scheduling a follow up appointment with Dr Gris Metz  Sister stated that Jagdeep Crane is doing good and she does not want to schedule a follow up

## 2021-04-19 ENCOUNTER — PATIENT OUTREACH (OUTPATIENT)
Dept: FAMILY MEDICINE CLINIC | Facility: CLINIC | Age: 66
End: 2021-04-19

## 2021-04-22 ENCOUNTER — TELEPHONE (OUTPATIENT)
Dept: OBGYN CLINIC | Facility: MEDICAL CENTER | Age: 66
End: 2021-04-22

## 2021-04-22 NOTE — TELEPHONE ENCOUNTER
I called to follow up with the patient regarding her right hip hemiarthroplasty which was done on 1/15/21  She was seen 2 weeks postoperatively and has since missed her followup appointments  I spoke with her sister Walter Bland  She stated that the patient is feeling well  She does not have any hip pain  She is ambulating with a walker  She is basically back to her baseline level of mobility  Overall she feels the hip is doing well  They prefer to follow up only as needed

## 2021-08-28 DIAGNOSIS — I10 ESSENTIAL HYPERTENSION: ICD-10-CM

## 2021-08-28 RX ORDER — AMLODIPINE BESYLATE 5 MG/1
TABLET ORAL
Qty: 90 TABLET | Refills: 1 | Status: SHIPPED | OUTPATIENT
Start: 2021-08-28 | End: 2021-09-09 | Stop reason: SDUPTHER

## 2021-09-09 ENCOUNTER — OFFICE VISIT (OUTPATIENT)
Dept: FAMILY MEDICINE CLINIC | Facility: CLINIC | Age: 66
End: 2021-09-09
Payer: MEDICARE

## 2021-09-09 DIAGNOSIS — F25.0 SCHIZOAFFECTIVE DISORDER, BIPOLAR TYPE (HCC): ICD-10-CM

## 2021-09-09 DIAGNOSIS — I10 ESSENTIAL HYPERTENSION: Primary | ICD-10-CM

## 2021-09-09 DIAGNOSIS — Z12.31 ENCOUNTER FOR SCREENING MAMMOGRAM FOR MALIGNANT NEOPLASM OF BREAST: ICD-10-CM

## 2021-09-09 DIAGNOSIS — Z12.11 SCREENING FOR COLON CANCER: ICD-10-CM

## 2021-09-09 PROCEDURE — 99214 OFFICE O/P EST MOD 30 MIN: CPT | Performed by: FAMILY MEDICINE

## 2021-09-09 RX ORDER — AMLODIPINE BESYLATE 5 MG/1
5 TABLET ORAL DAILY
Qty: 90 TABLET | Refills: 3 | Status: SHIPPED | OUTPATIENT
Start: 2021-09-09 | End: 2022-06-21 | Stop reason: SDUPTHER

## 2021-09-09 NOTE — PROGRESS NOTES
Assessment/Plan:  Continue current therapy  She will follow-up with her psychiatrist   I will call with the lab results  I discussed immunizations with her sister both the flu and COVID vaccines but they are not interested in vaccines at this time  Diagnoses and all orders for this visit:    Essential hypertension  -     amLODIPine (NORVASC) 5 mg tablet; Take 1 tablet (5 mg total) by mouth daily  -     Lipid panel  -     Comprehensive metabolic panel    Screening for colon cancer  -     Ambulatory referral for colonoscopy; Future    Encounter for screening mammogram for malignant neoplasm of breast  -     Mammo screening bilateral w 3d & cad; Future    Schizoaffective disorder, bipolar type ([facility] )            Subjective:        Patient ID: Rene Ovalles is a 63 y o  female  Patient presents with: Follow-up: 6 month f/u            The following portions of the patient's history were reviewed and updated as appropriate: allergies, current medications, past family history, past medical history, past social history, past surgical history and problem list       Review of Systems   Constitutional: Negative  HENT: Negative  Eyes: Negative  Respiratory: Negative  Cardiovascular: Negative  Gastrointestinal: Negative  Endocrine: Negative  Genitourinary: Negative  Musculoskeletal: Positive for gait problem and myalgias  Skin: Negative  Allergic/Immunologic: Negative  Hematological: Negative  Psychiatric/Behavioral: Negative  All other systems reviewed and are negative  Objective: There were no vitals taken for this visit  Physical Exam  Vitals and nursing note reviewed  Constitutional:       Appearance: She is well-developed  HENT:      Head: Normocephalic and atraumatic        Right Ear: External ear normal       Left Ear: External ear normal       Nose: Nose normal    Eyes:      Conjunctiva/sclera: Conjunctivae normal       Pupils: Pupils are equal, round, and reactive to light  Cardiovascular:      Rate and Rhythm: Normal rate and regular rhythm  Heart sounds: Normal heart sounds  Pulmonary:      Effort: Pulmonary effort is normal       Breath sounds: Normal breath sounds  Abdominal:      General: Bowel sounds are normal       Palpations: Abdomen is soft  Musculoskeletal:      Cervical back: Normal range of motion and neck supple  Skin:     General: Skin is warm and dry  Neurological:      Mental Status: She is alert and oriented to person, place, and time  Deep Tendon Reflexes: Reflexes are normal and symmetric     Psychiatric:         Behavior: Behavior normal

## 2022-05-26 ENCOUNTER — RA CDI HCC (OUTPATIENT)
Dept: OTHER | Facility: HOSPITAL | Age: 67
End: 2022-05-26

## 2022-05-26 NOTE — PROGRESS NOTES
[facility] [address]  coding opportunities       Chart reviewed, no opportunity found: CHART REVIEWED, NO OPPORTUNITY FOUND        Patients Insurance     Medicare Insurance: Medicare

## 2022-06-21 ENCOUNTER — OFFICE VISIT (OUTPATIENT)
Dept: FAMILY MEDICINE CLINIC | Facility: CLINIC | Age: 67
End: 2022-06-21
Payer: MEDICARE

## 2022-06-21 VITALS
RESPIRATION RATE: 18 BRPM | SYSTOLIC BLOOD PRESSURE: 120 MMHG | HEIGHT: 65 IN | HEART RATE: 100 BPM | DIASTOLIC BLOOD PRESSURE: 70 MMHG | BODY MASS INDEX: 22.16 KG/M2 | OXYGEN SATURATION: 98 % | WEIGHT: 133 LBS | TEMPERATURE: 97 F

## 2022-06-21 DIAGNOSIS — I10 ESSENTIAL HYPERTENSION: ICD-10-CM

## 2022-06-21 DIAGNOSIS — F25.0 SCHIZOAFFECTIVE DISORDER, BIPOLAR TYPE (HCC): ICD-10-CM

## 2022-06-21 DIAGNOSIS — Z12.11 COLON CANCER SCREENING: ICD-10-CM

## 2022-06-21 DIAGNOSIS — Z00.00 MEDICARE ANNUAL WELLNESS VISIT, SUBSEQUENT: Primary | ICD-10-CM

## 2022-06-21 DIAGNOSIS — G40.309 NONINTRACTABLE GENERALIZED IDIOPATHIC EPILEPSY WITHOUT STATUS EPILEPTICUS (HCC): ICD-10-CM

## 2022-06-21 PROCEDURE — G0438 PPPS, INITIAL VISIT: HCPCS | Performed by: FAMILY MEDICINE

## 2022-06-21 PROCEDURE — 1123F ACP DISCUSS/DSCN MKR DOCD: CPT | Performed by: FAMILY MEDICINE

## 2022-06-21 PROCEDURE — 99214 OFFICE O/P EST MOD 30 MIN: CPT | Performed by: FAMILY MEDICINE

## 2022-06-21 RX ORDER — AMLODIPINE BESYLATE 5 MG/1
5 TABLET ORAL DAILY
Qty: 90 TABLET | Refills: 3 | Status: SHIPPED | OUTPATIENT
Start: 2022-06-21

## 2022-06-21 NOTE — PROGRESS NOTES
Assessment and Plan:     Problem List Items Addressed This Visit        Other    Sch[facility]ffective disorder ([facility] )      Other Visit Diagnoses     Medicare annual wellness visit, subsequent    -  Primary    Relevant Orders    Lipid panel    Comprehensive metabolic panel    CBC and differential    TSH, 3rd generation with Free T4 reflex    Colon cancer screening        Relevant Orders    Cologuard    Essential hypertension        Relevant Medications    amLODIPine (NORVASC) 5 mg tablet    Other Relevant Orders    Lipid panel    Comprehensive metabolic panel    CBC and differential    TSH, 3rd generation with Free T4 reflex    Nonintractable generalized idiopathic epilepsy without status epilepticus ([facility] )              Depression Screening and Follow-up Plan: Patient's depression screening was positive with a PHQ-2 score of 6  Their PHQ-9 score was 18  Clincally patient does not have depression  No treatment is required  Preventive health issues were discussed with patient, and age appropriate screening tests were ordered as noted in patient's After Visit Summary  Personalized health advice and appropriate referrals for health education or preventive services given if needed, as noted in patient's After Visit Summary  History of Present Illness:     Patient presents for a Medicare Wellness Visit    Lakeshia Soteol is in today with her sister for regular checkup and annual well visit  Patient Care Team:  Jyoti Alonzo DO as PCP - General (Family Medicine)     Review of Systems:     Review of Systems   Constitutional: Negative  HENT: Negative  Eyes: Negative  Respiratory: Negative  Cardiovascular: Negative  Gastrointestinal: Negative  Endocrine: Negative  Genitourinary: Negative  Musculoskeletal: Positive for arthralgias, back pain and gait problem  Skin: Negative  Allergic/Immunologic: Negative  Hematological: Negative  Psychiatric/Behavioral: The patient is nervous/anxious  All other systems reviewed and are negative  Problem List:     Patient Active Problem List   Diagnosis    Acute arthritis    Hypertension    Impaired fasting glucose    [facility]ive disorder ([facility] )    Increased frequency of urination    Seizure disorder ([facility] )    Femur fracture, right (HCC)    Pre-op evaluation    Fever    Hypokalemia    Hyponatremia      Past Medical and Surgical History:     Past Medical History:   Diagnosis Date    History of drug overdose 2005    Hypertension     Memory loss     Schizophrenia [facility])      Past Surgical History:   Procedure Laterality Date    BRAIN SURGERY      HIP ARTHROPLASTY Right 1/15/2021    Procedure: HemiARTHROPLASTY HIP bipolar;  Surgeon: Eddie Sanchez MD;  [facility];  Service: Orthopedics    PA PARTIAL HIP REPLACEMENT Right 1/15/2021    Procedure: HEMIARTHROPLASTY HIP (BIPOLAR);   Surgeon: Eddie Sanchez MD;  [facility];  Service: Orthopedics      Family History:     Family History   Problem Relation Age of Onset    Other Mother         Cardiac Arrest     Heart disease Mother     Other Father         cardiac Arrest     Heart disease Father       Social History:     Social History     Socioeconomic History    Marital status: Single     Spouse name: None    Number of children: None    Years of education: None    Highest education level: None   Occupational History    Occupation: disabled   Tobacco Use    Smoking status: Never Smoker    Smokeless tobacco: Never Used    Tobacco comment: onset date: 8/2/2017    Vaping Use    Vaping Use: Never used   Substance and Sexual Activity    Alcohol use: Not Currently    Drug use: Never    Sexual activity: Not Currently     Partners: Male   Other Topics Concern    None   Social History Narrative    None     Social Determinants of Health     Financial Resource Strain: Not on file   Food Insecurity: Not on file   Transportation Needs: Not on file   Physical Activity: Not on file   Stress: Not on file   Social Connections: Not on file   Intimate Partner Violence: Not on file   Housing Stability: Not on file      Medications and Allergies:     Current Outpatient Medications   Medication Sig Dispense Refill    amLODIPine (NORVASC) 5 mg tablet Take 1 tablet (5 mg total) by mouth daily 90 tablet 3    carBAMazepine (TEGretol) 200 mg tablet Take 400 mg by mouth 2 (two) times a day      OLANZapine (ZyPREXA) 20 MG tablet Take 40 mg by mouth daily at bedtime        No current facility-administered medications for this visit  Allergies   Allergen Reactions    Penicillins       Immunizations:     Immunization History   Administered Date(s) Administered    INFLUENZA 10/22/2008    Influenza Quadrivalent Preservative Free 3 years and older IM 11/05/2014      Health Maintenance:         Topic Date Due    Cervical Cancer Screening  Never done    Breast Cancer Screening: Mammogram  Never done    Colorectal Cancer Screening  Never done    HIV Screening  Discontinued    Hepatitis C Screening  Discontinued         Topic Date Due    COVID-19 Vaccine (1) Never done    Influenza Vaccine (Season Ended) 09/01/2022      Medicare Screening Tests and Risk Assessments:     Zuleika Ferreira is here for her Subsequent Wellness visit  Health Risk Assessment:   Patient rates overall health as fair  Patient feels that their physical health rating is same  Patient is satisfied with their life  Eyesight was rated as same  Hearing was rated as same  Patient feels that their emotional and mental health rating is same  Patients states they are sometimes angry  Patient states they are sometimes unusually tired/fatigued  Pain experienced in the last 7 days has been some  Patient's pain rating has been 7/10  Patient states that she has experienced no weight loss or gain in last 6 months  Depression Screening:   PHQ-2 Score: 6  PHQ-9 Score: 18      Fall Risk Screening:    In the past year, patient has experienced: history of falling in past year    Number of falls: 1  Injured during fall?: No    Feels unsteady when standing or walking?: Yes    Worried about falling?: Yes      Urinary Incontinence Screening:   Patient has not leaked urine accidently in the last six months  Home Safety:  Patient does not have trouble with stairs inside or outside of their home  Patient has working smoke alarms and has working carbon monoxide detector  Home safety hazards include: none  Nutrition:   Current diet is Regular  Medications:   Patient is currently taking over-the-counter supplements  OTC medications include: see medication list  Patient is not able to manage medications  Activities of Daily Living (ADLs)/Instrumental Activities of Daily Living (IADLs):   Walk and transfer into and out of bed and chair?: Yes  Dress and groom yourself?: Yes    Bathe or shower yourself?: Yes    Feed yourself?  Yes  Do your laundry/housekeeping?: No  Manage your money, pay your bills and track your expenses?: No  Make your own meals?: No    Do your own shopping?: No    Previous Hospitalizations:   Any hospitalizations or ED visits within the last 12 months?: No      Advance Care Planning:   Living will: No    Durable POA for healthcare: No    Advanced directive: No      PREVENTIVE SCREENINGS      Cardiovascular Screening:    General: Screening Current      Diabetes Screening:     General: Screening Current      Colorectal Cancer Screening:     General: Screening Current      Osteoporosis Screening:    General: Risks and Benefits Discussed      Abdominal Aortic Aneurysm (AAA) Screening:        General: Risks and Benefits Discussed      Lung Cancer Screening:     General: Screening Not Indicated      Hepatitis C Screening:    General: Risks and Benefits Discussed    No exam data present     Physical Exam:     /70 (BP Location: Right arm, Patient Position: Sitting, Cuff Size: Adult)   Pulse 100   Temp (!) 97 °F (36 1 °C) (Tympanic)   Resp 18   Ht 5' 5" (1 651 m)   Wt 60 3 kg (133 lb)   SpO2 98%   BMI 22 13 kg/m²     Physical Exam  Vitals and nursing note reviewed  Exam conducted with a chaperone present  Constitutional:       Appearance: She is well-developed  HENT:      Head: Normocephalic  Eyes:      Pupils: Pupils are equal, round, and reactive to light  Cardiovascular:      Rate and Rhythm: Normal rate and regular rhythm  Heart sounds: Normal heart sounds  Pulmonary:      Effort: Pulmonary effort is normal       Breath sounds: Normal breath sounds  Abdominal:      General: Bowel sounds are normal       Palpations: Abdomen is soft  Musculoskeletal:      Cervical back: Normal range of motion and neck supple  Skin:     General: Skin is warm and dry  Capillary Refill: Capillary refill takes less than 2 seconds  Neurological:      General: No focal deficit present  Mental Status: She is alert and oriented to person, place, and time            Brittney Antoine DO

## 2022-07-07 LAB — COLOGUARD RESULT REPORTABLE: NEGATIVE

## 2022-10-12 PROBLEM — R50.9 FEVER: Status: RESOLVED | Noted: 2021-01-15 | Resolved: 2022-10-12

## 2022-12-06 NOTE — ASSESSMENT & PLAN NOTE
· Reported history of seizure continue carbamazepine Topical Retinoid counseling:  Patient advised to apply a pea-sized amount only at bedtime and wait 30 minutes after washing their face before applying.  If too drying, patient may add a non-comedogenic moisturizer. The patient verbalized understanding of the proper use and possible adverse effects of retinoids.  All of the patient's questions and concerns were addressed.

## 2022-12-15 ENCOUNTER — RA CDI HCC (OUTPATIENT)
Dept: OTHER | Facility: HOSPITAL | Age: 67
End: 2022-12-15

## 2023-01-25 NOTE — PHYSICAL THERAPY NOTE
Physical Therapy Progress Note     01/18/21 0948   PT Last Visit   PT Visit Date 01/18/21   Note Type   Note Type Treatment   Pain Assessment   Pain Assessment Tool [facility] Pain Intervention(s) Repositioned; Ambulation/increased activity; Emotional support   Pain Rating: FLACC (Rest) - Face 0   Pain Rating: FLACC (Rest) - Legs 0   Pain Rating: FLACC (Rest) - Activity 0   Pain Rating: FLACC (Rest) - Cry 0   Pain Rating: FLACC (Rest) - Consolability 0   Score: FLACC (Rest) 0   Pain Rating: FLACC (Activity) - Face 1   Pain Rating: FLACC (Activity) - Legs 0   Pain Rating: FLACC (Activity) - Activity 0   Pain Rating: FLACC (Activity) - Cry 1   Pain Rating: FLACC (Activity) - Consolability 0   Score: FLACC (Activity) 2   Precautions   Total Hip Replacement ADduction; Internal rotation; Flexion   Restrictions/Precautions   Weight Bearing Precautions Per Order No   RLE Weight Bearing Per Order WBAT   Other Precautions Cognitive; Chair Alarm; Bed Alarm;WBS;THR;Multiple lines; Fall Risk;Pain   General   Chart Reviewed Yes   Response to Previous Treatment Patient with no complaints from previous session  Family/Caregiver Present No   Cognition   Overall Cognitive Status Impaired   Orientation Level Oriented X4   Subjective   Subjective i am paralysed   Bed Mobility   Sit to Supine 2  Maximal assistance   Additional items Assist x 2; Increased time required;Verbal cues;LE management   Additional Comments pt tends to adduct and internally rotate rle  adductors hyperactive, ? pain reaction   Transfers   Sit to Stand 2  Maximal assistance   Additional items Assist x 1; Armrests; Increased time required; Impulsive;Verbal cues   Stand to Sit 2  Maximal assistance   Additional items Assist x 1;Bedrails; Increased time required; Impulsive;Verbal cues   Ambulation/Elevation   Gait pattern Poor UE support; Improper Weight shift;Narrow CARMEN; Decreased foot clearance;Decreased R stance;Decreased L stance; Inconsistent yordan; Short stride; Step to;Excessively slow   Gait Assistance 3  Moderate assist   Additional items Assist x 2;Verbal cues; Tactile cues  (assist to advance rw )   Assistive Device Rolling walker   Distance 5'   Balance   Static Sitting Fair +   Dynamic Sitting Fair   Static Standing Poor +   Dynamic Standing Poor   Ambulatory Poor   Endurance Deficit   Endurance Deficit Yes   Endurance Deficit Description weakness, fatigue   Activity Tolerance   Activity Tolerance Patient tolerated treatment well;Patient limited by pain   Medical Staff Made Aware OT   Nurse Made Aware yes   Exercises   THR Supine;10 reps;AAROM; Bilateral  (adductor stretch, hip rotation from IR to neutral)   Assessment   Prognosis Fair   Problem List Decreased strength;Decreased range of motion;Decreased endurance; Impaired balance;Decreased mobility; Decreased coordination;Decreased cognition; Impaired judgement;Decreased safety awareness; Impaired hearing;Decreased skin integrity;Orthopedic restrictions;Pain   Assessment pt seen for PT treatment  pt currently in bedside chair  pt needed mod assist of 2 (max assist 1) for sit to/from stand   pt better able to bring weight forward  pt continues to have poor weight support ble  pt was able to improve knee extension with cueing  pt tolerate distance of 5'  pt needed assist to advance rw and steer  pt tends to internally rotate and flex rle at the hip  noted to have slightly hypertonic adductors but may be due to response to stretch  pt returned to bed  needing max assist of 2  pt also performed ther ex ble  needing assist ble for proper performance and support  poor quadriceps activation on either side  pt also has mild klnee flexoin contractures ble, pt also worked on low load prolonged stretch to reach neutral rotation and pillow placed for abductoin  pt needs frequent cueing for adherence to task  pt was able to recall 1 thp after 1 minute and no thp's after 3 minutes  pt will need continued PT and rehab     Goals Patient Goals none reported   STG Expiration Date 01/26/21   PT Treatment Day 2   Plan   Treatment/Interventions Functional transfer training;LE strengthening/ROM; Therapeutic exercise; Endurance training;Cognitive reorientation;Patient/family training;Equipment eval/education; Bed mobility;Gait training; Compensatory technique education;Spoke to nursing;OT;Spoke to case management   Progress Slow progress, cognitive deficits   PT Frequency 5x/wk;7x/wk   Recommendation   PT Discharge Recommendation Post-Acute Rehabilitation Services   PT - OK to Discharge Yes  (rehab)   Kwadwo 8 in Bed Without Bedrails 2   Lying on Back to Sitting on Edge of Flat Bed 2   Moving Bed to Chair 2   Standing Up From Chair 2   Walk in Room 1   Climb 3-5 Stairs 1   Basic Mobility Inpatient Raw Score 10   Turning Head Towards Sound 4   Follow Simple Instructions 3   Low Function Basic Mobility Raw Score 17   Low Function Basic Mobility Standardized Score 27 46     Juan Ely, PT Show Ultrasound In Note?: Yes

## 2023-08-08 ENCOUNTER — OFFICE VISIT (OUTPATIENT)
Dept: FAMILY MEDICINE CLINIC | Facility: CLINIC | Age: 68
End: 2023-08-08
Payer: MEDICARE

## 2023-08-08 VITALS
DIASTOLIC BLOOD PRESSURE: 76 MMHG | SYSTOLIC BLOOD PRESSURE: 100 MMHG | WEIGHT: 120.8 LBS | HEIGHT: 63 IN | HEART RATE: 102 BPM | OXYGEN SATURATION: 97 % | TEMPERATURE: 98.7 F | BODY MASS INDEX: 21.4 KG/M2

## 2023-08-08 DIAGNOSIS — F25.0 SCHIZOAFFECTIVE DISORDER, BIPOLAR TYPE (HCC): ICD-10-CM

## 2023-08-08 DIAGNOSIS — I10 PRIMARY HYPERTENSION: ICD-10-CM

## 2023-08-08 DIAGNOSIS — I10 ESSENTIAL HYPERTENSION: ICD-10-CM

## 2023-08-08 DIAGNOSIS — Z00.00 MEDICARE ANNUAL WELLNESS VISIT, SUBSEQUENT: Primary | ICD-10-CM

## 2023-08-08 DIAGNOSIS — R59.0 LYMPHADENOPATHY, PREAURICULAR: ICD-10-CM

## 2023-08-08 DIAGNOSIS — R73.01 IMPAIRED FASTING GLUCOSE: ICD-10-CM

## 2023-08-08 DIAGNOSIS — G40.909 SEIZURE DISORDER (HCC): ICD-10-CM

## 2023-08-08 PROCEDURE — G0439 PPPS, SUBSEQ VISIT: HCPCS | Performed by: FAMILY MEDICINE

## 2023-08-08 PROCEDURE — 99214 OFFICE O/P EST MOD 30 MIN: CPT | Performed by: FAMILY MEDICINE

## 2023-08-08 RX ORDER — AMLODIPINE BESYLATE 5 MG/1
5 TABLET ORAL DAILY
Qty: 90 TABLET | Refills: 3 | Status: SHIPPED | OUTPATIENT
Start: 2023-08-08

## 2023-08-08 NOTE — PROGRESS NOTES
Assessment and Plan:     Problem List Items Addressed This Visit        Endocrine    Impaired fasting glucose    Relevant Orders    Comprehensive metabolic panel       Cardiovascular and Mediastinum    Hypertension    Relevant Medications    amLODIPine (NORVASC) 5 mg tablet       Nervous and Auditory    Seizure disorder (HCC)       Other    Schizoaffective disorder (HCC)   Other Visit Diagnoses     Medicare annual wellness visit, subsequent    -  Primary    Relevant Orders    Lipid panel    Comprehensive metabolic panel    CBC and differential    TSH, 3rd generation with Free T4 reflex    Essential hypertension        Relevant Medications    amLODIPine (NORVASC) 5 mg tablet    Other Relevant Orders    Lipid panel    Comprehensive metabolic panel    CBC and differential    TSH, 3rd generation with Free T4 reflex    Lymphadenopathy, preauricular        Relevant Orders    Ambulatory Referral to Otolaryngology      We do note the mass on the left side of her face. 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 Tissue sampling suggested to confirm diagnosis."  Recommend she follow-up with ENT for possible biopsy. Depression Screening and Follow-up Plan: Patient was screened for depression during today's encounter. They screened negative with a PHQ-2 score of 0. Falls Plan of Care: balance, strength, and gait training instructions were provided. Preventive health issues were discussed with patient, and age appropriate screening tests were ordered as noted in patient's After Visit Summary. Personalized health advice and appropriate referrals for health education or preventive services given if needed, as noted in patient's After Visit Summary.      History of Present Illness:     Patient presents for a Medicare Wellness Visit    Patient presents with:  Medicare Wellness Visit: Medicare wellness, and discuss spots on arms. Patient would like to go over cologuard results, never received. No further  concerns, ng         Patient Care Team:  Praveena Barraza DO as PCP - General (Family Medicine)     Review of Systems:     Review of Systems   Constitutional: Negative. HENT: Negative. Respiratory: Negative. Cardiovascular: Negative. Gastrointestinal: Negative. Musculoskeletal: Positive for arthralgias and gait problem. Skin: Positive for rash. Several lesions on both arms. Problem List:     Patient Active Problem List   Diagnosis   • Acute arthritis   • Hypertension   • Impaired fasting glucose   • Schizoaffective disorder (HCC)   • Increased frequency of urination   • Seizure disorder (HCC)   • Femur fracture, right (HCC)   • Hypokalemia   • Hyponatremia      Past Medical and Surgical History:     Past Medical History:   Diagnosis Date   • History of drug overdose 2005   • Hypertension    • Memory loss    • Schizophrenia [facility])      Past Surgical History:   Procedure Laterality Date   • BRAIN SURGERY     • HIP ARTHROPLASTY Right 1/15/2021    Procedure: HemiARTHROPLASTY HIP bipolar;  Surgeon: Yoandy Mendoza MD;  Location: [facility];  Service: Orthopedics   • [place] PARTIAL HIP REPLACEMENT Right 1/15/2021    Procedure: HEMIARTHROPLASTY HIP (BIPOLAR);   Surgeon: Yoandy Mendoza MD;  Location: [facility];  Service: Orthopedics      Family History:     Family History   Problem Relation Age of Onset   • Other Mother         Cardiac Arrest    • Heart disease Mother    • Other Father         cardiac Arrest    • Heart disease Father       Social History:     Social History     Socioeconomic History   • Marital status: Single     Spouse name: Not on file   • Number of children: Not on file   • Years of education: Not on file   • Highest education level: Not on file   Occupational History   • Occupation: disabled   Tobacco Use   • Smoking status: Never   • Smokeless tobacco: Never   • Tobacco comments:     onset date: 8/2/2017    Vaping Use   • Vaping Use: Never used   Substance and Sexual Activity   • Alcohol use: Not Currently   • Drug use: Never   • Sexual activity: Not Currently     Partners: Male   Other Topics Concern   • Not on file   Social History Narrative   • Not on file     Social Determinants of Health     Financial Resource Strain: Low Risk  (8/8/2023)    Overall Financial Resource Strain (CARDIA)    • Difficulty of Paying Living Expenses: Not hard at all   Food Insecurity: Not on file   Transportation Needs: No Transportation Needs (8/8/2023)    PRAPARE - Transportation    • Lack of Transportation (Medical): No    • Lack of Transportation (Non-Medical): No   Physical Activity: Not on file   Stress: Not on file   Social Connections: Not on file   Intimate Partner Violence: Not on file   Housing Stability: Not on file      Medications and Allergies:     Current Outpatient Medications   Medication Sig Dispense Refill   • amLODIPine (NORVASC) 5 mg tablet Take 1 tablet (5 mg total) by mouth daily 90 tablet 3   • carBAMazepine (TEGretol) 200 mg tablet Take 400 mg by mouth 2 (two) times a day     • OLANZapine (ZyPREXA) 20 MG tablet Take 40 mg by mouth daily at bedtime        No current facility-administered medications for this visit.      Allergies   Allergen Reactions   • Penicillins       Immunizations:     Immunization History   Administered Date(s) Administered   • INFLUENZA 10/22/2008   • Influenza Quadrivalent Preservative Free 3 years and older IM 11/05/2014      Health Maintenance:         Topic Date Due   • Breast Cancer Screening: Mammogram  Never done   • Colorectal Cancer Screening  06/29/2025   • HIV Screening  Discontinued   • Hepatitis C Screening  Discontinued         Topic Date Due   • COVID-19 Vaccine (1) Never done   • Pneumococcal Vaccine: 65+ Years (1 - PCV) Never done   • Influenza Vaccine (1) 09/01/2023      Medicare Screening Tests and Risk Assessments:     Ysabel Paredes is here for her Subsequent Wellness visit. Health Risk Assessment:   Patient rates overall health as fair. Patient feels that their physical health rating is slightly worse. Patient is satisfied with their life. Eyesight was rated as same. Hearing was rated as same. Patient feels that their emotional and mental health rating is slightly worse. Patient states they are never, rarely unusually tired/fatigued. Pain experienced in the last 7 days has been some. Patient's pain rating has been 2/10. Patient states that she has experienced no weight loss or gain in last 6 months. Depression Screening:   PHQ-2 Score: 0      Fall Risk Screening: In the past year, patient has experienced: history of falling in past year    Number of falls: 2 or more  Injured during fall?: No    Feels unsteady when standing or walking?: Yes    Worried about falling?: Yes      Urinary Incontinence Screening:   Patient has not leaked urine accidently in the last six months. Home Safety:  Patient has trouble with stairs inside or outside of their home. Patient has working smoke alarms and has working carbon monoxide detector. Home safety hazards include: none. Nutrition:   Current diet is Regular. Medications:   Patient is currently taking over-the-counter supplements. OTC medications include: see medication list. Patient is not able to manage medications. Activities of Daily Living (ADLs)/Instrumental Activities of Daily Living (IADLs):   Walk and transfer into and out of bed and chair?: Yes  Dress and groom yourself?: Yes    Bathe or shower yourself?: Yes    Feed yourself?  Yes  Do your laundry/housekeeping?: No  Manage your money, pay your bills and track your expenses?: No  Make your own meals?: No    Do your own shopping?: No    ADL comments: Patient can do some thing with assistance    Previous Hospitalizations:   Any hospitalizations or ED visits within the last 12 months?: No      Advance Care Planning:   Living will: No    Durable POA for healthcare: Yes    Advanced directive: No      PREVENTIVE SCREENINGS      Cardiovascular Screening:    General: Screening Current      Diabetes Screening:     General: Screening Current      Colorectal Cancer Screening:     General: Screening Current      Cervical Cancer Screening:    General: Screening Not Indicated      Osteoporosis Screening:    General: Risks and Benefits Discussed      Abdominal Aortic Aneurysm (AAA) Screening:        General: Risks and Benefits Discussed      Lung Cancer Screening:     General: Screening Not Indicated      Hepatitis C Screening:    General: Risks and Benefits Discussed    No results found. Physical Exam:     /76 (BP Location: Right arm, Patient Position: Sitting, Cuff Size: Standard)   Pulse 102   Temp 98.7 °F (37.1 °C) (Tympanic)   Ht 5' 3" (1.6 m)   Wt 54.8 kg (120 lb 12.8 oz)   SpO2 97%   BMI 21.40 kg/m²     Physical Exam  Vitals and nursing note reviewed. Constitutional:       Appearance: She is well-developed. HENT:      Head: Normocephalic. Eyes:      Pupils: Pupils are equal, round, and reactive to light. Cardiovascular:      Rate and Rhythm: Normal rate and regular rhythm. Heart sounds: Normal heart sounds. Pulmonary:      Effort: Pulmonary effort is normal.      Breath sounds: Normal breath sounds. Abdominal:      General: Bowel sounds are normal.      Palpations: Abdomen is soft. Musculoskeletal:      Cervical back: Normal range of motion and neck supple. Skin:     General: Skin is warm and dry. Capillary Refill: Capillary refill takes less than 2 seconds. Neurological:      Mental Status: She is alert and oriented to person, place, and time.    Psychiatric:         Behavior: Behavior normal.          Jt Leal DO

## 2023-08-27 ENCOUNTER — HOSPITAL ENCOUNTER (OUTPATIENT)
Dept: CT IMAGING | Facility: HOSPITAL | Age: 68
Discharge: HOME | End: 2023-08-27
Payer: MEDICARE

## 2023-08-27 DIAGNOSIS — K11.8 PAROTID MASS: ICD-10-CM

## 2023-08-27 PROCEDURE — G1004 CDSM NDSC: HCPCS

## 2023-08-27 PROCEDURE — 70491 CT SOFT TISSUE NECK W/DYE: CPT

## 2023-08-27 RX ADMIN — IOHEXOL 85 ML: 350 INJECTION, SOLUTION INTRAVENOUS at 08:15

## 2023-09-05 NOTE — NURSING NOTE
Call placed to patient to discuss upcoming appointment at [address] [place] [address] radiology department and complete consultation with patient's caretaker [place]. Patient is having a left lymph node biopsy utilizing US  guidance. Reviewed patient's allergies, no current anticoagulant medication present, also discussed the pre and post procedure expectations. Reminded patient of location and time expected for procedure, Patient expressed understanding by verbalizing and repeating instructions.

## 2023-09-18 ENCOUNTER — HOSPITAL ENCOUNTER (OUTPATIENT)
Dept: RADIOLOGY | Facility: HOSPITAL | Age: 68
Discharge: HOME | End: 2023-09-18
Admitting: RADIOLOGY
Payer: MEDICARE

## 2023-09-18 DIAGNOSIS — K11.8 PAROTID MASS: ICD-10-CM

## 2023-09-18 PROCEDURE — 88342 IMHCHEM/IMCYTCHM 1ST ANTB: CPT | Performed by: STUDENT IN AN ORGANIZED HEALTH CARE EDUCATION/TRAINING PROGRAM

## 2023-09-18 PROCEDURE — 10005 FNA BX W/US GDN 1ST LES: CPT

## 2023-09-18 PROCEDURE — 88305 TISSUE EXAM BY PATHOLOGIST: CPT | Performed by: STUDENT IN AN ORGANIZED HEALTH CARE EDUCATION/TRAINING PROGRAM

## 2023-09-18 PROCEDURE — 88173 CYTOPATH EVAL FNA REPORT: CPT | Performed by: STUDENT IN AN ORGANIZED HEALTH CARE EDUCATION/TRAINING PROGRAM

## 2023-09-18 PROCEDURE — 88341 IMHCHEM/IMCYTCHM EA ADD ANTB: CPT | Performed by: STUDENT IN AN ORGANIZED HEALTH CARE EDUCATION/TRAINING PROGRAM

## 2023-09-18 RX ORDER — LIDOCAINE HYDROCHLORIDE 10 MG/ML
2 INJECTION, SOLUTION EPIDURAL; INFILTRATION; INTRACAUDAL; PERINEURAL ONCE
Status: COMPLETED | OUTPATIENT
Start: 2023-09-18 | End: 2023-09-18

## 2023-09-18 RX ADMIN — LIDOCAINE HYDROCHLORIDE 2 ML: 10 INJECTION, SOLUTION EPIDURAL; INFILTRATION; INTRACAUDAL; PERINEURAL at 09:29

## 2023-09-21 PROCEDURE — 88173 CYTOPATH EVAL FNA REPORT: CPT | Performed by: STUDENT IN AN ORGANIZED HEALTH CARE EDUCATION/TRAINING PROGRAM

## 2023-09-21 PROCEDURE — 88305 TISSUE EXAM BY PATHOLOGIST: CPT | Performed by: STUDENT IN AN ORGANIZED HEALTH CARE EDUCATION/TRAINING PROGRAM

## 2023-09-21 PROCEDURE — 88342 IMHCHEM/IMCYTCHM 1ST ANTB: CPT | Performed by: STUDENT IN AN ORGANIZED HEALTH CARE EDUCATION/TRAINING PROGRAM

## 2023-09-21 PROCEDURE — 88341 IMHCHEM/IMCYTCHM EA ADD ANTB: CPT | Performed by: STUDENT IN AN ORGANIZED HEALTH CARE EDUCATION/TRAINING PROGRAM

## 2023-10-18 ENCOUNTER — RA CDI HCC (OUTPATIENT)
Dept: OTHER | Facility: HOSPITAL | Age: 68
End: 2023-10-18

## 2023-10-24 ENCOUNTER — CONSULT (OUTPATIENT)
Dept: FAMILY MEDICINE CLINIC | Facility: CLINIC | Age: 68
End: 2023-10-24
Payer: MEDICARE

## 2023-10-24 VITALS
TEMPERATURE: 98.2 F | DIASTOLIC BLOOD PRESSURE: 88 MMHG | RESPIRATION RATE: 20 BRPM | BODY MASS INDEX: 21.09 KG/M2 | HEART RATE: 86 BPM | HEIGHT: 63 IN | SYSTOLIC BLOOD PRESSURE: 138 MMHG | WEIGHT: 119 LBS | OXYGEN SATURATION: 99 %

## 2023-10-24 DIAGNOSIS — F25.0 SCHIZOAFFECTIVE DISORDER, BIPOLAR TYPE (HCC): ICD-10-CM

## 2023-10-24 DIAGNOSIS — I10 PRIMARY HYPERTENSION: ICD-10-CM

## 2023-10-24 DIAGNOSIS — G40.909 SEIZURE DISORDER (HCC): ICD-10-CM

## 2023-10-24 DIAGNOSIS — Z01.818 PREOP EXAMINATION: Primary | ICD-10-CM

## 2023-10-24 PROCEDURE — 99215 OFFICE O/P EST HI 40 MIN: CPT | Performed by: FAMILY MEDICINE

## 2023-10-24 RX ORDER — OMEGA-3S/DHA/EPA/FISH OIL/D3 300MG-1000
400 CAPSULE ORAL DAILY
COMMUNITY

## 2023-10-24 RX ORDER — DIPHENOXYLATE HYDROCHLORIDE AND ATROPINE SULFATE 2.5; .025 MG/1; MG/1
1 TABLET ORAL DAILY
COMMUNITY

## 2023-10-24 NOTE — PROGRESS NOTES
Name: Balta West      : 1955      MRN: [id number]  Encounter Provider: Viviana Carrizales DO  Encounter Date: 10/24/2023   Encounter department: [address] [facility]     1. Preop examination  Comments:  Janes Elena is medically cleared for upcoming ENT surgery. 2. Primary hypertension    3. Seizure disorder ([address])    4. Schizoaffective disorder, bipolar type ([address])           Subjective      She presents today with her sister for preoperative exam for upcoming ENT surgery. Review of Systems   Constitutional: Negative. HENT: Negative. Mass on left side of face. Eyes: Negative. Respiratory: Negative. Cardiovascular: Negative. Gastrointestinal: Negative. Endocrine: Negative. Genitourinary: Negative. Musculoskeletal: Negative. Skin: Negative. Allergic/Immunologic: Negative. Neurological: Negative. Hematological: Negative. Psychiatric/Behavioral: Negative. All other systems reviewed and are negative. Current Outpatient Medications on File Prior to Visit   Medication Sig   • amLODIPine (NORVASC) 5 mg tablet Take 1 tablet (5 mg total) by mouth daily   • carBAMazepine (TEGretol) 200 mg tablet Take 400 mg by mouth 2 (two) times a day   • cholecalciferol (VITAMIN D3) 400 units tablet Take 400 Units by mouth daily   • multivitamin (THERAGRAN) TABS Take 1 tablet by mouth daily   • OLANZapine (ZyPREXA) 20 MG tablet Take 40 mg by mouth daily at bedtime        Objective     /88 (BP Location: Right arm, Patient Position: Sitting, Cuff Size: Standard)   Pulse 86   Temp 98.2 °F (36.8 °C) (Temporal)   Resp 20   Ht 5' 3" (1.6 m)   Wt 54 kg (119 lb)   SpO2 99%   BMI 21.08 kg/m²     Physical Exam  Vitals and nursing note reviewed. Constitutional:       Appearance: She is well-developed. HENT:      Head: Normocephalic and atraumatic. Eyes:      Pupils: Pupils are equal, round, and reactive to light.    Neck: Vascular: No JVD. Cardiovascular:      Rate and Rhythm: Normal rate and regular rhythm. Pulses: Normal pulses. Heart sounds: Normal heart sounds. Pulmonary:      Effort: Pulmonary effort is normal.      Breath sounds: Normal breath sounds. Abdominal:      General: Abdomen is flat. Palpations: Abdomen is soft. There is no mass. Musculoskeletal:      Cervical back: Normal range of motion and neck supple. Lymphadenopathy:      Cervical: No cervical adenopathy. Neurological:      General: No focal deficit present. Mental Status: She is alert and oriented to person, place, and time.        Michael Creed, DO

## 2023-11-21 ENCOUNTER — CONSULT (OUTPATIENT)
Dept: CARDIOLOGY CLINIC | Facility: CLINIC | Age: 68
End: 2023-11-21
Payer: MEDICARE

## 2023-11-21 VITALS
HEIGHT: 63 IN | DIASTOLIC BLOOD PRESSURE: 80 MMHG | SYSTOLIC BLOOD PRESSURE: 128 MMHG | WEIGHT: 119.6 LBS | BODY MASS INDEX: 21.19 KG/M2 | HEART RATE: 74 BPM

## 2023-11-21 DIAGNOSIS — Z01.810 PREOP CARDIOVASCULAR EXAM: Primary | ICD-10-CM

## 2023-11-21 DIAGNOSIS — M79.89 LEG SWELLING: ICD-10-CM

## 2023-11-21 DIAGNOSIS — K11.8 PAROTID MASS: ICD-10-CM

## 2023-11-21 PROCEDURE — 93000 ELECTROCARDIOGRAM COMPLETE: CPT | Performed by: INTERNAL MEDICINE

## 2023-11-21 PROCEDURE — 99204 OFFICE O/P NEW MOD 45 MIN: CPT | Performed by: INTERNAL MEDICINE

## 2023-11-21 NOTE — PATIENT INSTRUCTIONS
If leg swelling is still present talk to your doctor about stopping amlodipine and replacing it with another blood pressure medicine like lisinopril or hydrochlorothiazide.

## 2023-11-21 NOTE — PROGRESS NOTES
Outpatient Consultation - General Cardiology   Luis Guevara 74 y.o. female   MRN: [id number]  Encounter: [id number]      PCP: Marbin Osorio DO      History of Present Illness   Physician Requesting Consult: Consults   Reason for Consult / Principal Problem: pre-op eval.     HPI: Luis Guevara is a 74y.o. year old female with past medical history of hypertension, seizure disorder, schizoaffective disorder bipolar type, history of unintentional prescription drug overdose in 2017 with resultant memory issues who presents for medical clearance for upcoming ENT surgery. 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 This is a benign lesion but they have malignant potential (1.5%) and ENT recommended surgery. The patient was cleared for surgery by her primary care doctor and she went on 11/8/2023 for surgery. On that day the EKG from her PCP office was reviewed and was abnormal and it was decided to delay surgery until cardiac clearance was done. EKG    The patient is here today with her sister who she lives with. The patient today says she is feeling well. She denies any chest pain, shortness of breath, palpitations. She does have leg swelling which has been there for a long time now. She is not very active and per her sister is very happy to sit in a chair all day. She does get up and walk short distances mostly on flat ground and does so without any symptoms. She does not go up stairs often but does say she could do a flight of stairs. She has a few steps into her house which she can do easily. No personal history of smoking however she does have exposure to secondhand smoke. Review of Systems  Review of system was conducted and was negative except for as stated in the HPI.       Historical Information   Past Medical History:   Diagnosis Date    History of drug overdose 2005    Hypertension     Memory loss     Schizophrenia [facility])      Past Surgical History:   Procedure Laterality Date    BRAIN SURGERY      HIP ARTHROPLASTY Right 1/15/2021    Procedure: HemiARTHROPLASTY HIP bipolar;  Surgeon: Yesika Guillermo MD;  [facility];  Service: Orthopedics    ME HEMIARTHROPLASTY HIP PARTIAL Right 1/15/2021    Procedure: HEMIARTHROPLASTY HIP (BIPOLAR); Surgeon: Yesika Guillermo MD;  Location[facility];  Service: [address] BIOPSY LEFT  2023     Social History     Substance and Sexual Activity   Alcohol Use Not Currently     Social History     Substance and Sexual Activity   Drug Use Not Currently     Social History     Tobacco Use   Smoking Status Never   Smokeless Tobacco Never   Tobacco Comments    onset date: 2017      Family History:   Father  at 61 from [place]  Mother  at 61 from [place].     Meds/Allergies   Home Medications:   Current Outpatient Medications:     amLODIPine (NORVASC) 5 mg tablet, Take 1 tablet (5 mg total) by mouth daily, Disp: 90 tablet, Rfl: 3    carBAMazepine (TEGretol) 200 mg tablet, Take 400 mg by mouth 2 (two) times a day, Disp: , Rfl:     cholecalciferol (VITAMIN D3) 400 units tablet, Take 400 Units by mouth daily, Disp: , Rfl:     multivitamin (THERAGRAN) TABS, Take 1 tablet by mouth daily, Disp: , Rfl:     OLANZapine (ZyPREXA) 20 MG tablet, Take 40 mg by mouth daily at bedtime , Disp: , Rfl:     Allergies   Allergen Reactions    Penicillins Hives         Objective   Vitals: Blood pressure 158/92, pulse 74, height 5' 3" (1.6 m), weight 54.3 kg (119 lb 9.6 oz). Physical Exam  GEN: Yulia Marti appears well, alert and oriented x 3, pleasant and cooperative   HEENT:  Normocephalic, atraumatic, anicteric, moist mucous membranes  NECK: left parotid mass noted.    HEART: reg rhythm, reg rate, normal S1 and S2, no murmurs, clicks, gallops or rubs   LUNGS: Clear to auscultation bilaterally; no wheezes, rales, or rhonchi; respiration nonlabored ABDOMEN:  Normoactive bowel sounds, soft, no tenderness, no distention  EXTREMITIES: 1+ pitting edema bilaterally. NEURO: no gross focal findings; cranial nerves grossly intact   SKIN:  Dry, intact, warm to touch    Lab Results: I have personally reviewed pertinent lab results. No results found for: "HSTNI0", "HSTNI2", "HSTNI4", "HSTNI"  No results found for: "NTBNP"  No results found for: "CHOL", "TRIG", "HDL", "LDLCALC", "LDLDIRECT"  Lab Results   Component Value Date    K 3.9 01/18/2021    CO2 28 01/18/2021     01/18/2021    BUN 8 01/18/2021    CREATININE 0.54 (L) 01/18/2021    ALT 21 01/15/2021    AST 17 01/15/2021     Lab Results   Component Value Date    WBC 7.22 01/18/2021    HGB 9.8 (L) 01/18/2021    HCT 28.4 (L) 01/18/2021    MCV 94 01/18/2021     01/18/2021     No results found for: "INR"      Imaging: I have personally reviewed pertinent reports. EKG:   Date:  Normal sinus rhythm with early repolarization changes in 2, 3, aVF. Assessment/Plan   Evita Razo is a 74y.o. year old female with past medical history of hypertension, seizure disorder, schizoaffective disorder bipolar type, history of unintentional prescription drug overdose in 2017 with resultant memory issues who presents for medical clearance for upcoming ENT surgery. #pre-operative assessment  Preoperative assessment for left-sided parotid mass removal.  Patient was cleared by her PCP however on day of surgery noted to have ST elevations inferior leads on the EKG that was done at the PCPs office on 10/24 so the surgery was canceled. The patient does not have any history of CAD or MI. She denies any anginal symptoms. She is able to do 4 METS. Her EKG is abnormal with ST elevations in 2, 3, aVF which likely are due to early repolarization which is a benign finding. Given her history of hypertension and pedal edema will recommend echocardiogram prior to surgery.   As long as echocardiogram is without significant abnormality she will not need other testing prior to surgery. -ST changes are due to early re-polarization  -TTE ordered (to be done prior to surgery)    #leg swelling  Patient has chronic lower extremity edema. Pitting 1+ on exam.  No other symptoms of CHF. Could be side effect from amlodipine. -TTE  -pt will follow up with PCP after echo to determine if amlodipine can be transitioned to other antihypertensive. #HTN: BP 120s-130s on amlodipine 5mg daily.   -Continue amlodipine 5 mg daily. Case discussed and reviewed with Dr. Vincent Kemp who agrees with my assessment and plan. Thank you for involving us in the care of your patient.       Jaquan Castillo MD  Cardiology Fellow   PGY-5

## 2023-12-20 ENCOUNTER — HOSPITAL ENCOUNTER (INPATIENT)
Facility: HOSPITAL | Age: 68
LOS: 11 days | Discharge: NURSING, REHAB OR LONG-TERM CARE | DRG: 064 | End: 2023-12-31
Attending: EMERGENCY MEDICINE | Admitting: INTERNAL MEDICINE
Payer: MEDICARE

## 2023-12-20 ENCOUNTER — APPOINTMENT (EMERGENCY)
Dept: CT IMAGING | Facility: HOSPITAL | Age: 68
DRG: 064 | End: 2023-12-20
Payer: MEDICARE

## 2023-12-20 ENCOUNTER — APPOINTMENT (EMERGENCY)
Dept: RADIOLOGY | Facility: HOSPITAL | Age: 68
DRG: 064 | End: 2023-12-20
Payer: MEDICARE

## 2023-12-20 DIAGNOSIS — I63.9 STROKE (HCC): Primary | ICD-10-CM

## 2023-12-20 DIAGNOSIS — D49.0 PAROTID TUMOR: ICD-10-CM

## 2023-12-20 DIAGNOSIS — R93.0 ABNORMAL HEAD CT: ICD-10-CM

## 2023-12-20 DIAGNOSIS — U07.1 COVID: ICD-10-CM

## 2023-12-20 DIAGNOSIS — I10 HYPERTENSION: ICD-10-CM

## 2023-12-20 PROBLEM — Z87.898 HISTORY OF BRAIN TUMOR: Status: ACTIVE | Noted: 2023-12-20

## 2023-12-20 PROBLEM — R29.90 STROKE-LIKE SYMPTOMS: Status: ACTIVE | Noted: 2023-12-20

## 2023-12-20 LAB
2HR DELTA HS TROPONIN: 1 NG/L
4HR DELTA HS TROPONIN: 1 NG/L
ALBUMIN SERPL BCP-MCNC: 3.6 G/DL (ref 3.5–5)
ALP SERPL-CCNC: 85 U/L (ref 34–104)
ALT SERPL W P-5'-P-CCNC: 19 U/L (ref 7–52)
ANION GAP SERPL CALCULATED.3IONS-SCNC: 3 MMOL/L
AST SERPL W P-5'-P-CCNC: 20 U/L (ref 13–39)
ATRIAL RATE: 60 BPM
ATRIAL RATE: 71 BPM
ATRIAL RATE: 74 BPM
BASOPHILS # BLD AUTO: 0.05 THOUSANDS/ÂΜL (ref 0–0.1)
BASOPHILS NFR BLD AUTO: 1 % (ref 0–1)
BILIRUB SERPL-MCNC: 0.33 MG/DL (ref 0.2–1)
BNP SERPL-MCNC: 80 PG/ML (ref 0–100)
BUN SERPL-MCNC: 19 MG/DL (ref 5–25)
CALCIUM SERPL-MCNC: 8.7 MG/DL (ref 8.4–10.2)
CARBAMAZEPINE SERPL-MCNC: 7 UG/ML (ref 4–12)
CARDIAC TROPONIN I PNL SERPL HS: 7 NG/L
CARDIAC TROPONIN I PNL SERPL HS: 8 NG/L
CARDIAC TROPONIN I PNL SERPL HS: 8 NG/L
CHLORIDE SERPL-SCNC: 103 MMOL/L (ref 96–108)
CK SERPL-CCNC: 27 U/L (ref 26–192)
CO2 SERPL-SCNC: 31 MMOL/L (ref 21–32)
CREAT SERPL-MCNC: 0.73 MG/DL (ref 0.6–1.3)
EOSINOPHIL # BLD AUTO: 0.02 THOUSAND/ÂΜL (ref 0–0.61)
EOSINOPHIL NFR BLD AUTO: 0 % (ref 0–6)
ERYTHROCYTE [DISTWIDTH] IN BLOOD BY AUTOMATED COUNT: 13.8 % (ref 11.6–15.1)
FLUAV RNA RESP QL NAA+PROBE: NEGATIVE
FLUBV RNA RESP QL NAA+PROBE: NEGATIVE
GFR SERPL CREATININE-BSD FRML MDRD: 86 ML/MIN/1.73SQ M
GLUCOSE SERPL-MCNC: 71 MG/DL (ref 65–140)
HCT VFR BLD AUTO: 36.2 % (ref 34.8–46.1)
HGB BLD-MCNC: 11.9 G/DL (ref 11.5–15.4)
IMM GRANULOCYTES # BLD AUTO: 0.02 THOUSAND/UL (ref 0–0.2)
IMM GRANULOCYTES NFR BLD AUTO: 0 % (ref 0–2)
LYMPHOCYTES # BLD AUTO: 0.39 THOUSANDS/ÂΜL (ref 0.6–4.47)
LYMPHOCYTES NFR BLD AUTO: 7 % (ref 14–44)
MCH RBC QN AUTO: 32.4 PG (ref 26.8–34.3)
MCHC RBC AUTO-ENTMCNC: 32.9 G/DL (ref 31.4–37.4)
MCV RBC AUTO: 99 FL (ref 82–98)
MONOCYTES # BLD AUTO: 0.66 THOUSAND/ÂΜL (ref 0.17–1.22)
MONOCYTES NFR BLD AUTO: 12 % (ref 4–12)
NEUTROPHILS # BLD AUTO: 4.29 THOUSANDS/ÂΜL (ref 1.85–7.62)
NEUTS SEG NFR BLD AUTO: 80 % (ref 43–75)
NRBC BLD AUTO-RTO: 0 /100 WBCS
P AXIS: 30 DEGREES
P AXIS: 34 DEGREES
P AXIS: 34 DEGREES
PLATELET # BLD AUTO: 199 THOUSANDS/UL (ref 149–390)
PMV BLD AUTO: 8.1 FL (ref 8.9–12.7)
POTASSIUM SERPL-SCNC: 3.3 MMOL/L (ref 3.5–5.3)
PR INTERVAL: 142 MS
PR INTERVAL: 144 MS
PR INTERVAL: 144 MS
PROT SERPL-MCNC: 6.3 G/DL (ref 6.4–8.4)
QRS AXIS: 14 DEGREES
QRS AXIS: 35 DEGREES
QRS AXIS: 42 DEGREES
QRSD INTERVAL: 82 MS
QRSD INTERVAL: 88 MS
QRSD INTERVAL: 90 MS
QT INTERVAL: 380 MS
QT INTERVAL: 406 MS
QT INTERVAL: 414 MS
QTC INTERVAL: 414 MS
QTC INTERVAL: 421 MS
QTC INTERVAL: 441 MS
RBC # BLD AUTO: 3.67 MILLION/UL (ref 3.81–5.12)
RSV RNA RESP QL NAA+PROBE: NEGATIVE
SARS-COV-2 RNA RESP QL NAA+PROBE: POSITIVE
SODIUM SERPL-SCNC: 137 MMOL/L (ref 135–147)
T WAVE AXIS: 24 DEGREES
T WAVE AXIS: 45 DEGREES
T WAVE AXIS: 46 DEGREES
VENTRICULAR RATE: 60 BPM
VENTRICULAR RATE: 71 BPM
VENTRICULAR RATE: 74 BPM
WBC # BLD AUTO: 5.43 THOUSAND/UL (ref 4.31–10.16)

## 2023-12-20 PROCEDURE — 93010 ELECTROCARDIOGRAM REPORT: CPT | Performed by: INTERNAL MEDICINE

## 2023-12-20 PROCEDURE — 99222 1ST HOSP IP/OBS MODERATE 55: CPT | Performed by: PHYSICIAN ASSISTANT

## 2023-12-20 PROCEDURE — 99205 OFFICE O/P NEW HI 60 MIN: CPT | Performed by: PSYCHIATRY & NEUROLOGY

## 2023-12-20 PROCEDURE — G1004 CDSM NDSC: HCPCS

## 2023-12-20 PROCEDURE — 70498 CT ANGIOGRAPHY NECK: CPT

## 2023-12-20 PROCEDURE — 71045 X-RAY EXAM CHEST 1 VIEW: CPT

## 2023-12-20 PROCEDURE — 99285 EMERGENCY DEPT VISIT HI MDM: CPT | Performed by: EMERGENCY MEDICINE

## 2023-12-20 PROCEDURE — 82550 ASSAY OF CK (CPK): CPT | Performed by: PHYSICIAN ASSISTANT

## 2023-12-20 PROCEDURE — 93005 ELECTROCARDIOGRAM TRACING: CPT

## 2023-12-20 PROCEDURE — 83880 ASSAY OF NATRIURETIC PEPTIDE: CPT | Performed by: PHYSICIAN ASSISTANT

## 2023-12-20 PROCEDURE — 80053 COMPREHEN METABOLIC PANEL: CPT

## 2023-12-20 PROCEDURE — 0241U HB NFCT DS VIR RESP RNA 4 TRGT: CPT | Performed by: EMERGENCY MEDICINE

## 2023-12-20 PROCEDURE — 80156 ASSAY CARBAMAZEPINE TOTAL: CPT | Performed by: PHYSICIAN ASSISTANT

## 2023-12-20 PROCEDURE — 99285 EMERGENCY DEPT VISIT HI MDM: CPT

## 2023-12-20 PROCEDURE — 70450 CT HEAD/BRAIN W/O DYE: CPT

## 2023-12-20 PROCEDURE — 85025 COMPLETE CBC W/AUTO DIFF WBC: CPT

## 2023-12-20 PROCEDURE — 36415 COLL VENOUS BLD VENIPUNCTURE: CPT

## 2023-12-20 PROCEDURE — 70496 CT ANGIOGRAPHY HEAD: CPT

## 2023-12-20 PROCEDURE — 84484 ASSAY OF TROPONIN QUANT: CPT

## 2023-12-20 RX ORDER — ASPIRIN 81 MG/1
81 TABLET, CHEWABLE ORAL DAILY
Status: DISCONTINUED | OUTPATIENT
Start: 2023-12-21 | End: 2023-12-31 | Stop reason: HOSPADM

## 2023-12-20 RX ORDER — ENOXAPARIN SODIUM 100 MG/ML
40 INJECTION SUBCUTANEOUS DAILY
Status: DISCONTINUED | OUTPATIENT
Start: 2023-12-21 | End: 2023-12-31 | Stop reason: HOSPADM

## 2023-12-20 RX ORDER — OLANZAPINE 10 MG/1
40 TABLET ORAL
Status: DISCONTINUED | OUTPATIENT
Start: 2023-12-20 | End: 2023-12-31 | Stop reason: HOSPADM

## 2023-12-20 RX ORDER — ASPIRIN 81 MG/1
324 TABLET, CHEWABLE ORAL ONCE
Status: COMPLETED | OUTPATIENT
Start: 2023-12-20 | End: 2023-12-20

## 2023-12-20 RX ORDER — CLOPIDOGREL BISULFATE 75 MG/1
300 TABLET ORAL ONCE
Status: COMPLETED | OUTPATIENT
Start: 2023-12-20 | End: 2023-12-20

## 2023-12-20 RX ORDER — CARBAMAZEPINE 200 MG/1
400 TABLET ORAL
Status: DISCONTINUED | OUTPATIENT
Start: 2023-12-20 | End: 2023-12-31 | Stop reason: HOSPADM

## 2023-12-20 RX ORDER — ONDANSETRON 2 MG/ML
4 INJECTION INTRAMUSCULAR; INTRAVENOUS EVERY 6 HOURS PRN
Status: DISCONTINUED | OUTPATIENT
Start: 2023-12-20 | End: 2023-12-31 | Stop reason: HOSPADM

## 2023-12-20 RX ORDER — ATORVASTATIN CALCIUM 40 MG/1
40 TABLET, FILM COATED ORAL EVERY EVENING
Status: DISCONTINUED | OUTPATIENT
Start: 2023-12-20 | End: 2023-12-31 | Stop reason: HOSPADM

## 2023-12-20 RX ADMIN — CARBAMAZEPINE 400 MG: 200 TABLET ORAL at 21:32

## 2023-12-20 RX ADMIN — CLOPIDOGREL BISULFATE 300 MG: 75 TABLET ORAL at 15:45

## 2023-12-20 RX ADMIN — IOHEXOL 85 ML: 350 INJECTION, SOLUTION INTRAVENOUS at 13:14

## 2023-12-20 RX ADMIN — OLANZAPINE 40 MG: 10 TABLET, FILM COATED ORAL at 21:32

## 2023-12-20 RX ADMIN — ATORVASTATIN CALCIUM 40 MG: 40 TABLET, FILM COATED ORAL at 21:32

## 2023-12-20 RX ADMIN — ASPIRIN 81 MG CHEWABLE TABLET 324 MG: 81 TABLET CHEWABLE at 15:45

## 2023-12-20 NOTE — ASSESSMENT & PLAN NOTE
Presented to the hospital with slurred speech and confusion x 3 days  Found to be COVID-positive  Asymptomatic and not hypoxic  CXR without infiltrate or consolidation  Therefore hold off on remdesivir  Troponin without elevation  Check BMP and CK per protocol

## 2023-12-20 NOTE — H&P
[facility]  H&P  Name: Elvi Castillo 68 y.o. female I MRN: [id number]  Unit/Bed#: ED-05 I Date of Admission: 12/20/2023   Date of Service: 12/20/2023 I Hospital Day: 0      Assessment/Plan   * Stroke-like symptoms  Assessment & Plan  Presented to the ED with 3 days of confusion and slurred speech  CTA head with age-indeterminate hypodensity in left middle cerebral artery territory in temporoparietal junction with focal hyperdense vessel sign indicative of vascular occlusion.  Additionally mild local sulcal effacement favoring a more recent/acute/subacute event  Admitted on stroke pathway  Received aspirin and Plavix bolus  Started on aspirin 81 mg daily and atorvastatin 40 mg  Allow for permissive hypertension-hold home amlodipine  Monitor on telemetry send neurochecks  Obtain MRI brain and echo  Neurology consulted  PT OT consulted    COVID-19  Assessment & Plan  Presented to the hospital with slurred speech and confusion x 3 days  Found to be COVID-positive  Asymptomatic and not hypoxic  CXR without infiltrate or consolidation  Therefore hold off on remdesivir  Troponin without elevation  Check BMP and CK per protocol    History of brain tumor  Assessment & Plan  Per sister, pt has a history of a brain tumor that was resected in the patient's 20s  Currently patient lives with her sister who provides most care and assistance  At baseline patient is able to perform simple tasks like dress herself but does have ST memory loss  Worsened confusion and speech difficulties in the setting of acute CVA  Currently on CVA pathway    Parotid tumor  Assessment & Plan  CT head performed revealing 4.6 cm left parotid mass which was previously imaged and is stable from August  Has seen ENT as an outpatient had biopsy performed 9/18/2023-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  For removal on 11/8/2023 however there was an issue with her preoperative EGD and surgery was not performed  Was scheduled to undergo  additional cardiac workup prior to rescheduling removal of tumor  Ongoing outpt followup with ENT    Hypokalemia  Assessment & Plan  Potassium 3.3 at admission  Replete and continue to follow  Check am magnesium    Seizure disorder (HCC)  Assessment & Plan  Maintained on Tegretol for 100 mg twice daily    Schizoaffective disorder (HCC)  Assessment & Plan  Continue home regimen of Zyprexa 40 mg at bed    Hypertension  Assessment & Plan  Presenting with hypertension urgency in the setting of acute stroke  Home regimen amlodipine 5 mg daily  Hold amlodipine in the setting of CVA pathway to allow for permissive hypertension           VTE Prophylaxis: Enoxaparin (Lovenox)  / sequential compression device   Code Status: full code  Anticipated Length of Stay:  Patient will be admitted on an Inpatient basis with an anticipated length of stay of greater than 2 midnights. Justification for Hospital Stay: cva pathway    Chief Complaint:   Confusion / speech difficulties x 3 days    History of Present Illness:    Elvi Castillo is a 68 y.o. female with a PMH of schizophrenia, Short term memory loss, hypertension, history of drug overdose, history of brain tumor with surgical resection, history of parotid tumor.    She presents with confusion and slurred speech x 3 days.  Most history was obtained from sister via telephone as patient does have a difficulty with her speech. Patient lives with her sister and at baseline is able to perform daily ADLs and dress herself.  She has been having worsening confusion and has been unable to perform these simple tasks.  Sister was concerned and brought her to the ER for further evaluation.    In the ER she was found to have an abnormal CAT scan of her brain with concern for stroke and was placed on the stroke pathway.  She was also found to be COVID-positive.  Patient herself has had no recent known exposure to COVID and is currently asymptomatic from a respiratory standpoint.  No cough,  congestion, shortness of breath.    Ambulates with walker at baseline, however is unable to do this at present.  Sister expresses concern about being able to continually care for the patient given her current medical condition.     Review of Systems:    General:   No Fever or chills; No significant weight loss or gain.   EENT:   No ear pain, facial swelling; No sneezing, sore throat.   Skin:   No rashes, color changes.   Respiratory:     No shortness of breath, cough, wheezing, stridor.   Cardiovascular:     No chest pain, palpitations.   Gastrointestinal:    No nausea, vomiting, diarrhea; No abdominal pain.   Musculoskeletal:     No arthralgias, myalgias, swelling.   Neurologic:   No dizziness, numbness, weakness. No speech difficulties.   Psych:   No agitation, suicidal ideations.    Otherwise, All other twelve-point review of systems normal.   Unable to perform ROS given mental status    Past Medical and Surgical History:     Past Medical History:   Diagnosis Date    History of drug overdose 2005    Hypertension     Memory loss     Schizophrenia (HCC)        Past Surgical History:   Procedure Laterality Date    BRAIN SURGERY      Brain Tumor surgery - age late 20's done @ [facility]-    CARDIAC CATHETERIZATION  2015    HIP ARTHROPLASTY Right 01/15/2021    Procedure: HemiARTHROPLASTY HIP bipolar;  Surgeon: Mahesh Finch MD;  [facility];  Service: Orthopedics    OK HEMIARTHROPLASTY HIP PARTIAL Right 01/15/2021    Procedure: HEMIARTHROPLASTY HIP (BIPOLAR);  Surgeon: Mahesh Finch MD;  [facility];  Service: Orthopedics    US GUIDED LYMPH NODE BIOPSY LEFT  09/18/2023       Meds/Allergies:    No current facility-administered medications for this encounter.     Current Outpatient Medications   Medication Sig Dispense Refill    amLODIPine (NORVASC) 5 mg tablet Take 1 tablet (5 mg total) by mouth daily 90 tablet 3    carBAMazepine (TEGretol) 200 mg tablet Take 400 mg by mouth daily at bedtime       cholecalciferol (VITAMIN D3) 400 units tablet Take 400 Units by mouth daily      multivitamin (THERAGRAN) TABS Take 1 tablet by mouth daily      OLANZapine (ZyPREXA) 20 MG tablet Take 40 mg by mouth daily at bedtime          Allergies   Allergen Reactions    Penicillins Hives       Allergies:   Allergies   Allergen Reactions    Penicillins Hives       Social History:     Marital Status: Single     Substance Use History:   Social History     Substance and Sexual Activity   Alcohol Use Not Currently     Social History     Tobacco Use   Smoking Status Never   Smokeless Tobacco Never   Tobacco Comments    onset date: 8/2/2017      Social History     Substance and Sexual Activity   Drug Use Not Currently       Family History:    non-contributory    Physical Exam:     Vitals:   Blood Pressure: (!) 174/93 (12/20/23 1430)  Pulse: 73 (12/20/23 1430)  Temperature: 97.6 °F (36.4 °C) (12/20/23 0913)  Temp Source: Oral (12/20/23 0913)  Respirations: 16 (12/20/23 1430)  SpO2: 99 % (12/20/23 1430)    Physical Exam  Vitals and nursing note reviewed.   Constitutional:       General: She is not in acute distress.     Appearance: She is normal weight. She is not ill-appearing, toxic-appearing or diaphoretic.   HENT:      Head: Normocephalic and atraumatic.      Comments: Large parotid tumor to left side of face  Cardiovascular:      Rate and Rhythm: Normal rate and regular rhythm.   Pulmonary:      Effort: Pulmonary effort is normal. No respiratory distress.      Breath sounds: Normal breath sounds. No stridor. No wheezing or rhonchi.   Abdominal:      General: Bowel sounds are normal. There is no distension.      Palpations: Abdomen is soft. There is no mass.      Tenderness: There is no abdominal tenderness.      Hernia: No hernia is present.   Musculoskeletal:      Cervical back: Neck supple.   Neurological:      Mental Status: She is alert.   Psychiatric:         Mood and Affect: Mood normal.         Additional Data:     Lab  Results: I have personally reviewed pertinent reports.      Results from last 7 days   Lab Units 12/20/23  0949   WBC Thousand/uL 5.43   HEMOGLOBIN g/dL 11.9   HEMATOCRIT % 36.2   PLATELETS Thousands/uL 199   NEUTROS PCT % 80*   LYMPHS PCT % 7*   MONOS PCT % 12   EOS PCT % 0     Results from last 7 days   Lab Units 12/20/23  0949   POTASSIUM mmol/L 3.3*   CHLORIDE mmol/L 103   CO2 mmol/L 31   BUN mg/dL 19   CREATININE mg/dL 0.73   CALCIUM mg/dL 8.7   ALK PHOS U/L 85   ALT U/L 19   AST U/L 20           Lines/Drains:  Invasive Devices       Peripheral Intravenous Line  Duration             Peripheral IV 12/20/23 Left Antecubital <1 day                    Imaging: I have personally reviewed pertinent reports.      CTA head and neck with and without contrast    Result Date: 12/20/2023  Narrative: CTA NECK AND BRAIN WITH AND WITHOUT CONTRAST INDICATION: abnormal head ct. COMPARISON:    12/20/2023; 8/27/2023; 3/1/2020 TECHNIQUE:  Routine CT imaging of the Brain without contrast.  Post contrast imaging was performed after administration of iodinated contrast through the neck and brain. Post contrast axial 0.625 mm images timed to opacify the arterial system. 3D rendering was performed on an independent workstation.   MIP reconstructions performed. Coronal reconstructions were performed of the noncontrast portion of the brain. Radiation dose length product (DLP) for this visit:  1292 mGy-cm .  This examination, like all CT scans performed in the [facility], was performed utilizing techniques to minimize radiation dose exposure, including the use of iterative reconstruction and automated exposure control. IV Contrast:  85 mL of iohexol (OMNIPAQUE) IMAGE QUALITY:   Diagnostic FINDINGS: NONCONTRAST BRAIN PARENCHYMA: Hypodensity in the left temporoparietal region redemonstrated, seen on series 301 image 26, correlating finding on series 2 image 24. Within this hypodense wedge-shaped region which extends to the  cortical surface there is a tubular hyperdense structure series 2 image 24 suspicious for hyperdense vessel sign. On correlating CTA image #64 of series 4 there is absent MCA cortical branch enhancement in this region, indicative of local, age indeterminant left M3/M4 vessel occlusion, similar to the head CT earlier today. Elsewhere there are moderate periventricular hypodensities. No acute intracranial hemorrhage. Cystic encephalomalacia and gliosis in the anterior aspect of the right temporal lobe noted subjacent to prior pterional craniotomy. Atherosclerotic calcifications noted. VENTRICLES AND EXTRA-AXIAL SPACES:  Normal for the patient's age. VISUALIZED ORBITS: Normal visualized orbits. PARANASAL SINUSES: Mild mucosal thickening of the visualized paranasal sinuses. CERVICAL VASCULATURE AORTIC ARCH AND GREAT VESSELS: Ascending aorta is prominent measuring 3.9 x 4.1 cm, stable from the August 27, 2023 neck CT. Aortic arch is atherosclerotic. RIGHT VERTEBRAL ARTERY CERVICAL SEGMENT: Mild stenosis at the origin. The vessel is normal in caliber throughout the neck. LEFT VERTEBRAL ARTERY CERVICAL SEGMENT: Mild stenosis at the origin. The vessel is normal in caliber throughout the neck. RIGHT EXTRACRANIAL CAROTID SEGMENT: Mild atherosclerotic disease of the distal common carotid artery and proximal cervical internal carotid artery without significant stenosis compared to the more distal ICA. LEFT EXTRACRANIAL CAROTID SEGMENT: Left common carotid artery is patent. Mild to moderate approximately 40% stenosis of the left internal carotid artery origin with both calcified and noncalcified atherosclerotic plaque. NASCET criteria was used to determine the degree of internal carotid artery diameter stenosis. INTRACRANIAL VASCULATURE INTERNAL CAROTID ARTERIES: Atherosclerotic calcifications of the cavernous segment of the internal carotid artery are moderate. Normal ophthalmic artery origins.  Normal ICA terminus. ANTERIOR  CIRCULATION:  Symmetric A1 segments and anterior cerebral arteries with normal enhancement.  Normal anterior communicating artery. MIDDLE CEREBRAL ARTERY CIRCULATION:  M1 segment and middle cerebral artery branches demonstrate normal enhancement bilaterally. DISTAL VERTEBRAL ARTERIES: Mild atherosclerotic calcifications in the intradural segments of both vertebral arteries. Posterior inferior cerebellar artery origins are normal. Normal vertebral basilar junction. BASILAR ARTERY:  Basilar artery is normal in caliber.  Normal superior cerebellar arteries. POSTERIOR CEREBRAL ARTERIES: Both posterior cerebral arteries arises from the basilar tip.  Both arteries demonstrate normal enhancement.   Normal posterior communicating arteries. VENOUS STRUCTURES:  Normal. NON VASCULAR ANATOMY BONY STRUCTURES: Right pterional craniotomy noted. Soft tissue in the both external auditory canal may be related to impacted cerumen. SOFT TISSUES OF THE NECK: Large heterogeneous left parotid mass with mixed cystic and solid components noted on series 4 image 127, measuring at least 4.6 cm AP by 2.8 cm transverse dimension, similar to the prior neck CT. THORACIC INLET:  Normal.     Impression: 1. Age indeterminant hypodensity in the left middle cerebral artery territory in the temporoparietal junction with a focal hyperdense vessel sign indicative of vascular occlusion. Given the vessel hyperdensity, recent infarction is not excluded. Additionally there is mild local sulcal effacement favoring a more recent/acute to subacute event. Further clinical assessment and follow-up advised. 2. 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 Both benign and malignant etiologies not excluded. Follow-up ENT assessment recommended if not previously performed. 3. Right temporal lobe encephalomalacia subjacent to prior pterional craniotomy. Correlation with neurosurgical history advised. 4. Mild to moderate atherosclerotic  narrowing of the left internal carotid artery origin less than 50% by NASCET criteria. 5. 4.1 cm aneurysm of the ascending aorta. Further clinical assessment and follow-up recommended. This is stable from August neck CT. Workstation performed: [id number]     CT head without contrast    Result Date: 12/20/2023  Narrative: CT BRAIN - WITHOUT CONTRAST INDICATION:   Delirium delirium. COMPARISON:  None. TECHNIQUE:  CT examination of the brain was performed.  Multiplanar 2D reformatted images were created from the source data. Radiation dose length product (DLP) for this visit:  813 mGy-cm .  This examination, like all CT scans performed in the [facility], was performed utilizing techniques to minimize radiation dose exposure, including the use of iterative reconstruction and automated exposure control. IMAGE QUALITY:  Diagnostic. FINDINGS: PARENCHYMA:  No intracranial mass, mass effect or midline shift. Small left posterior temporoparietal cortical hypodensity with asymmetric hyperdense peripheral MCA branch. No acute parenchymal hemorrhage. Moderate right temporal encephalomalacia attributed to sequela of prior mass resection as per history in epic. Periventricular, centrum semiovale, and subcortical white matter hypodensity is a nonspecific finding likely representing chronic microangiopathy. Calcification bilateral cavernous internal carotid and distal vertebral arteries. VENTRICLES AND EXTRA-AXIAL SPACES:  Normal for the patient's age. VISUALIZED ORBITS: Normal visualized orbits. PARANASAL SINUSES: Minimal mucosal thickening scattered in the paranasal sinuses. CALVARIUM AND EXTRACRANIAL SOFT TISSUES: Scalp unremarkable. Right temporoparietal craniotomy. Bilateral hyperostosis frontalis.     Impression: Small left posterior temporoparietal cortical hypodensity with hyperdense peripheral MCA vessel coursing through the parenchyma may represent acute to subacute infarction in the appropriate clinical  context. No intraparenchymal hemorrhage. Chronic microangiopathy. Right temporal postsurgical encephalomalacia. I personally discussed this study with JORGE CRUZ on 2023 at 12:34 PM. Workstation performed: [id number]     XR chest 1 view portable    Result Date: 2023  Narrative: CHEST INDICATION:   Confusion and altered mental status for 3 days. COMPARISON: Chest radiograph 2021 EXAM PERFORMED/VIEWS:  XR CHEST PORTABLE Images:  1 FINDINGS:  Monitoring leads and clips project over the chest. Prominence of the mediastinum due to AP portable technique and hypoinflation The lungs are clear.  No pneumothorax or pleural effusion. Osseous structures appear within normal limits for patient age.     Impression: No acute consolidation or congestion Resident: ROME VILCHIS I, the attending radiologist, have reviewed the images and agree with the final report above. Workstation performed: [id number]       EKG, Pathology, and Other Studies Reviewed on Admission:   EK sinus    [employer] Records Reviewed: Yes     Total Time for Visit, including Counseling / Coordination of Care:  Greater than 50% of this total time spent on direct patient counseling and coordination of care.      ** Please Note: This note has been constructed using a voice recognition system. **

## 2023-12-20 NOTE — ASSESSMENT & PLAN NOTE
Per sister, pt has a history of a brain tumor that was resected in the patient's 20s  Currently patient lives with her sister who provides most care and assistance  At baseline patient is able to perform simple tasks like dress herself but does have ST memory loss  Worsened confusion and speech difficulties in the setting of acute CVA  Currently on CVA pathway

## 2023-12-20 NOTE — ED NOTES
Neurology Providers at bedside.     Edith Euceda RN  12/20/23 0613       Edith Euceda RN  12/20/23 2433

## 2023-12-20 NOTE — CONSULTS
Consultation - Neurology   Elvi Castillo 68 y.o. female MRN: [id number]  Unit/Bed#: ED-05 Encounter: [id number]      Assessment/Plan     Stroke-like symptoms  Assessment & Plan  68-year-old female with seizure disorder on carbamazepine, status post right temporal mass resection, schizoaffective disorder, hypertension, presents with altered mental status.  Patient presents with 3-day history of increased difficulty performing ADLs and walking independently with apparent confusion, reported slurred and tangential speech.  Exam limited but significant for mixed aphasia.    -CT head demonstrated small left posterior temporoparietal cortical hypodensity with hyperdense peripheral MCA vessel coursing through the parenchyma may represent acute to subacute infarction in the appropriate clinical context.  There was also chronic right temporal postsurgical encephalomalacia.  -CTA head/neck with no LVO.  -Blood pressure on presentation 164/83.  -Patient AP/AC negative baseline.  -Patient on carbamazepine 400 mg twice daily at baseline.    Concern for new stroke given finding on CT head.    Plan:  -Carbamazepine level pending.  -Continue home carbamazepine 400 mg twice daily.  -Admit to stroke pathway.  -MRI brain without contrast.  -2D echo.  -Check hemoglobin A1c, lipid panel, TSH.  -Recommend loading ASA 325mg x1 and continue 81mg daily.  -Initiate atorvastatin 40 mg.  -Telemetry.  -PT/OT/speech therapy.  -Allow permissive hypertension, up to 200 systolic.  -Frequent neuro checks.  -Continue to monitor and notify with changes.            Recommendations for outpatient neurological follow up have yet to be determined.    History of Present Illness     Reason for Consult / Principal Problem: Altered mental status  Hx and PE limited by: Poor historian, apparent aphasia  HPI: Elvi Castillo is a 68 y.o.  female with seizure disorder on carbamazepine, status post right temporal mass resection, schizoaffective disorder,  hypertension, presents with altered mental status.    Per ED's discussion with patient's sister, patient has not been herself for the past 3 days.  She is typically able to complete her ADLs with minimal direction (for example, able to dress herself when told to), but over the past 3 days has required significant amount of help.  She is also being tangential and her speech has been slurred.  She also has required the use of a wheeled walker to ambulate and this is not normal for her.    She was found to be COVID-positive.  CT head demonstrated small left posterior temporoparietal cortical hypodensity with hyperdense peripheral MCA vessel coursing through the parenchyma may represent acute to subacute infarction in the appropriate clinical context.  There was also chronic right temporal postsurgical encephalomalacia.  Blood pressure on presentation 164/83.  Patient AP/AC negative baseline.  Per record review, patient on carbamazepine 400 mg twice daily.    On exam, patient appears to have some degree of expressive and perhaps some receptive aphasia, with wrong word substitutions and inconsistent ability to follow commands.  Limited exam.    Inpatient consult to Neurology  Consult performed by: Sabine Rollins PA-C  Consult ordered by: Jj Chaparro MD          Review of Systems   Unable to perform ROS: Mental status change       Historical Information   Past Medical History:   Diagnosis Date    History of drug overdose 2005    Hypertension     Memory loss     Schizophrenia (HCC)      Past Surgical History:   Procedure Laterality Date    BRAIN SURGERY      Brain Tumor surgery - age late 20's done @ [facility]-    CARDIAC CATHETERIZATION  2015    HIP ARTHROPLASTY Right 01/15/2021    Procedure: HemiARTHROPLASTY HIP bipolar;  Surgeon: Mahesh Finch MD;  Location: [facility];  Service: Orthopedics    [place] HEMIARTHROPLASTY HIP PARTIAL Right 01/15/2021    Procedure: HEMIARTHROPLASTY HIP (BIPOLAR);  Surgeon: Mahesh Finch  MD;  Location: [facility];  Service: Orthopedics    US GUIDED LYMPH NODE BIOPSY LEFT  09/18/2023     Social History   Social History     Substance and Sexual Activity   Alcohol Use Not Currently     Social History     Substance and Sexual Activity   Drug Use Not Currently     E-Cigarette/Vaping    E-Cigarette Use Never User      E-Cigarette/Vaping Substances    Nicotine No     THC No     CBD No     Flavoring No     Other No     Unknown No      Social History     Tobacco Use   Smoking Status Never   Smokeless Tobacco Never   Tobacco Comments    onset date: 8/2/2017      Family History:   Family History   Problem Relation Age of Onset    Other Mother         Cardiac Arrest     Heart disease Mother     Other Father         cardiac Arrest     Heart disease Father        Review of previous medical records was completed.     Meds/Allergies   PTA meds:   Prior to Admission Medications   Prescriptions Last Dose Informant Patient Reported? Taking?   OLANZapine (ZyPREXA) 20 MG tablet  Family Member Yes Yes   Sig: Take 40 mg by mouth daily at bedtime    amLODIPine (NORVASC) 5 mg tablet  Family Member No Yes   Sig: Take 1 tablet (5 mg total) by mouth daily   carBAMazepine (TEGretol) 200 mg tablet  Family Member Yes Yes   Sig: Take 400 mg by mouth 2 (two) times a day   cholecalciferol (VITAMIN D3) 400 units tablet  Family Member Yes Yes   Sig: Take 400 Units by mouth daily   multivitamin (THERAGRAN) TABS  Family Member Yes Yes   Sig: Take 1 tablet by mouth daily      Facility-Administered Medications: None       Allergies   Allergen Reactions    Penicillins Hives       Objective   Vitals:Blood pressure (!) 181/89, pulse 72, temperature 97.6 °F (36.4 °C), temperature source Oral, resp. rate 15, SpO2 92%.,There is no height or weight on file to calculate BMI.  No intake or output data in the 24 hours ending 12/20/23 8273    Invasive Devices:   Invasive Devices       Peripheral Intravenous Line  Duration             Peripheral IV  "12/20/23 Left Antecubital <1 day                    Physical Exam  General:  Patient is well-developed, well-nourished, and in no acute distress.  HEENT:  Head normocephalic.  Eyes anicteric.  Left parotid mass noted.  Cardiovascular:  With regular rhythm.  Lungs:  Normal effort. Nonlabored breathing.  Extremities:  With no significant edema.    Skin: No rashes.    Neurologic Exam limited secondary to apparent mixed aphasia  Neurologic:  Patient is alert.  Speech at times nonsensical with wrong word substitutions.  Able to follow some midline and appendicular commands, though inconsistent.  For instance, perseverates on showing her index finger when asked to show her thumb, then perseverates on lifting a lower extremity asked to raise her thumb, and repeatedly elevates bilateral lower extremities when asked to raise 1 lower extremity.  Also inconsistent with naming simple objects/body parts.  Speech is dysarthric.    Gait deferred for safety.    Cranial Nerves:   II: Positive bilateral blink to threat.  Cannot visualize optic fundi.  III,IV,VI: extraocular movements intact with no nystagmus.   VII: Face is symmetric with no weakness noted.     Does not appear to have any obvious lateralized weakness but limited exam due to some difficulty following commands.    Lab Results: CBC:   Results from last 7 days   Lab Units 12/20/23  0949   WBC Thousand/uL 5.43   RBC Million/uL 3.67*   HEMOGLOBIN g/dL 11.9   HEMATOCRIT % 36.2   MCV fL 99*   PLATELETS Thousands/uL 199   , BMP/CMP:   Results from last 7 days   Lab Units 12/20/23  0949   SODIUM mmol/L 137   POTASSIUM mmol/L 3.3*   CHLORIDE mmol/L 103   CO2 mmol/L 31   BUN mg/dL 19   CREATININE mg/dL 0.73   CALCIUM mg/dL 8.7   AST U/L 20   ALT U/L 19   ALK PHOS U/L 85   EGFR ml/min/1.73sq m 86   , Medication Drug Levels:       Invalid input(s): \"CARBAMAZEPINE\", \"LACOSAMIDE\", \"OXCARBAZEPINE\"  Imaging Studies: I have personally reviewed pertinent reports.   and I have personally " reviewed pertinent films in PACS CTH, CTA h/n  EKG, Pathology, and Other Studies: I have personally reviewed pertinent reports.    VTE Prophylaxis: Sequential compression device (Venodyne)     Code Status: Prior  Advance Directive and Living Will:      Power of :    POLST:

## 2023-12-20 NOTE — ASSESSMENT & PLAN NOTE
Presented to the ED with 3 days of confusion and slurred speech  CTA head with age-indeterminate hypodensity in left middle cerebral artery territory in temporoparietal junction with focal hyperdense vessel sign indicative of vascular occlusion.  Additionally mild local sulcal effacement favoring a more recent/acute/subacute event  Admitted on stroke pathway  Received aspirin and Plavix bolus  Started on aspirin 81 mg daily and atorvastatin 40 mg  Allow for permissive hypertension-hold home amlodipine  Monitor on telemetry send neurochecks  Obtain MRI brain and echo  Neurology consulted  PT OT consulted

## 2023-12-20 NOTE — ASSESSMENT & PLAN NOTE
Presenting with hypertension urgency in the setting of acute stroke  Home regimen amlodipine 5 mg daily  Hold amlodipine in the setting of CVA pathway to allow for permissive hypertension

## 2023-12-20 NOTE — ASSESSMENT & PLAN NOTE
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

## 2023-12-20 NOTE — ED NOTES
Sister is leaving, taking belonings except pants & underwear     Edith Euceda, JOHN  12/20/23 2183

## 2023-12-20 NOTE — ED NOTES
Pt's sister would like an update/call once bed assignment is known.      Sofiya Donovan RN  12/20/23 0917

## 2023-12-20 NOTE — ED PROVIDER NOTES
History  Chief Complaint   Patient presents with    Altered Mental Status     Per sister patient has been confused for 3 days and not being able to complete sentences. Pt can normally dress herself and today she could not. When asking a question pt takes randomly about something else. Per sister pt speech is slurred.      68-year-old female presents for evaluation of 3 days of confusion, weakness, slurred speech.  No reported falls or head trauma.  No other complaints.      History provided by:  Relative  History limited by:  Severe respiratory distress  Altered Mental Status      Prior to Admission Medications   Prescriptions Last Dose Informant Patient Reported? Taking?   OLANZapine (ZyPREXA) 20 MG tablet  Family Member Yes Yes   Sig: Take 40 mg by mouth daily at bedtime    amLODIPine (NORVASC) 5 mg tablet  Family Member No Yes   Sig: Take 1 tablet (5 mg total) by mouth daily   carBAMazepine (TEGretol) 200 mg tablet  Family Member Yes Yes   Sig: Take 400 mg by mouth 2 (two) times a day   cholecalciferol (VITAMIN D3) 400 units tablet  Family Member Yes Yes   Sig: Take 400 Units by mouth daily   multivitamin (THERAGRAN) TABS  Family Member Yes Yes   Sig: Take 1 tablet by mouth daily      Facility-Administered Medications: None       Past Medical History:   Diagnosis Date    History of drug overdose 2005    Hypertension     Memory loss     Schizophrenia (HCC)        Past Surgical History:   Procedure Laterality Date    BRAIN SURGERY      Brain Tumor surgery - age late 20's done @ [facility]-    CARDIAC CATHETERIZATION  2015    HIP ARTHROPLASTY Right 01/15/2021    Procedure: HemiARTHROPLASTY HIP bipolar;  Surgeon: Mahesh Finch MD;  Location: [facility];  Service: Orthopedics    [place] HEMIARTHROPLASTY HIP PARTIAL Right 01/15/2021    Procedure: HEMIARTHROPLASTY HIP (BIPOLAR);  Surgeon: Mahesh Finch MD;  Location: [facility];  Service: Orthopedics    US GUIDED LYMPH NODE BIOPSY LEFT  09/18/2023       Family History    Problem Relation Age of Onset    Other Mother         Cardiac Arrest     Heart disease Mother     Other Father         cardiac Arrest     Heart disease Father      I have reviewed and agree with the history as documented.    E-Cigarette/Vaping    E-Cigarette Use Never User      E-Cigarette/Vaping Substances    Nicotine No     THC No     CBD No     Flavoring No     Other No     Unknown No      Social History     Tobacco Use    Smoking status: Never    Smokeless tobacco: Never    Tobacco comments:     onset date: 8/2/2017    Vaping Use    Vaping status: Never Used   Substance Use Topics    Alcohol use: Not Currently    Drug use: Not Currently       Review of Systems   Unable to perform ROS: Mental status change       Physical Exam  Physical Exam  Vitals and nursing note reviewed.   Constitutional:       Appearance: She is well-developed.   Cardiovascular:      Rate and Rhythm: Normal rate and regular rhythm.   Pulmonary:      Effort: Pulmonary effort is normal.      Breath sounds: Normal breath sounds.   Abdominal:      General: There is no distension.      Tenderness: There is no abdominal tenderness.   Musculoskeletal:         General: No swelling or tenderness.      Cervical back: Normal range of motion and neck supple.   Neurological:      Mental Status: She is alert. She is confused.      GCS: GCS eye subscore is 4. GCS verbal subscore is 4. GCS motor subscore is 6.      Comments: Pt does not follow commands, unable to perform nih stroke scale. Pt is not a candidate for tpa as symptoms are outside the window.          Vital Signs  ED Triage Vitals   Temperature Pulse Respirations Blood Pressure SpO2   12/20/23 0913 12/20/23 0912 12/20/23 0912 12/20/23 0914 12/20/23 0912   97.6 °F (36.4 °C) 77 19 164/83 100 %      Temp Source Heart Rate Source Patient Position - Orthostatic VS BP Location FiO2 (%)   12/20/23 0913 12/20/23 1045 12/20/23 0914 12/20/23 0914 --   Oral Monitor Lying Right arm       Pain Score        --                  Vitals:    12/20/23 1130 12/20/23 1200 12/20/23 1215 12/20/23 1330   BP:  (!) 191/82 (!) 191/96 (!) 181/89   Pulse: 66 73 70 72   Patient Position - Orthostatic VS:             Visual Acuity      ED Medications  Medications   aspirin chewable tablet 324 mg (has no administration in time range)   iohexol (OMNIPAQUE) 350 MG/ML injection (SINGLE-DOSE) 85 mL (85 mL Intravenous Given 12/20/23 1314)       Diagnostic Studies  Results Reviewed       Procedure Component Value Units Date/Time    HS Troponin I 4hr [[id number]]  (Normal) Collected: 12/20/23 1404    Lab Status: Final result Specimen: Blood from Arm, Left Updated: 12/20/23 1434     hs TnI 4hr 8 ng/L      Delta 4hr hsTnI 1 ng/L     Carbamazepine level, total [[id number]]  (Normal) Collected: 12/20/23 0949    Lab Status: Final result Specimen: Blood from Arm, Left Updated: 12/20/23 1428     Carbamazepine Lvl 7.0 ug/mL     HS Troponin I 2hr [[id number]]  (Normal) Collected: 12/20/23 1203    Lab Status: Final result Specimen: Blood from Arm, Right Updated: 12/20/23 1235     hs TnI 2hr 8 ng/L      Delta 2hr hsTnI 1 ng/L     FLU/RSV/COVID - if FLU/RSV clinically relevant [[id number]]  (Abnormal) Collected: 12/20/23 1017    Lab Status: Final result Specimen: Nares from Nose Updated: 12/20/23 1114     SARS-CoV-2 Positive     INFLUENZA A PCR Negative     INFLUENZA B PCR Negative     RSV PCR Negative    Narrative:      FOR PEDIATRIC PATIENTS - copy/paste COVID Guidelines URL to browser: [email or web address]    SARS-CoV-2 assay is a Nucleic Acid Amplification assay intended for the  qualitative detection of nucleic acid from SARS-CoV-2 in nasopharyngeal  swabs. Results are for the presumptive identification of SARS-CoV-2 RNA.    Positive results are indicative of infection with SARS-CoV-2, the virus  causing COVID-19, but do not rule out bacterial infection or co-infection  with other viruses.  Laboratories within the [place] and its  territories are required to report all positive results to the appropriate  public health authorities. Negative results do not preclude SARS-CoV-2  infection and should not be used as the sole basis for treatment or other  patient management decisions. Negative results must be combined with  clinical observations, patient history, and epidemiological information.  This test has not been FDA cleared or approved.    This test has been authorized by FDA under an Emergency Use Authorization  (EUA). This test is only authorized for the duration of time the  declaration that circumstances exist justifying the authorization of the  emergency use of an in vitro diagnostic tests for detection of SARS-CoV-2  virus and/or diagnosis of COVID-19 infection under section 564(b)(1) of  the Act, 21 U.S.C. 360bbb-3(b)(1), unless the authorization is terminated  or revoked sooner. The test has been validated but independent review by FDA  and CLIA is pending.    Test performed using [employer] GeneXpert: This RT-PCR assay targets N2,  a region unique to SARS-CoV-2. A conserved region in the E-gene was chosen  for pan-Sarbecovirus detection which includes SARS-CoV-2.    According to CMS-2020-01-R, this platform meets the definition of high-throughput technology.    Comprehensive metabolic panel [[id number]]  (Abnormal) Collected: 12/20/23 0949    Lab Status: Final result Specimen: Blood from Arm, Left Updated: 12/20/23 1022     Sodium 137 mmol/L      Potassium 3.3 mmol/L      Chloride 103 mmol/L      CO2 31 mmol/L      ANION GAP 3 mmol/L      BUN 19 mg/dL      Creatinine 0.73 mg/dL      Glucose 71 mg/dL      Calcium 8.7 mg/dL      AST 20 U/L      ALT 19 U/L      Alkaline Phosphatase 85 U/L      Total Protein 6.3 g/dL      Albumin 3.6 g/dL      Total Bilirubin 0.33 mg/dL      eGFR 86 ml/min/1.73sq m     Narrative:      National Kidney Disease Foundation guidelines for Chronic Kidney Disease  (CKD):     Stage 1 with normal or high GFR (GFR > 90 mL/min/1.73 square meters)    Stage 2 Mild CKD (GFR = 60-89 mL/min/1.73 square meters)    Stage 3A Moderate CKD (GFR = 45-59 mL/min/1.73 square meters)    Stage 3B Moderate CKD (GFR = 30-44 mL/min/1.73 square meters)    Stage 4 Severe CKD (GFR = 15-29 mL/min/1.73 square meters)    Stage 5 End Stage CKD (GFR <15 mL/min/1.73 square meters)  Note: GFR calculation is accurate only with a steady state creatinine    HS Troponin 0hr (reflex protocol) [[id number]]  (Normal) Collected: 12/20/23 0949    Lab Status: Final result Specimen: Blood from Arm, Left Updated: 12/20/23 1019     hs TnI 0hr 7 ng/L     CBC and differential [[id number]]  (Abnormal) Collected: 12/20/23 0949    Lab Status: Final result Specimen: Blood from Arm, Left Updated: 12/20/23 0956     WBC 5.43 Thousand/uL      RBC 3.67 Million/uL      Hemoglobin 11.9 g/dL      Hematocrit 36.2 %      MCV 99 fL      MCH 32.4 pg      MCHC 32.9 g/dL      RDW 13.8 %      MPV 8.1 fL      Platelets 199 Thousands/uL      nRBC 0 /100 WBCs      Neutrophils Relative 80 %      Immat GRANS % 0 %      Lymphocytes Relative 7 %      Monocytes Relative 12 %      Eosinophils Relative 0 %      Basophils Relative 1 %      Neutrophils Absolute 4.29 Thousands/µL      Immature Grans Absolute 0.02 Thousand/uL      Lymphocytes Absolute 0.39 Thousands/µL      Monocytes Absolute 0.66 Thousand/µL      Eosinophils Absolute 0.02 Thousand/µL      Basophils Absolute 0.05 Thousands/µL                    CTA head and neck with and without contrast   Final Result by oJse Burnett MD (12/20 1426)         1. Age indeterminant hypodensity in the left middle cerebral artery territory in the temporoparietal junction with a focal hyperdense vessel sign indicative of vascular occlusion. Given the vessel hyperdensity, recent infarction is not excluded.    Additionally there is mild local sulcal effacement favoring a more recent/acute to subacute event.  Further clinical assessment and follow-up advised.   2. 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 Both benign and malignant etiologies not excluded. Follow-up ENT assessment recommended if not previously performed.   3. Right temporal lobe encephalomalacia subjacent to prior pterional craniotomy. Correlation with neurosurgical history advised.   4. Mild to moderate atherosclerotic narrowing of the left internal carotid artery origin less than 50% by NASCET criteria.   5. 4.1 cm aneurysm of the ascending aorta. Further clinical assessment and follow-up recommended. This is stable from August neck CT.                  Workstation performed: [id number]         CT head without contrast   Final Result by Orville Luek MD (12/20 1234)      Small left posterior temporoparietal cortical hypodensity with hyperdense peripheral MCA vessel coursing through the parenchyma may represent acute to subacute infarction in the appropriate clinical context.      No intraparenchymal hemorrhage.      Chronic microangiopathy.      Right temporal postsurgical encephalomalacia.         I personally discussed this study with JORGE CHAPARRO on 12/20/2023 at 12:34 PM.            Workstation performed: [id number]         XR chest 1 view portable   ED Interpretation by Jorge Chaparro MD (12/20 3729)   Primary reviewed: no acute abnormality      Final Result by Carissa Le MD (12/20 3432)   No acute consolidation or congestion            Resident: ROME VILCHIS I, the attending radiologist, have reviewed the images and agree with the final report above.      Workstation performed: [id number]                    Procedures  ECG 12 Lead Documentation Only    Date/Time: 12/20/2023 12:00 PM    Performed by: Jorge Chaparro MD  Authorized by: Jorge Chaparro MD    ECG reviewed by me, the ED Provider: yes    Patient location:  ED  Rate:     ECG rate:  71    ECG rate assessment: normal     Rhythm:     Rhythm: sinus rhythm    Ectopy:     Ectopy: none    QRS:     QRS axis:  Normal    QRS intervals:  Normal  Conduction:     Conduction: normal    ST segments:     ST segments:  Normal  T waves:     T waves: non-specific             ED Course  ED Course as of 12/20/23 1446   Wed Dec 20, 2023   1245 Case d/w dr vargas of neurology, will do stat cta head and neck                               SBIRT 22yo+      Flowsheet Row Most Recent Value   Initial Alcohol Screen: US AUDIT-C     1. How often do you have a drink containing alcohol? 0 Filed at: 12/20/2023 1026   2. How many drinks containing alcohol do you have on a typical day you are drinking?  0 Filed at: 12/20/2023 1026   3a. Male UNDER 65: How often do you have five or more drinks on one occasion? 0 Filed at: 12/20/2023 1026   3b. FEMALE Any Age, or MALE 65+: How often do you have 4 or more drinks on one occassion? 0 Filed at: 12/20/2023 1026   Audit-C Score 0 Filed at: 12/20/2023 1026   LASHONDA: How many times in the past year have you...    Used an illegal drug or used a prescription medication for non-medical reasons? Never Filed at: 12/20/2023 1026                      Medical Decision Making  Altered mental status-will do CT head to rule out CNS bleed, stroke, cardiac workup to rule out acute coronary syndrome dysrhythmia, pneumonia, pneumothorax, congestive heart failure, electrolyte abnormality, urine dip for UTI, COVID flu RSV to rule out infectious etiology, admit.    Amount and/or Complexity of Data Reviewed  Radiology: ordered and independent interpretation performed.    Risk  OTC drugs.  Prescription drug management.  Decision regarding hospitalization.             Disposition  Final diagnoses:   Abnormal head CT   Stroke (HCC)   COVID   Hypertension   Parotid tumor     Time reflects when diagnosis was documented in both MDM as applicable and the Disposition within this note       Time User Action Codes Description Comment    12/20/2023 12:53 PM  Jj Chaparro Add [R93.0] Abnormal head CT     12/20/2023  2:45 PM Jj Chaparro Add [I63.9] Stroke (HCC)     12/20/2023  2:45 PM Jj Chaparro Add [U07.1] COVID     12/20/2023  2:45 PM Jj Chaparro Add [I10] Hypertension     12/20/2023  2:45 PM Jj Chaparro Add [D49.0] Parotid tumor     12/20/2023  2:45 PM Jj Chaparro Modify [R93.0] Abnormal head CT     12/20/2023  2:45 PM Jj Chaparro Modify [I63.9] Stroke (HCC)           ED Disposition       ED Disposition   Admit    Condition   Stable    Date/Time   Wed Dec 20, 2023 1137    Comment   --             Follow-up Information    None         Patient's Medications   Discharge Prescriptions    No medications on file       No discharge procedures on file.    PDMP Review       None            ED Provider  Electronically Signed by             Jj Chaparro MD  12/20/23 7240

## 2023-12-20 NOTE — ASSESSMENT & PLAN NOTE
68-year-old female with seizure disorder on carbamazepine, status post right temporal mass resection, schizoaffective disorder, hypertension, who presented with altered mental status. Patient presented with 3-day history of increased difficulty performing ADLs and walking independently with apparent confusion, reported slurred and tangential speech. Exam limited but significant for mixed aphasia.    - CT head demonstrated small left posterior temporoparietal cortical hypodensity with hyperdense peripheral MCA vessel coursing through the parenchyma may represent acute to subacute infarction in the appropriate clinical context.  There was also chronic right temporal postsurgical encephalomalacia.  - CTA head/neck with no LVO.  - Blood pressure on presentation 164/83.  - Patient AP/AC negative baseline.  - Patient on carbamazepine 400 mg twice daily at baseline.    MRI brain without contrast was completed and demonstrates moderate-sized acute left middle cerebral artery infarction. Etiology of infarct is unclear at this time, concern for embolic source of infarct.    Plan:  -Continue on acute ischemic stroke pathway.  - Echocardiogram completed, EF 65%, no diagnostic regional wall motion abnormality identified. Diastolic function mildly abnormal. Right ventricular cavity size is mildly dilated. No PFO detected at rest, with cough, or with Valsalva using agitated saline contrast. Mild mitral regurgitation. Mild tricuspid regurgitation.   - Recommend HILLARY for further evaluation due to concern for embolic etiology of stroke. Patient's sister at bedside and reviewed this recommendation. She notes that patient was scheduled to have parotid surgery in November 2023 but was cancelled the day of surgery due to anesthesiology concerns for cardiac clearance. Discussed with internal medicine team and will consult cardiology team for further evaluation and consideration for HILLARY.  - Hemoglobin A1c reviewed, 5.3.   - Lipid panel  reviewed, total cholesterol 131, triglycerides 57, HDL 61, LDL 59.   - Recommend check TSH.   - Continue to monitor on telemetry.  - s/p loading  mg x 1.   - Continue on DAPT with ASA 81 mg QD and Plavix 75 mg QD.  - Continue atorvastatin 40 mg.  - Patient's sister reports patient complaining of LE pain and swelling at times L>R. Will check venous duplex B/L LE.  - PT/OT/speech therapy.  - Can normalize BP with goal of normotension.   - Goal normothermia and euglycemia.   - Stroke education.   - Monitor exam and notify with changes.

## 2023-12-21 LAB
CHOLEST SERPL-MCNC: 131 MG/DL
EST. AVERAGE GLUCOSE BLD GHB EST-MCNC: 105 MG/DL
HBA1C MFR BLD: 5.3 %
HDLC SERPL-MCNC: 61 MG/DL
LDLC SERPL CALC-MCNC: 59 MG/DL (ref 0–100)
TRIGL SERPL-MCNC: 57 MG/DL

## 2023-12-21 PROCEDURE — 92610 EVALUATE SWALLOWING FUNCTION: CPT

## 2023-12-21 PROCEDURE — 97163 PT EVAL HIGH COMPLEX 45 MIN: CPT

## 2023-12-21 PROCEDURE — 80061 LIPID PANEL: CPT | Performed by: PHYSICIAN ASSISTANT

## 2023-12-21 PROCEDURE — 83036 HEMOGLOBIN GLYCOSYLATED A1C: CPT | Performed by: PHYSICIAN ASSISTANT

## 2023-12-21 PROCEDURE — 97166 OT EVAL MOD COMPLEX 45 MIN: CPT

## 2023-12-21 PROCEDURE — 99232 SBSQ HOSP IP/OBS MODERATE 35: CPT | Performed by: INTERNAL MEDICINE

## 2023-12-21 RX ADMIN — ENOXAPARIN SODIUM 40 MG: 40 INJECTION SUBCUTANEOUS at 09:30

## 2023-12-21 RX ADMIN — ATORVASTATIN CALCIUM 40 MG: 40 TABLET, FILM COATED ORAL at 17:43

## 2023-12-21 RX ADMIN — OLANZAPINE 40 MG: 10 TABLET, FILM COATED ORAL at 22:23

## 2023-12-21 RX ADMIN — ASPIRIN 81 MG CHEWABLE TABLET 81 MG: 81 TABLET CHEWABLE at 09:30

## 2023-12-21 RX ADMIN — CARBAMAZEPINE 400 MG: 200 TABLET ORAL at 22:23

## 2023-12-21 NOTE — PROGRESS NOTES
[facility]  Progress Note  Name: Elvi Castillo I  MRN: [id number]  Unit/Bed#: E4 -01 I Date of Admission: 12/20/2023   Date of Service: 12/21/2023 I Hospital Day: 1    Assessment/Plan   * Stroke-like symptoms  Assessment & Plan  Presented to the ED with 3 days of confusion and slurred speech  CTA head with age-indeterminate hypodensity in left middle cerebral artery territory in temporoparietal junction with focal hyperdense vessel sign indicative of vascular occlusion.  Additionally mild local sulcal effacement favoring a more recent/acute/subacute event  Admitted on stroke pathway  Received aspirin and Plavix bolus  Started on aspirin 81 mg daily and atorvastatin 40 mg  Allow for permissive hypertension-hold home amlodipine  Monitor on telemetry send neurochecks  Obtain MRI brain and echo- currently pending   Neurology consulted  Appreciate pt/ot eval   Appreciate speech eval    History of brain tumor  Assessment & Plan  Per sister, pt has a history of a brain tumor that was resected in the patient's 20s  Currently patient lives with her sister who provides most care and assistance  At baseline patient is able to perform simple tasks like dress herself but does have ST memory loss  Worsened confusion and speech difficulties in the setting of acute CVA  Currently on CVA pathway    COVID-19  Assessment & Plan  Presented to the hospital with slurred speech and confusion x 3 days  Found to be COVID-positive  Asymptomatic and not hypoxic  CXR without infiltrate or consolidation  Therefore hold off on remdesivir      Parotid tumor  Assessment & Plan  CT head performed revealing 4.6 cm left parotid mass which was previously imaged and is stable from August  Has seen ENT as an outpatient had biopsy performed 9/18/2023-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  For removal on 11/8/2023 however there was an issue with her preoperative EGD and surgery was not performed  Was scheduled to undergo  additional cardiac workup prior to rescheduling removal of tumor  Ongoing outpt followup with ENT    Hypokalemia  Assessment & Plan  Replaced. Check labs in am.     Seizure disorder (HCC)  Assessment & Plan  Maintained on Tegretol for 100 mg twice daily    Schizoaffective disorder (HCC)  Assessment & Plan  Continue home regimen of Zyprexa 40 mg at bed    Hypertension  Assessment & Plan  Presenting with hypertension urgency in the setting of acute stroke  Home regimen amlodipine 5 mg daily  Hold amlodipine in the setting of CVA pathway to allow for permissive hypertension               VTE Pharmacologic Prophylaxis:   lovenox     Mobility:   Basic Mobility Inpatient Raw Score: 21  JH-HLM Goal: 6: Walk 10 steps or more  JH-HLM Achieved: 7: Walk 25 feet or more      Patient Centered Rounds:  discussed with nursing staff     Discussions with Specialists or Other Care Team Provider: discussed with speech     Education and Discussions with Family / Patient:attempted to call her sister but now answer     Total Time Spent on Date of Encounter in care of patient: 40 mins. This time was spent on one or more of the following: performing physical exam; counseling and coordination of care; obtaining or reviewing history; documenting in the medical record; reviewing/ordering tests, medications or procedures; communicating with other healthcare professionals and discussing with patient's family/caregivers.    Current Length of Stay: 1 day(s)  Current Patient Status: Inpatient     Discharge Plan: Anticipate discharge in 48-72 hrs to d    Code Status: Level 3 - DNAR and DNI    Subjective:   Pt seen and examined. She had intermittent garbled speech. No problems were noted.     Objective:     Vitals:   Temp (24hrs), Av.1 °F (36.7 °C), Min:97.6 °F (36.4 °C), Max:98.5 °F (36.9 °C)    Temp:  [97.6 °F (36.4 °C)-98.5 °F (36.9 °C)] 97.7 °F (36.5 °C)  HR:  [62-93] 87  Resp:  [16-18] 18  BP: (127-169)/(63-88) 132/63  SpO2:  [96 %-99 %]  98 %  Body mass index is 20.97 kg/m².     Input and Output Summary (last 24 hours):   No intake or output data in the 24 hours ending 12/21/23 1753    Physical Exam:   Physical Exam  Constitutional:       Appearance: Normal appearance.   HENT:      Head: Normocephalic and atraumatic.   Eyes:      Extraocular Movements: Extraocular movements intact.      Pupils: Pupils are equal, round, and reactive to light.   Cardiovascular:      Rate and Rhythm: Normal rate and regular rhythm.      Heart sounds: No murmur heard.     No friction rub. No gallop.   Pulmonary:      Effort: Pulmonary effort is normal. No respiratory distress.      Breath sounds: Normal breath sounds. No wheezing, rhonchi or rales.   Abdominal:      General: Bowel sounds are normal. There is no distension.      Palpations: Abdomen is soft.      Tenderness: There is no abdominal tenderness. There is no guarding or rebound.   Neurological:      Mental Status: She is alert.      Comments: Intermittent garbled speech in which she was unable to answer all the orientation questions but she was able to follow all commands           Additional Data:     Labs:  Results from last 7 days   Lab Units 12/20/23  0949   WBC Thousand/uL 5.43   HEMOGLOBIN g/dL 11.9   HEMATOCRIT % 36.2   PLATELETS Thousands/uL 199   NEUTROS PCT % 80*   LYMPHS PCT % 7*   MONOS PCT % 12   EOS PCT % 0     Results from last 7 days   Lab Units 12/20/23  0949   SODIUM mmol/L 137   POTASSIUM mmol/L 3.3*   CHLORIDE mmol/L 103   CO2 mmol/L 31   BUN mg/dL 19   CREATININE mg/dL 0.73   ANION GAP mmol/L 3   CALCIUM mg/dL 8.7   ALBUMIN g/dL 3.6   TOTAL BILIRUBIN mg/dL 0.33   ALK PHOS U/L 85   ALT U/L 19   AST U/L 20   GLUCOSE RANDOM mg/dL 71             Results from last 7 days   Lab Units 12/21/23  0542   HEMOGLOBIN A1C % 5.3           Lines/Drains:  Invasive Devices       Peripheral Intravenous Line  Duration             Peripheral IV 12/20/23 Left Antecubital 1 day                       Telemetry:  Telemetry Orders (From admission, onward)               24 Hour Telemetry Monitoring  Continuous x 24 Hours (Telem)        Question:  Reason for 24 Hour Telemetry  Answer:  TIA/Suspected CVA/ Confirmed CVA                            Imaging: Reviewed radiology reports from this admission including: CT head    Recent Cultures (last 7 days):         Last 24 Hours Medication List:   Current Facility-Administered Medications   Medication Dose Route Frequency Provider Last Rate    aspirin  81 mg Oral Daily Jigna Piger, PA-C      atorvastatin  40 mg Oral QPM Jigna Piger, PA-C      carBAMazepine  400 mg Oral HS Jigna Piger, PA-C      enoxaparin  40 mg Subcutaneous Daily Jigna Piger, PA-C      OLANZapine  40 mg Oral HS Jigna Piger, PA-C      ondansetron  4 mg Intravenous Q6H PRN Jigna Piger, PA-C          Today, Patient Was Seen By: Belem Castillo DO

## 2023-12-21 NOTE — ASSESSMENT & PLAN NOTE
Presented to the hospital with slurred speech and confusion x 3 days  Found to be COVID-positive  Asymptomatic and not hypoxic  CXR without infiltrate or consolidation  Therefore hold off on remdesivir

## 2023-12-21 NOTE — ASSESSMENT & PLAN NOTE
Presented to the ED with 3 days of confusion and slurred speech  CTA head with age-indeterminate hypodensity in left middle cerebral artery territory in temporoparietal junction with focal hyperdense vessel sign indicative of vascular occlusion.  Additionally mild local sulcal effacement favoring a more recent/acute/subacute event  Admitted on stroke pathway  Received aspirin and Plavix bolus  Started on aspirin 81 mg daily and atorvastatin 40 mg  Allow for permissive hypertension-hold home amlodipine  Monitor on telemetry send neurochecks  Obtain MRI brain and echo- currently pending   Neurology consulted  Appreciate pt/ot eval   Appreciate speech eval

## 2023-12-21 NOTE — PLAN OF CARE
Problem: Potential for Falls  Goal: Patient will remain free of falls  Description: INTERVENTIONS:  - Educate patient/family on patient safety including physical limitations  - Instruct patient to call for assistance with activity   - Consult OT/PT to assist with strengthening/mobility   - Keep Call bell within reach  - Keep bed low and locked with side rails adjusted as appropriate  - Keep care items and personal belongings within reach  - Initiate and maintain comfort rounds  - Make Fall Risk Sign visible to staff  - Offer Toileting every 3 Hours, in advance of need  - Initiate/Maintain bed alarm  - Obtain necessary fall risk management equipment:   - Apply yellow socks and bracelet for high fall risk patients  - Consider moving patient to room near nurses station  Outcome: Progressing     Problem: PAIN - ADULT  Goal: Verbalizes/displays adequate comfort level or baseline comfort level  Description: Interventions:  - Encourage patient to monitor pain and request assistance  - Assess pain using appropriate pain scale  - Administer analgesics based on type and severity of pain and evaluate response  - Implement non-pharmacological measures as appropriate and evaluate response  - Consider cultural and social influences on pain and pain management  - Notify physician/advanced practitioner if interventions unsuccessful or patient reports new pain  Outcome: Progressing     Problem: INFECTION - ADULT  Goal: Absence or prevention of progression during hospitalization  Description: INTERVENTIONS:  - Assess and monitor for signs and symptoms of infection  - Monitor lab/diagnostic results  - Monitor all insertion sites, i.e. indwelling lines, tubes, and drains  - Monitor endotracheal if appropriate and nasal secretions for changes in amount and color  - [place] appropriate cooling/warming therapies per order  - Administer medications as ordered  - Instruct and encourage patient and family to use good hand hygiene  technique  - Identify and instruct in appropriate isolation precautions for identified infection/condition  Outcome: Progressing  Goal: Absence of fever/infection during neutropenic period  Description: INTERVENTIONS:  - Monitor WBC    Outcome: Progressing     Problem: SAFETY ADULT  Goal: Patient will remain free of falls  Description: INTERVENTIONS:  - Educate patient/family on patient safety including physical limitations  - Instruct patient to call for assistance with activity   - Consult OT/PT to assist with strengthening/mobility   - Keep Call bell within reach  - Keep bed low and locked with side rails adjusted as appropriate  - Keep care items and personal belongings within reach  - Initiate and maintain comfort rounds  - Make Fall Risk Sign visible to staff  - Offer Toileting every 3 Hours, in advance of need  - Initiate/Maintain bed alarm  - Obtain necessary fall risk management equipment:   - Apply yellow socks and bracelet for high fall risk patients  - Consider moving patient to room near nurses station  Outcome: Progressing  Goal: Maintain or return to baseline ADL function  Description: INTERVENTIONS:  -  Assess patient's ability to carry out ADLs; assess patient's baseline for ADL function and identify physical deficits which impact ability to perform ADLs (bathing, care of mouth/teeth, toileting, grooming, dressing, etc.)  - Assess/evaluate cause of self-care deficits   - Assess range of motion  - Assess patient's mobility; develop plan if impaired  - Assess patient's need for assistive devices and provide as appropriate  - Encourage maximum independence but intervene and supervise when necessary  - Involve family in performance of ADLs  - Assess for home care needs following discharge   - Consider OT consult to assist with ADL evaluation and planning for discharge  - Provide patient education as appropriate  Outcome: Progressing  Goal: Maintains/Returns to pre admission functional  level  Description: INTERVENTIONS:  - Perform AM-PAC 6 Click Basic Mobility/ Daily Activity assessment daily.  - Set and communicate daily mobility goal to care team and patient/family/caregiver.   - Collaborate with rehabilitation services on mobility goals if consulted  - Perform Range of Motion 3 times a day.  - Reposition patient every 2 hours.  - Dangle patient 3 times a day  - Stand patient 3 times a day  - Ambulate patient 3 times a day  - Out of bed to chair 3 times a day   - Out of bed for meals 3 times a day  - Out of bed for toileting  - Record patient progress and toleration of activity level   Outcome: Progressing     Problem: DISCHARGE PLANNING  Goal: Discharge to home or other facility with appropriate resources  Description: INTERVENTIONS:  - Identify barriers to discharge w/patient and caregiver  - Arrange for needed discharge resources and transportation as appropriate  - Identify discharge learning needs (meds, wound care, etc.)  - Arrange for interpretive services to assist at discharge as needed  - Refer to Case Management Department for coordinating discharge planning if the patient needs post-hospital services based on physician/advanced practitioner order or complex needs related to functional status, cognitive ability, or social support system  Outcome: Progressing     Problem: Knowledge Deficit  Goal: Patient/family/caregiver demonstrates understanding of disease process, treatment plan, medications, and discharge instructions  Description: Complete learning assessment and assess knowledge base.  Interventions:  - Provide teaching at level of understanding  - Provide teaching via preferred learning methods  Outcome: Progressing     Problem: Neurological Deficit  Goal: Neurological status is stable or improving  Description: Interventions:  - Monitor and assess patient's level of consciousness, motor function, sensory function, and level of assistance needed for ADLs.   - Monitor and report  changes from baseline. Collaborate with interdisciplinary team to initiate plan and implement interventions as ordered.   - Provide and maintain a safe environment.  - Consider seizure precautions.  - Consider fall precautions.  - Consider aspiration precautions.  - Consider bleeding precautions.  Outcome: Progressing     Problem: Activity Intolerance/Impaired Mobility  Goal: Mobility/activity is maintained at optimum level for patient  Description: Interventions:  - Assess and monitor patient  barriers to mobility and need for assistive/adaptive devices.  - Assess patient's emotional response to limitations.  - Collaborate with interdisciplinary team and initiate plans and interventions as ordered.  - Encourage independent activity per ability.  - Maintain proper body alignment.  - Perform active/passive rom as tolerated/ordered.  - Plan activities to conserve energy.  - Turn patient as appropriate  Outcome: Progressing     Problem: Communication Impairment  Goal: Ability to express needs and understand communication  Description: Assess patient's communication skills and ability to understand information.  Patient will demonstrate use of effective communication techniques, alternative methods of communication and understanding even if not able to speak.     - Encourage communication and provide alternate methods of communication as needed.  - Collaborate with case management/ for discharge needs.  - Include patient/family/caregiver in decisions related to communication.  Outcome: Progressing     Problem: Potential for Aspiration  Goal: Non-ventilated patient's risk of aspiration is minimized  Description: Assess and monitor vital signs, respiratory status, and labs (WBC).  Monitor for signs of aspiration (tachypnea, cough, rales, wheezing, cyanosis, fever).    - Assess and monitor patient's ability to swallow.  - Place patient up in chair to eat if possible.  - HOB up at 90 degrees to eat if unable  to get patient up into chair.  - Supervise patient during oral intake.   - Instruct patient/ family to take small bites.  - Instruct patient/ family to take small single sips when taking liquids.  - Follow patient-specific strategies generated by speech pathologist.  Outcome: Progressing  Goal: Ventilated patient's risk of aspiration is minimized  Description: Assess and monitor vital signs, respiratory status, airway cuff pressure, and labs (WBC).  Monitor for signs of aspiration (tachypnea, cough, rales, wheezing, cyanosis, fever).    - Elevate head of bed 30 degrees if patient has tube feeding.  - Monitor tube feeding.  Outcome: Progressing     Problem: Nutrition  Goal: Nutrition/Hydration status is improving  Description: Monitor and assess patient's nutrition/hydration status for malnutrition (ex- brittle hair, bruises, dry skin, pale skin and conjunctiva, muscle wasting, smooth red tongue, and disorientation). Collaborate with interdisciplinary team and initiate plan and interventions as ordered.  Monitor patient's weight and dietary intake as ordered or per policy. Utilize nutrition screening tool and intervene per policy. Determine patient's food preferences and provide high-protein, high-caloric foods as appropriate.     - Assist patient with eating.  - Allow adequate time for meals.  - Encourage patient to take dietary supplement as ordered.  - Collaborate with clinical nutritionist.  - Include patient/family/caregiver in decisions related to nutrition.  Outcome: Progressing

## 2023-12-21 NOTE — PHYSICAL THERAPY NOTE
PHYSICAL THERAPY EVALUATION  NAME:  Elvi Castillo  DATE: 12/21/23    AGE:   68 y.o.  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]  COVID [U07.1]    Past Medical History:   Diagnosis Date    History of drug overdose 2005    Hypertension     Memory loss     Schizophrenia (HCC)      Length Of Stay: 1  Performed at least 2 patient identifiers during session: Name and Birthday    PHYSICAL THERAPY EVALUATION :    12/21/23 1440   PT Last Visit   PT Visit Date 12/21/23   Note Type   Note type Evaluation   Pain Assessment   Pain Assessment Tool 0-10   Pain Score No Pain   Restrictions/Precautions   Weight Bearing Precautions Per Order No   Other Precautions Contact/isolation;Airborne/isolation;Chair Alarm;Bed Alarm;Cognitive;Fall Risk;Multiple lines   Home Living   Type of Home House   Home Layout One level;Other (Comment)  (2 ROBERT)   Bathroom Accessibility Accessible   Additional Comments PT limited historian. Reports resides with sister in one story home with 2 ROBERT. Sister provides assistance.   Prior Function   Level of [place] Needs assistance with ADLs;Needs assistance with IADLS;Needs assistance with functional mobility   Lives With Family  (Sister)   Receives Help From Family   IADLs Family/Friend/Other provides transportation;Family/Friend/Other provides meals;Family/Friend/Other provides medication management   Falls in the last 6 months 0  (Pt denies)   Vocational On disability   Comments Reports independence for ambulation and ADLS-per pt. CM notes indicate assistance required for ADLS. Ambulates with walker at baseline, however is unable to do this at present. Sister expresses concern about being able to continually care for the patient given her current medical condition.   General   Additional Pertinent History Elvi Castillo is a 68 y.o. female with a PMH of schizophrenia, Short term memory loss,  hypertension, history of drug overdose, history of brain tumor with surgical resection, history of parotid tumor. She presents with confusion and slurred speech x 3 days. Most history was obtained from sister via telephone as patient does have a difficulty with her speech. Patient lives with her sister and at baseline is able to perform daily ADLs and dress herself. She has been having worsening confusion and has been unable to perform these simple tasks. Sister was concerned and brought her to the ER for further evaluation.In the ER she was found to have an abnormal CAT scan of her brain with concern for stroke and was placed on the stroke pathway. She was also found to be COVID-positive.   Family/Caregiver Present No   Cognition   Overall Cognitive Status Impaired   Arousal/Participation Responsive   Attention Attends with cues to redirect   Orientation Level Oriented to person;Disoriented to place;Disoriented to situation;Disoriented to time   Memory Decreased recall of precautions;Decreased recall of recent events;Decreased short term memory   Following Commands Follows one step commands with increased time or repetition   Comments BEhavioral component noted with limited desire to participate in session.   RUE Assessment   RUE Assessment WFL  (4-/5 throughout)   LUE Assessment   LUE Assessment WFL  (4-/5 throughout)   RLE Assessment   RLE Assessment WFL   LLE Assessment   LLE Assessment WFL   Vision-Basic Assessment   Current Vision Wears glasses all the time   Coordination   Movements are Fluid and Coordinated 1   Light Touch   RLE Light Touch Grossly intact   LLE Light Touch Grossly intact   Bed Mobility   Rolling R 6  Modified independent   Additional items Bedrails   Rolling L 6  Modified independent   Additional items Bedrails   Supine to Sit 5  Supervision   Additional items HOB elevated;Bedrails   Sit to Supine 5  Supervision   Additional items HOB elevated;Bedrails   Transfers   Sit to Stand 5   Supervision   Additional items Impulsive;Increased time required;Verbal cues  (Patient needed signifianct encouragement to participate.)   Stand to Sit 5  Supervision   Additional items Impulsive   Ambulation/Elevation   Gait pattern Decreased foot clearance;Short stride   Gait Assistance 4  Minimal assist   Additional items Assist x 1   Assistive Device Other (Comment)  (HHA)   Distance 3'   Stair Management Assistance Not tested   Balance   Static Sitting Fair   Dynamic Sitting Fair -   Static Standing Fair -   Dynamic Standing Poor +   Ambulatory Poor +   Activity Tolerance   Activity Tolerance Patient tolerated treatment well;Treatment limited secondary to medical complications (Comment)   Medical Staff Made Aware Spoke with OT prior to session   Nurse Made Aware yes   Assessment   Prognosis Fair   Problem List Decreased endurance;Decreased mobility;Decreased cognition;Decreased safety awareness   Barriers to Discharge None   Goals   Patient Goals None stated   STG Expiration Date 12/28/23   Plan   Treatment/Interventions Functional transfer training;LE strengthening/ROM;Elevations;Therapeutic exercise;Patient/family training;Gait training;Spoke to nursing;OT   PT Frequency 2-3x/wk   Discharge Recommendation   Rehab Resource Intensity Level, PT III (Minimum Resource Intensity)   Equipment Recommended Other (Comment)  (None at this time)   AM-PAC Basic Mobility Inpatient   Turning in Flat Bed Without Bedrails 4   Lying on Back to Sitting on Edge of Flat Bed Without Bedrails 4   Moving Bed to Chair 3   Standing Up From Chair Using Arms 4   Walk in Room 3   Climb 3-5 Stairs With Railing 3   Basic Mobility Inpatient Raw Score 21   Basic Mobility Standardized Score 45.55   End of Consult   Patient Position at End of Consult Supine;Bed/Chair alarm activated;All needs within reach   End of Consult Comments Patient stable upon conclusion of session.       (Please find full objective findings from PT assessment regarding  body systems outlined above).     Assessment: Pt is a 68 y.o. female seen for PT evaluation s/p admit to [facility] on 12/20/2023 w/ Stroke-like symptoms. CAT scan of her brain  was abnormal and placed on Stroke pathway. Found to be positive for COVID upon admission. Order placed for PT.  Prior to admission, pt ambulated community distances and elevations, lived with sister, and was walking with a walker and supervision of sister . Upon evaluation: Pt requires  modified independent assistance for bed mobility, supervision of 1 assistance for transfers, and min of 1 assistance for ambulation with HHA for 3' due to patient's decreased engagement in the session .  Pt's clinical presentation is currently unstable/unpredictable given the functional mobility deficits above, especially decreased endurance, gait deviations, decreased activity tolerance, and decreased safety awareness, coupled with fall risks including impaired balance and decreased cognition, and combined with medical complications of hypertension , abnormal potassium values, and impulsivity during admission.  Pt to benefit from continued skilled PT tx while in hospital and upon DC to address deficits as defined above and maximize level of functional mobility. The patient's AM-PAC Basic Mobility Inpatient Short Form Raw Score is 21. A Raw score greater than 16 suggests the patient may benefit from discharge to home. Please also refer to the recommendation of the Physical Therapist for safe discharge planning. From PT/mobility standpoint, recommendation at time of d/c would be  level 3 services  pending progress in order to maximize pt's functional independence and consistency w/ mobility in order to facilitate return to PLOF.  Recommend ther ex next 1-2 sessions and HHA for gait in room .      Goals: In 7 days pt will demonstrate: bed mobility (I) for home function OOB, sit<>stand and functional transfers mod (I) w/ RW for home function, gait  training 100ft mod (I) w/ RW for home distances, 1 step mod (I) w/ RW for home entrance, improve BLE by 1/2 grade strength to optimize functional mobility, improve balance by 1 grade to decrease fall risk, improve activity tolerance to >45 minutes w/o rest to improve functional endurance for home, pt and family education on PT risk, role, benefits, POC, goals, and recommendations to optimize patient outcomes, patient functional, optimize LOS and promote discharge to least restrictive environment.    The following objective measures were performed on IE:  Modified [place] 2 (slight disability); AM-PAC 6-Clicks: 21/24.   Comorbidities affecting pt's physical performance at time of assessment include: HTN, limited cognition, and history of brain rumor resection . Personal factors affecting pt at time of IE include: behavioral pattern, inability to perform IADLs, and inability to navigate community distances.     Yamile Estrella, PT, DPT, GCS

## 2023-12-21 NOTE — OCCUPATIONAL THERAPY NOTE
Occupational Therapy Evaluation     Patient Name: Elvi Castillo  Today's Date: 12/21/2023  Problem List  Principal Problem:    Stroke-like symptoms  Active Problems:    Hypertension    Schizoaffective disorder (HCC)    Seizure disorder (HCC)    Hypokalemia    Parotid tumor    COVID-19    History of brain tumor    Past Medical History  Past Medical History:   Diagnosis Date    History of drug overdose 2005    Hypertension     Memory loss     Schizophrenia (HCC)      Past Surgical History  Past Surgical History:   Procedure Laterality Date    BRAIN SURGERY      Brain Tumor surgery - age late 20's done @ [facility]    CARDIAC CATHETERIZATION  2015    HIP ARTHROPLASTY Right 01/15/2021    Procedure: HemiARTHROPLASTY HIP bipolar;  Surgeon: Mahesh Finch MD;  Location: [facility];  Service: Orthopedics    [place] HEMIARTHROPLASTY HIP PARTIAL Right 01/15/2021    Procedure: HEMIARTHROPLASTY HIP (BIPOLAR);  Surgeon: Mahesh Finch MD;  Location: [facility];  Service: Orthopedics    US GUIDED LYMPH NODE BIOPSY LEFT  09/18/2023 12/21/23 1048   OT Last Visit   OT Visit Date 12/21/23   Note Type   Note type Evaluation   Pain Assessment   Pain Assessment Tool 0-10   Pain Score No Pain   Restrictions/Precautions   Weight Bearing Precautions Per Order No   Other Precautions Contact/isolation;Airborne/isolation;Cognitive;Chair Alarm;Bed Alarm;Fall Risk;Multiple lines   Home Living   Type of Home House   Home Layout One level   Home Equipment Walker   Additional Comments Pt is poor historian, info on home setup and PLOF obtained via chart. Pt lives with sister in a one level house.   Prior Function   Level of [place] Independent with functional mobility;Needs assistance with ADLs;Needs assistance with IADLS   Lives With Family  (Sister)   Receives Help From Family   IADLs Family/Friend/Other provides transportation;Family/Friend/Other provides meals;Family/Friend/Other provides medication management   Falls in the  last 6 months 0  (Pt denies)   Vocational On disability   Comments At baseline, pt required assist w/ ADLs and IADLs. Per chart: Pt is typically able to complete her ADLs with minimal direction. Mod I for functional transfers/mobility w/ use of RW. (-) . Denies falls PTA.   Lifestyle   Autonomy At baseline, pt required assist w/ ADLs and IADLs. Per chart: Pt is typically able to complete her ADLs with minimal direction. Mod I for functional transfers/mobility w/ use of RW. (-) . Denies falls PTA.   Reciprocal Relationships Sister   Service to Others On disability   ADL   Where Assessed Edge of bed   Eating Assistance 7  Independent   Grooming Assistance 5  Supervision/Setup   UB Bathing Assistance 5  Supervision/Setup   LB Bathing Assistance 4  Minimal Assistance   UB Dressing Assistance 5  Supervision/Setup   LB Dressing Assistance 4  Minimal Assistance   Toileting Assistance  4  Minimal Assistance   Functional Assistance 4  Minimal Assistance   Bed Mobility   Supine to Sit 5  Supervision   Additional items HOB elevated;Bedrails;Increased time required   Sit to Supine 5  Supervision   Additional items Increased time required;Verbal cues;HOB elevated   Additional Comments Pt lying supine with bed alarm activated at end of session. Call bell and phone within reach. All needs met and pt reports no further questions for OT at this time.   Transfers   Sit to Stand 5  Supervision   Additional items Impulsive;Verbal cues   Stand to Sit 5  Supervision   Additional items Increased time required;Verbal cues   Toilet transfer 5  Supervision   Additional items Increased time required;Verbal cues;Standard toilet  (grab bar use)   Additional Comments Cues for safe technique and hand placement. +Impulsive   Functional Mobility   Functional Mobility 4  Minimal assistance   Additional Comments Assist x1; CGA-Close supervision. Impulsive, cues for pacing   Additional items Rolling walker   Balance   Static Sitting Fair    Dynamic Sitting Fair -   Static Standing Fair -   Dynamic Standing Poor +   Ambulatory Poor +   Activity Tolerance   Activity Tolerance Patient tolerated treatment well;Treatment limited secondary to medical complications (Comment)   Medical Staff Made Aware JOHN Hauser   Nurse Made Aware yes   RUE Assessment   RUE Assessment WFL  (4-/5 throughout)   LUE Assessment   LUE Assessment WFL  (4-/5 throughout)   Hand Function   Gross Motor Coordination Functional   Fine Motor Coordination Functional   Perception   Inattention/Neglect Appears intact   Cognition   Overall Cognitive Status Impaired   Arousal/Participation Alert;Cooperative   Attention Attends with cues to redirect   Orientation Level Oriented to person;Disoriented to place;Disoriented to situation;Disoriented to time   Memory Decreased recall of precautions;Decreased recall of recent events;Decreased short term memory   Following Commands Follows one step commands with increased time or repetition   Comments nonsensical speech noted at times in evaluation. Pt followed commands w/ increased time at start of evaluation, however inconsistent command following as session progresed. ?behavioral component at pt closed eyes and rolled away from therapist.   Assessment   Limitation Decreased ADL status;Decreased UE strength;Decreased Safe judgement during ADL;Decreased cognition;Decreased endurance;Decreased self-care trans;Decreased high-level ADLs   Prognosis Fair   Assessment Pt is a 68 y.o. female seen for OT evaluation s/p adm to [facility] on 12/20/2023 w/ Stroke-like symptoms . CT head: Small left posterior temporoparietal cortical hypodensity with hyperdense peripheral MCA vessel coursing through the parenchyma may represent acute to subacute infarction in the appropriate clinical context. Comorbidities affecting pt’s functional performance include a significant PMH of schizophrenia, Short term memory loss, hypertension, history of drug overdose,  history of brain tumor with surgical resection, history of parotid tumor. Pt with active OT orders and activity orders for Up and OOB as tolerated. Pt is poor historian, info on home setup and PLOF obtained via chart. Pt lives with sister in a one level house. At baseline, pt required assist w/ ADLs and IADLs. Per chart: Pt is typically able to complete her ADLs with minimal direction. Mod I for functional transfers/mobility w/ use of RW. (-) . Denies falls PTA. Upon evaluation, pt currently requires Supervision for UB ADLs, Min A for LB ADLs, Min A for toileting, Supervision for bed mobility, Supervision for transfers, and Min A for functional mobility 2* the following deficits impacting occupational performance: decreased strength , decreased balance, decreased activity tolerance, limited functional reach, impaired memory, impaired problem solving, impulsivity, and decreased safety awareness. These impairments, as well at pt’s personal factors of: difficulty performing ADLs, difficulty performing transfers/mobility, limited insight into deficits, fall risk , and functional decline  limit pt’s ability to safely engage in all baseline areas of occupation. Pt to continue to benefit from continued acute OT services during hospital stay to address defined deficits and to maximize level of functional independence in the following Occupational Performance areas: grooming, bathing/shower, toilet hygiene, dressing, health maintenance, functional mobility, community mobility, clothing management, and social participation. The patient's raw score on the -PAC Daily Activity Inpatient Short Form is 19. A raw score of greater than or equal to 19 suggests the patient may benefit from discharge to home. Please refer to the recommendation of the Occupational Therapist for safe discharge planning. OT will continue to follow pt 2-3x/wk to address the following goals to  w/in 10-14 days:   Goals   Patient Goals To use  the bathroom   LTG Time Frame 10-14   Long Term Goal Please refer to LTGs listed below   Plan   Treatment Interventions ADL retraining;Functional transfer training;UE strengthening/ROM;Endurance training;Patient/family training;Equipment evaluation/education;Compensatory technique education;Continued evaluation;Activityengagement   Goal Expiration Date 01/04/24   OT Treatment Day 0   OT Frequency 2-3x/wk   Discharge Recommendation   Rehab Resource Intensity Level, OT III (Minimum Resource Intensity)  (pending level of assistance in home environment)   AM-PAC Daily Activity Inpatient   Lower Body Dressing 3   Bathing 3   Toileting 3   Upper Body Dressing 3   Grooming 3   Eating 4   Daily Activity Raw Score 19   Daily Activity Standardized Score (Calc for Raw Score >=11) 40.22   AM-PAC Applied Cognition Inpatient   Following a Speech/Presentation 3   Understanding Ordinary Conversation 3   Taking Medications 2   Remembering Where Things Are Placed or Put Away 2   Remembering List of 4-5 Errands 2   Taking Care of Complicated Tasks 2   Applied Cognition Raw Score 14   Applied Cognition Standardized Score 32.02     GOALS    Pt will improve activity tolerance to G for min 30 min txment sessions for increase engagement in functional tasks    Pt will complete bed mobility at a Mod I level w/ G balance/safety demonstrated to decrease caregiver assistance required     Pt will complete UB dressing/self care w/ mod I using adaptive device and DME as needed     Pt will complete LB dressing/self care w/ mod I using adaptive device and DME as needed    Pt will complete toileting w/ mod I w/ G hygiene/thoroughness using DME as needed    Pt will improve functional transfers to Mod I on/off all surfaces using DME as needed w/ G balance/safety     Pt will improve functional mobility during ADL/IADL/leisure tasks to Mod I using DME as needed w/ G balance/safety     Pt will be attentive 100% of the time during ongoing cognitive  assessment w/ G participation to assist w/ safe d/c planning/recommendations    Pt will demonstrate G carryover of pt/caregiver education and training as appropriate w/o cues w/ good tolerance to increase safety during functional tasks    Pt will increase BUE strength by 1MM grade via AROM exercises to increase independence in ADLs and transfers    Pt will increase standing tolerance to 5-8 mins with Fair+ dynamic standing balance to increase safety during participation in ADLs       Kenisha Botello, OTR/L

## 2023-12-21 NOTE — PLAN OF CARE
Problem: PHYSICAL THERAPY ADULT  Goal: Performs mobility at highest level of function for planned discharge setting.  See evaluation for individualized goals.  Description: Treatment/Interventions: Functional transfer training, LE strengthening/ROM, Elevations, Therapeutic exercise, Patient/family training, Gait training, Spoke to nursing, OT  Equipment Recommended: Other (Comment) (None at this time)       See flowsheet documentation for full assessment, interventions and recommendations.  Note: Prognosis: Fair  Problem List: Decreased endurance, Decreased mobility, Decreased cognition, Decreased safety awareness  Assessment: Pt is a 68 y.o. female seen for PT evaluation s/p admit to [facility] on 12/20/2023 w/ Stroke-like symptoms. CAT scan of her brain  was abnormal and placed on Stroke pathway. Found to be positive for COVID upon admission. Order placed for PT.  Prior to admission, pt ambulated community distances and elevations, lived with sister, and was walking with a walker and supervision of sister . Upon evaluation: Pt requires  modified independent assistance for bed mobility, supervision of 1 assistance for transfers, and min of 1 assistance for ambulation with HHA for 3' due to patient's decreased engagement in the session .  Pt's clinical presentation is currently unstable/unpredictable given the functional mobility deficits above, especially decreased endurance, gait deviations, decreased activity tolerance, and decreased safety awareness, coupled with fall risks including impaired balance and decreased cognition, and combined with medical complications of hypertension , abnormal potassium values, and impulsivity during admission.  Pt to benefit from continued skilled PT tx while in hospital and upon DC to address deficits as defined above and maximize level of functional mobility. The patient's AM-PAC Basic Mobility Inpatient Short Form Raw Score is 21. A Raw score greater than 16 suggests  the patient may benefit from discharge to home. Please also refer to the recommendation of the Physical Therapist for safe discharge planning. From PT/mobility standpoint, recommendation at time of d/c would be  level 3 services  pending progress in order to maximize pt's functional independence and consistency w/ mobility in order to facilitate return to PLOF.  Recommend ther ex next 1-2 sessions and HHA for gait in room .  Barriers to Discharge: None     Rehab Resource Intensity Level, PT: III (Minimum Resource Intensity)    See flowsheet documentation for full assessment.

## 2023-12-21 NOTE — PLAN OF CARE
Problem: Potential for Falls  Goal: Patient will remain free of falls  Description: INTERVENTIONS:  - Educate patient/family on patient safety including physical limitations  - Instruct patient to call for assistance with activity   - Consult OT/PT to assist with strengthening/mobility   - Keep Call bell within reach  - Keep bed low and locked with side rails adjusted as appropriate  - Keep care items and personal belongings within reach  - Initiate and maintain comfort rounds  - Make Fall Risk Sign visible to staff  - Offer Toileting every 3 Hours, in advance of need  - Initiate/Maintain bed alarm  - Obtain necessary fall risk management equipment: alarm   - Apply yellow socks and bracelet for high fall risk patients  - Consider moving patient to room near nurses station  Outcome: Progressing     Problem: PAIN - ADULT  Goal: Verbalizes/displays adequate comfort level or baseline comfort level  Description: Interventions:  - Encourage patient to monitor pain and request assistance  - Assess pain using appropriate pain scale  - Administer analgesics based on type and severity of pain and evaluate response  - Implement non-pharmacological measures as appropriate and evaluate response  - Consider cultural and social influences on pain and pain management  - Notify physician/advanced practitioner if interventions unsuccessful or patient reports new pain  Outcome: Progressing     Problem: INFECTION - ADULT  Goal: Absence or prevention of progression during hospitalization  Description: INTERVENTIONS:  - Assess and monitor for signs and symptoms of infection  - Monitor lab/diagnostic results  - Monitor all insertion sites, i.e. indwelling lines, tubes, and drains  - Monitor endotracheal if appropriate and nasal secretions for changes in amount and color  - [place] appropriate cooling/warming therapies per order  - Administer medications as ordered  - Instruct and encourage patient and family to use good hand hygiene  technique  - Identify and instruct in appropriate isolation precautions for identified infection/condition  Outcome: Progressing  Goal: Absence of fever/infection during neutropenic period  Description: INTERVENTIONS:  - Monitor WBC    Outcome: Progressing     Problem: SAFETY ADULT  Goal: Patient will remain free of falls  Description: INTERVENTIONS:  - Educate patient/family on patient safety including physical limitations  - Instruct patient to call for assistance with activity   - Consult OT/PT to assist with strengthening/mobility   - Keep Call bell within reach  - Keep bed low and locked with side rails adjusted as appropriate  - Keep care items and personal belongings within reach  - Initiate and maintain comfort rounds  - Make Fall Risk Sign visible to staff  - Offer Toileting every 3 Hours, in advance of need  - Initiate/Maintain bed alarm  - Obtain necessary fall risk management equipment: alarm   - Apply yellow socks and bracelet for high fall risk patients  - Consider moving patient to room near nurses station  Outcome: Progressing  Goal: Maintain or return to baseline ADL function  Description: INTERVENTIONS:  -  Assess patient's ability to carry out ADLs; assess patient's baseline for ADL function and identify physical deficits which impact ability to perform ADLs (bathing, care of mouth/teeth, toileting, grooming, dressing, etc.)  - Assess/evaluate cause of self-care deficits   - Assess range of motion  - Assess patient's mobility; develop plan if impaired  - Assess patient's need for assistive devices and provide as appropriate  - Encourage maximum independence but intervene and supervise when necessary  - Involve family in performance of ADLs  - Assess for home care needs following discharge   - Consider OT consult to assist with ADL evaluation and planning for discharge  - Provide patient education as appropriate  Outcome: Progressing  Goal: Maintains/Returns to pre admission functional  level  Description: INTERVENTIONS:  - Perform AM-PAC 6 Click Basic Mobility/ Daily Activity assessment daily.  - Set and communicate daily mobility goal to care team and patient/family/caregiver.   - Collaborate with rehabilitation services on mobility goals if consulted  - Perform Range of Motion 3 times a day.  - Reposition patient every 3 hours.  - Dangle patient 3 times a day  - Stand patient 3 times a day  - Ambulate patient 3 times a day  - Out of bed to chair 3 times a day   - Out of bed for meals 3 times a day  - Out of bed for toileting  - Record patient progress and toleration of activity level   Outcome: Progressing     Problem: DISCHARGE PLANNING  Goal: Discharge to home or other facility with appropriate resources  Description: INTERVENTIONS:  - Identify barriers to discharge w/patient and caregiver  - Arrange for needed discharge resources and transportation as appropriate  - Identify discharge learning needs (meds, wound care, etc.)  - Arrange for interpretive services to assist at discharge as needed  - Refer to Case Management Department for coordinating discharge planning if the patient needs post-hospital services based on physician/advanced practitioner order or complex needs related to functional status, cognitive ability, or social support system  Outcome: Progressing     Problem: Knowledge Deficit  Goal: Patient/family/caregiver demonstrates understanding of disease process, treatment plan, medications, and discharge instructions  Description: Complete learning assessment and assess knowledge base.  Interventions:  - Provide teaching at level of understanding  - Provide teaching via preferred learning methods  Outcome: Progressing     Problem: Neurological Deficit  Goal: Neurological status is stable or improving  Description: Interventions:  - Monitor and assess patient's level of consciousness, motor function, sensory function, and level of assistance needed for ADLs.   - Monitor and report  changes from baseline. Collaborate with interdisciplinary team to initiate plan and implement interventions as ordered.   - Provide and maintain a safe environment.  - Consider seizure precautions.  - Consider fall precautions.  - Consider aspiration precautions.  - Consider bleeding precautions.  Outcome: Progressing     Problem: Activity Intolerance/Impaired Mobility  Goal: Mobility/activity is maintained at optimum level for patient  Description: Interventions:  - Assess and monitor patient  barriers to mobility and need for assistive/adaptive devices.  - Assess patient's emotional response to limitations.  - Collaborate with interdisciplinary team and initiate plans and interventions as ordered.  - Encourage independent activity per ability.  - Maintain proper body alignment.  - Perform active/passive rom as tolerated/ordered.  - Plan activities to conserve energy.  - Turn patient as appropriate  Outcome: Progressing     Problem: Communication Impairment  Goal: Ability to express needs and understand communication  Description: Assess patient's communication skills and ability to understand information.  Patient will demonstrate use of effective communication techniques, alternative methods of communication and understanding even if not able to speak.     - Encourage communication and provide alternate methods of communication as needed.  - Collaborate with case management/ for discharge needs.  - Include patient/family/caregiver in decisions related to communication.  Outcome: Progressing     Problem: Potential for Aspiration  Goal: Non-ventilated patient's risk of aspiration is minimized  Description: Assess and monitor vital signs, respiratory status, and labs (WBC).  Monitor for signs of aspiration (tachypnea, cough, rales, wheezing, cyanosis, fever).    - Assess and monitor patient's ability to swallow.  - Place patient up in chair to eat if possible.  - HOB up at 90 degrees to eat if unable  to get patient up into chair.  - Supervise patient during oral intake.   - Instruct patient/ family to take small bites.  - Instruct patient/ family to take small single sips when taking liquids.  - Follow patient-specific strategies generated by speech pathologist.  Outcome: Progressing  Goal: Ventilated patient's risk of aspiration is minimized  Description: Assess and monitor vital signs, respiratory status, airway cuff pressure, and labs (WBC).  Monitor for signs of aspiration (tachypnea, cough, rales, wheezing, cyanosis, fever).    - Elevate head of bed 30 degrees if patient has tube feeding.  - Monitor tube feeding.  Outcome: Progressing     Problem: Nutrition  Goal: Nutrition/Hydration status is improving  Description: Monitor and assess patient's nutrition/hydration status for malnutrition (ex- brittle hair, bruises, dry skin, pale skin and conjunctiva, muscle wasting, smooth red tongue, and disorientation). Collaborate with interdisciplinary team and initiate plan and interventions as ordered.  Monitor patient's weight and dietary intake as ordered or per policy. Utilize nutrition screening tool and intervene per policy. Determine patient's food preferences and provide high-protein, high-caloric foods as appropriate.     - Assist patient with eating.  - Allow adequate time for meals.  - Encourage patient to take dietary supplement as ordered.  - Collaborate with clinical nutritionist.  - Include patient/family/caregiver in decisions related to nutrition.  Outcome: Progressing

## 2023-12-21 NOTE — PLAN OF CARE
Problem: Potential for Falls  Goal: Patient will remain free of falls  Description: INTERVENTIONS:  - Educate patient/family on patient safety including physical limitations  - Instruct patient to call for assistance with activity   - Consult OT/PT to assist with strengthening/mobility   - Keep Call bell within reach  - Keep bed low and locked with side rails adjusted as appropriate  - Keep care items and personal belongings within reach  - Initiate and maintain comfort rounds  - Make Fall Risk Sign visible to staff  - Offer Toileting every 2 Hours, in advance of need  - Initiate/Maintain bed alarm  - Obtain necessary fall risk management equipment:   - Apply yellow socks and bracelet for high fall risk patients  - Consider moving patient to room near nurses station  Outcome: Progressing     Problem: PAIN - ADULT  Goal: Verbalizes/displays adequate comfort level or baseline comfort level  Description: Interventions:  - Encourage patient to monitor pain and request assistance  - Assess pain using appropriate pain scale  - Administer analgesics based on type and severity of pain and evaluate response  - Implement non-pharmacological measures as appropriate and evaluate response  - Consider cultural and social influences on pain and pain management  - Notify physician/advanced practitioner if interventions unsuccessful or patient reports new pain  Outcome: Progressing     Problem: INFECTION - ADULT  Goal: Absence or prevention of progression during hospitalization  Description: INTERVENTIONS:  - Assess and monitor for signs and symptoms of infection  - Monitor lab/diagnostic results  - Monitor all insertion sites, i.e. indwelling lines, tubes, and drains  - Monitor endotracheal if appropriate and nasal secretions for changes in amount and color  - [place] appropriate cooling/warming therapies per order  - Administer medications as ordered  - Instruct and encourage patient and family to use good hand hygiene  technique  - Identify and instruct in appropriate isolation precautions for identified infection/condition  Outcome: Progressing  Goal: Absence of fever/infection during neutropenic period  Description: INTERVENTIONS:  - Monitor WBC    Outcome: Progressing     Problem: SAFETY ADULT  Goal: Patient will remain free of falls  Description: INTERVENTIONS:  - Educate patient/family on patient safety including physical limitations  - Instruct patient to call for assistance with activity   - Consult OT/PT to assist with strengthening/mobility   - Keep Call bell within reach  - Keep bed low and locked with side rails adjusted as appropriate  - Keep care items and personal belongings within reach  - Initiate and maintain comfort rounds  - Make Fall Risk Sign visible to staff  - Offer Toileting every 2 Hours, in advance of need  - Initiate/Maintain bed alarm  - Obtain necessary fall risk management equipment:   - Apply yellow socks and bracelet for high fall risk patients  - Consider moving patient to room near nurses station  Outcome: Progressing  Goal: Maintain or return to baseline ADL function  Description: INTERVENTIONS:  -  Assess patient's ability to carry out ADLs; assess patient's baseline for ADL function and identify physical deficits which impact ability to perform ADLs (bathing, care of mouth/teeth, toileting, grooming, dressing, etc.)  - Assess/evaluate cause of self-care deficits   - Assess range of motion  - Assess patient's mobility; develop plan if impaired  - Assess patient's need for assistive devices and provide as appropriate  - Encourage maximum independence but intervene and supervise when necessary  - Involve family in performance of ADLs  - Assess for home care needs following discharge   - Consider OT consult to assist with ADL evaluation and planning for discharge  - Provide patient education as appropriate  Outcome: Progressing  Goal: Maintains/Returns to pre admission functional  level  Description: INTERVENTIONS:  - Perform AM-PAC 6 Click Basic Mobility/ Daily Activity assessment daily.  - Set and communicate daily mobility goal to care team and patient/family/caregiver.   - Collaborate with rehabilitation services on mobility goals if consulted  - Perform Range of Motion 3 times a day.  - Reposition patient every 3 hours.  - Dangle patient 3 times a day  - Stand patient 3 times a day  - Ambulate patient 3 times a day  - Out of bed to chair 3 times a day   - Out of bed for meals 3 times a day  - Out of bed for toileting  - Record patient progress and toleration of activity level   Outcome: Progressing     Problem: DISCHARGE PLANNING  Goal: Discharge to home or other facility with appropriate resources  Description: INTERVENTIONS:  - Identify barriers to discharge w/patient and caregiver  - Arrange for needed discharge resources and transportation as appropriate  - Identify discharge learning needs (meds, wound care, etc.)  - Arrange for interpretive services to assist at discharge as needed  - Refer to Case Management Department for coordinating discharge planning if the patient needs post-hospital services based on physician/advanced practitioner order or complex needs related to functional status, cognitive ability, or social support system  Outcome: Progressing     Problem: Knowledge Deficit  Goal: Patient/family/caregiver demonstrates understanding of disease process, treatment plan, medications, and discharge instructions  Description: Complete learning assessment and assess knowledge base.  Interventions:  - Provide teaching at level of understanding  - Provide teaching via preferred learning methods  Outcome: Progressing     Problem: Neurological Deficit  Goal: Neurological status is stable or improving  Description: Interventions:  - Monitor and assess patient's level of consciousness, motor function, sensory function, and level of assistance needed for ADLs.   - Monitor and report  changes from baseline. Collaborate with interdisciplinary team to initiate plan and implement interventions as ordered.   - Provide and maintain a safe environment.  - Consider seizure precautions.  - Consider fall precautions.  - Consider aspiration precautions.  - Consider bleeding precautions.  Outcome: Progressing     Problem: Activity Intolerance/Impaired Mobility  Goal: Mobility/activity is maintained at optimum level for patient  Description: Interventions:  - Assess and monitor patient  barriers to mobility and need for assistive/adaptive devices.  - Assess patient's emotional response to limitations.  - Collaborate with interdisciplinary team and initiate plans and interventions as ordered.  - Encourage independent activity per ability.  - Maintain proper body alignment.  - Perform active/passive rom as tolerated/ordered.  - Plan activities to conserve energy.  - Turn patient as appropriate  Outcome: Progressing     Problem: Communication Impairment  Goal: Ability to express needs and understand communication  Description: Assess patient's communication skills and ability to understand information.  Patient will demonstrate use of effective communication techniques, alternative methods of communication and understanding even if not able to speak.     - Encourage communication and provide alternate methods of communication as needed.  - Collaborate with case management/ for discharge needs.  - Include patient/family/caregiver in decisions related to communication.  Outcome: Progressing     Problem: Potential for Aspiration  Goal: Non-ventilated patient's risk of aspiration is minimized  Description: Assess and monitor vital signs, respiratory status, and labs (WBC).  Monitor for signs of aspiration (tachypnea, cough, rales, wheezing, cyanosis, fever).    - Assess and monitor patient's ability to swallow.  - Place patient up in chair to eat if possible.  - HOB up at 90 degrees to eat if unable  to get patient up into chair.  - Supervise patient during oral intake.   - Instruct patient/ family to take small bites.  - Instruct patient/ family to take small single sips when taking liquids.  - Follow patient-specific strategies generated by speech pathologist.  Outcome: Progressing  Goal: Ventilated patient's risk of aspiration is minimized  Description: Assess and monitor vital signs, respiratory status, airway cuff pressure, and labs (WBC).  Monitor for signs of aspiration (tachypnea, cough, rales, wheezing, cyanosis, fever).    - Elevate head of bed 30 degrees if patient has tube feeding.  - Monitor tube feeding.  Outcome: Progressing     Problem: Nutrition  Goal: Nutrition/Hydration status is improving  Description: Monitor and assess patient's nutrition/hydration status for malnutrition (ex- brittle hair, bruises, dry skin, pale skin and conjunctiva, muscle wasting, smooth red tongue, and disorientation). Collaborate with interdisciplinary team and initiate plan and interventions as ordered.  Monitor patient's weight and dietary intake as ordered or per policy. Utilize nutrition screening tool and intervene per policy. Determine patient's food preferences and provide high-protein, high-caloric foods as appropriate.     - Assist patient with eating.  - Allow adequate time for meals.  - Encourage patient to take dietary supplement as ordered.  - Collaborate with clinical nutritionist.  - Include patient/family/caregiver in decisions related to nutrition.  Outcome: Progressing

## 2023-12-21 NOTE — PLAN OF CARE
Problem: OCCUPATIONAL THERAPY ADULT  Goal: Performs self-care activities at highest level of function for planned discharge setting.  See evaluation for individualized goals.  Description: Treatment Interventions: ADL retraining, Functional transfer training, UE strengthening/ROM, Endurance training, Patient/family training, Equipment evaluation/education, Compensatory technique education, Continued evaluation, Activityengagement          See flowsheet documentation for full assessment, interventions and recommendations.   Note: Limitation: Decreased ADL status, Decreased UE strength, Decreased Safe judgement during ADL, Decreased cognition, Decreased endurance, Decreased self-care trans, Decreased high-level ADLs  Prognosis: Fair  Assessment: Pt is a 68 y.o. female seen for OT evaluation s/p adm to [facility] on 12/20/2023 w/ Stroke-like symptoms . CT head: Small left posterior temporoparietal cortical hypodensity with hyperdense peripheral MCA vessel coursing through the parenchyma may represent acute to subacute infarction in the appropriate clinical context. Comorbidities affecting pt’s functional performance include a significant PMH of schizophrenia, Short term memory loss, hypertension, history of drug overdose, history of brain tumor with surgical resection, history of parotid tumor. Pt with active OT orders and activity orders for Up and OOB as tolerated. Pt is poor historian, info on home setup and PLOF obtained via chart. Pt lives with sister in a one level house. At baseline, pt required assist w/ ADLs and IADLs. Per chart: Pt is typically able to complete her ADLs with minimal direction. Mod I for functional transfers/mobility w/ use of RW. (-) . Denies falls PTA. Upon evaluation, pt currently requires Supervision for UB ADLs, Min A for LB ADLs, Min A for toileting, Supervision for bed mobility, Supervision for transfers, and Min A for functional mobility 2* the following deficits  impacting occupational performance: decreased strength , decreased balance, decreased activity tolerance, limited functional reach, impaired memory, impaired problem solving, impulsivity, and decreased safety awareness. These impairments, as well at pt’s personal factors of: difficulty performing ADLs, difficulty performing transfers/mobility, limited insight into deficits, fall risk , and functional decline  limit pt’s ability to safely engage in all baseline areas of occupation. Pt to continue to benefit from continued acute OT services during hospital stay to address defined deficits and to maximize level of functional independence in the following Occupational Performance areas: grooming, bathing/shower, toilet hygiene, dressing, health maintenance, functional mobility, community mobility, clothing management, and social participation. The patient's raw score on the -PAC Daily Activity Inpatient Short Form is 19. A raw score of greater than or equal to 19 suggests the patient may benefit from discharge to home. Please refer to the recommendation of the Occupational Therapist for safe discharge planning. OT will continue to follow pt 2-3x/wk to address the following goals to  w/in 10-14 days:     Rehab Resource Intensity Level, OT: III (Minimum Resource Intensity) (pending level of assistance in home environment)

## 2023-12-21 NOTE — SPEECH THERAPY NOTE
Speech Language/Pathology  Speech/Language Pathology  Assessment    Patient Name: Elvi Castillo  Today's Date: 12/21/2023     Problem List  Principal Problem:    Stroke-like symptoms  Active Problems:    Hypertension    Schizoaffective disorder (HCC)    Seizure disorder (HCC)    Hypokalemia    Parotid tumor    COVID-19    History of brain tumor    Past Medical History  Past Medical History:   Diagnosis Date    History of drug overdose 2005    Hypertension     Memory loss     Schizophrenia (HCC)      Past Surgical History  Past Surgical History:   Procedure Laterality Date    BRAIN SURGERY      Brain Tumor surgery - age late 20's done @ [facility]-    CARDIAC CATHETERIZATION  2015    HIP ARTHROPLASTY Right 01/15/2021    Procedure: HemiARTHROPLASTY HIP bipolar;  Surgeon: Mahesh Finch MD;  Location: [facility];  Service: Orthopedics    DC HEMIARTHROPLASTY HIP PARTIAL Right 01/15/2021    Procedure: HEMIARTHROPLASTY HIP (BIPOLAR);  Surgeon: Mahesh Finch MD;  Location: [facility];  Service: Orthopedics    US GUIDED LYMPH NODE BIOPSY LEFT  09/18/2023      Bedside Swallow Evaluation:    Summary:  Pt presented w/ mild oral dysphagia and pharyngeal stage WNL. Pt positioned upright and alert. She fed herself trials of puree, soft solids, and thin liquids via straw with single/successive sips. Pt mildly impulsive rate of feed. Mastication was mildly prolonged suspect due to edentulous condition. Bolus control, formation, and transfer were WNL. Swallows appeared prompt. Laryngeal rise upon palpation appeared adequate. No overt s/s of aspiration. Speech intelligibility varied. Benefitted from frequent repetition and redirection in following commands.     Recommendations:  Diet: Dysphagia 3 dental   Liquid: Thin   Meds: As tolerated  Supervision: intermittent  Positioning:Upright  Strategies: SLOW rate, alternate liquids with solids, and swallow prior to additional po  Pt to take PO/Meds only when fully alert and upright.    Oral care  Aspiration precautions  Reflux precautions  Therapy Prognosis: fair   Prognosis considerations: age, medical status   Frequency:2-5 times weekly     Consider consult w/:  Rehab  Nutrition    Goal(s):  Dysphagia LTG  -Patient will demonstrate safe and effective oral intake (without overt s/s significant oral/pharyngeal dysphagia including s/s penetration or aspiration) for the highest appropriate diet level.     1.Pt will tolerate least restrictive diet w/out s/s aspiration or oral/pharyngeal difficulties.   2.Pt will will effectively manipulate/masticate and transfer purees/solids w/out s/s dysphagia/aspiration.   3.Pt will tolerate thin liquids w/out s/s aspiration.   -If indicated, patient will comply with a Video/Modified Barium Swallow study for more complete assessment of swallowing anatomy/physiology/aspiration risk and to assess efficacy of treatment techniques so as to best guide treatment plan     H&P/Admit info/ pertinent provider notes: (PMH noted above)  Elvi Castillo is a 68 y.o.  female with seizure disorder on carbamazepine, status post right temporal mass resection, schizoaffective disorder, hypertension, presents with altered mental status.     Per ED's discussion with patient's sister, patient has not been herself for the past 3 days.  She is typically able to complete her ADLs with minimal direction (for example, able to dress herself when told to), but over the past 3 days has required significant amount of help.  She is also being tangential and her speech has been slurred.  She also has required the use of a wheeled walker to ambulate and this is not normal for her.     She was found to be COVID-positive.  CT head demonstrated small left posterior temporoparietal cortical hypodensity with hyperdense peripheral MCA vessel coursing through the parenchyma may represent acute to subacute infarction in the appropriate clinical context.  There was also chronic right temporal  postsurgical encephalomalacia.  Blood pressure on presentation 164/83.  Patient AP/AC negative baseline.  Per record review, patient on carbamazepine 400 mg twice daily.     On exam, patient appears to have some degree of expressive and perhaps some receptive aphasia, with wrong word substitutions and inconsistent ability to follow commands.  Limited       Special Studies:  CTA head and neck with and without contrast (12/20/2023) Impression   1. Age indeterminant hypodensity in the left middle cerebral artery territory in the temporoparietal junction with a focal hyperdense vessel sign indicative of vascular occlusion. Given the vessel hyperdensity, recent infarction is not excluded.   Additionally there is mild local sulcal effacement favoring a more recent/acute to subacute event. Further clinical assessment and follow-up advised.  2. 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 Both benign and malignant etiologies not excluded. Follow-up ENT assessment recommended if not previously performed.  3. Right temporal lobe encephalomalacia subjacent to prior pterional craniotomy. Correlation with neurosurgical history advised.  4. Mild to moderate atherosclerotic narrowing of the left internal carotid artery origin less than 50% by NASCET criteria.  5. 4.1 cm aneurysm of the ascending aorta. Further clinical assessment and follow-up recommended. This is stable from August neck CT.  CT head without contrast (12/20/2023) Impression   Small left posterior temporoparietal cortical hypodensity with hyperdense peripheral MCA vessel coursing through the parenchyma may represent acute to subacute infarction in the appropriate clinical context.  No intraparenchymal hemorrhage.  Chronic microangiopathy.  Right temporal postsurgical encephalomalacia.  XR chest 1 view portable (12/20/2023) Impression   No acute consolidation or congestion    Procalcitonin: N/a    WBC: 5.43                              "12/20/2023     Code Status: Level 3 DN    Previous MBS: none     Patient's goal: \"soup\"    Did the pt report pain? no  If yes, was nursing notified/was it addressed? N/a    Reason for consult:  R/o aspiration  Determine safest and least restrictive diet  Failed nursing dysphagia assessment  Change in mental status  New neuro event  Stroke protocol    Precautions:  Contact  [place]  Fall    Food Allergies: No known    Current Diet: NPO    Premorbid diet: Regular and thin    O2 requirement: Room Air    Social/Prior living Lives with family members    Voice/Speech: Volume clear, no dysphonia present. Varied levels of intelligibility    Follows commands: Basic with frequent redirection and repetition.    Cognitive status: Hx of schizoaffective disorder     Oral [facility] exam:  Dentition:edentulous (does not own dentures)  Lips (VII): WNL  Tongue (XII): WNL  Mandible (V):adequate ROM   Face/oral sensation (V):adequate, large parotid tumor to left side of face   Velum (X):WNL  Secretion management: WNL    Items administered:  Puree, soft solids, and thin liquids via straw with single/successive sips    Oral stage:  Lip closure: WNL  Mastication: mildly prolonged   Bolus formation: WNL  Bolus control:WNL  Transfer:adequate       Pharyngeal stage:  Swallow promptness: appeared prompt  Laryngeal rise:adequate   No overt s/s aspiration    Esophageal stage:  No s/s reported    Aspiration precautions posted    Results d/w:  Pt, nursing,  physician,           "

## 2023-12-22 ENCOUNTER — APPOINTMENT (INPATIENT)
Dept: NON INVASIVE DIAGNOSTICS | Facility: HOSPITAL | Age: 68
DRG: 064 | End: 2023-12-22
Payer: MEDICARE

## 2023-12-22 ENCOUNTER — APPOINTMENT (INPATIENT)
Dept: MRI IMAGING | Facility: HOSPITAL | Age: 68
DRG: 064 | End: 2023-12-22
Payer: MEDICARE

## 2023-12-22 PROBLEM — E87.6 HYPOKALEMIA: Status: RESOLVED | Noted: 2021-01-15 | Resolved: 2023-12-22

## 2023-12-22 LAB
ANION GAP SERPL CALCULATED.3IONS-SCNC: 4 MMOL/L
AORTIC VALVE MEAN VELOCITY: 10.2 M/S
APICAL FOUR CHAMBER EJECTION FRACTION: 54 %
AV AREA BY CONTINUOUS VTI: 1.6 CM2
AV AREA PEAK VELOCITY: 1.5 CM2
AV LVOT MEAN GRADIENT: 2 MMHG
AV LVOT PEAK GRADIENT: 4 MMHG
AV MEAN GRADIENT: 4 MMHG
AV PEAK GRADIENT: 7 MMHG
AV VALVE AREA: 1.63 CM2
AV VELOCITY RATIO: 0.76
BUN SERPL-MCNC: 15 MG/DL (ref 5–25)
CALCIUM SERPL-MCNC: 8.4 MG/DL (ref 8.4–10.2)
CHLORIDE SERPL-SCNC: 105 MMOL/L (ref 96–108)
CO2 SERPL-SCNC: 28 MMOL/L (ref 21–32)
CREAT SERPL-MCNC: 0.62 MG/DL (ref 0.6–1.3)
DOP CALC AO PEAK VEL: 1.31 M/S
DOP CALC AO VTI: 22.58 CM
DOP CALC LVOT AREA: 2.01 CM2
DOP CALC LVOT CARDIAC INDEX: 1.79 L/MIN/M2
DOP CALC LVOT CARDIAC OUTPUT: 2.78 L/MIN
DOP CALC LVOT DIAMETER: 1.6 CM
DOP CALC LVOT PEAK VEL VTI: 18.29 CM
DOP CALC LVOT PEAK VEL: 1 M/S
DOP CALC LVOT STROKE INDEX: 24.5 ML/M2
DOP CALC LVOT STROKE VOLUME: 36.76
E WAVE DECELERATION TIME: 150 MS
E/A RATIO: 0.7
ERYTHROCYTE [DISTWIDTH] IN BLOOD BY AUTOMATED COUNT: 13.4 % (ref 11.6–15.1)
FRACTIONAL SHORTENING: 30 (ref 28–44)
GFR SERPL CREATININE-BSD FRML MDRD: 94 ML/MIN/1.73SQ M
GLUCOSE SERPL-MCNC: 89 MG/DL (ref 65–140)
HCT VFR BLD AUTO: 36.1 % (ref 34.8–46.1)
HGB BLD-MCNC: 11.8 G/DL (ref 11.5–15.4)
INTERVENTRICULAR SEPTUM IN DIASTOLE (PARASTERNAL SHORT AXIS VIEW): 1.1 CM
INTERVENTRICULAR SEPTUM: 1.1 CM (ref 0.6–1.1)
LAAS-AP4: 15.8 CM2
LEFT ATRIUM AREA SYSTOLE SINGLE PLANE A4C: 16.1 CM2
LEFT INTERNAL DIMENSION IN SYSTOLE: 2.3 CM (ref 2.1–4)
LEFT VENTRICULAR INTERNAL DIMENSION IN DIASTOLE: 3.3 CM (ref 3.5–6)
LEFT VENTRICULAR POSTERIOR WALL IN END DIASTOLE: 1.2 CM
LEFT VENTRICULAR STROKE VOLUME: 27 ML
LVSV (TEICH): 27 ML
MCH RBC QN AUTO: 33 PG (ref 26.8–34.3)
MCHC RBC AUTO-ENTMCNC: 32.7 G/DL (ref 31.4–37.4)
MCV RBC AUTO: 101 FL (ref 82–98)
MV E'TISSUE VEL-LAT: 9 CM/S
MV E'TISSUE VEL-SEP: 6 CM/S
MV PEAK A VEL: 0.8 M/S
MV PEAK E VEL: 56 CM/S
MV STENOSIS PRESSURE HALF TIME: 44 MS
MV VALVE AREA P 1/2 METHOD: 5
PLATELET # BLD AUTO: 196 THOUSANDS/UL (ref 149–390)
PMV BLD AUTO: 8.3 FL (ref 8.9–12.7)
POTASSIUM SERPL-SCNC: 3.8 MMOL/L (ref 3.5–5.3)
RBC # BLD AUTO: 3.58 MILLION/UL (ref 3.81–5.12)
RIGHT ATRIUM AREA SYSTOLE A4C: 15.6 CM2
RIGHT VENTRICLE ID DIMENSION: 4.7 CM
SL CV LV EF: 65
SL CV PED ECHO LEFT VENTRICLE DIASTOLIC VOLUME (MOD BIPLANE) 2D: 46 ML
SL CV PED ECHO LEFT VENTRICLE SYSTOLIC VOLUME (MOD BIPLANE) 2D: 19 ML
SODIUM SERPL-SCNC: 137 MMOL/L (ref 135–147)
TR MAX PG: 13 MMHG
TR PEAK VELOCITY: 1.8 M/S
TRICUSPID ANNULAR PLANE SYSTOLIC EXCURSION: 1.9 CM
TRICUSPID VALVE PEAK REGURGITATION VELOCITY: 1.79 M/S
WBC # BLD AUTO: 4.8 THOUSAND/UL (ref 4.31–10.16)

## 2023-12-22 PROCEDURE — 85027 COMPLETE CBC AUTOMATED: CPT | Performed by: INTERNAL MEDICINE

## 2023-12-22 PROCEDURE — 80048 BASIC METABOLIC PNL TOTAL CA: CPT | Performed by: INTERNAL MEDICINE

## 2023-12-22 PROCEDURE — 93306 TTE W/DOPPLER COMPLETE: CPT

## 2023-12-22 PROCEDURE — 99232 SBSQ HOSP IP/OBS MODERATE 35: CPT | Performed by: PHYSICIAN ASSISTANT

## 2023-12-22 PROCEDURE — 70551 MRI BRAIN STEM W/O DYE: CPT

## 2023-12-22 RX ORDER — CLOPIDOGREL BISULFATE 75 MG/1
75 TABLET ORAL DAILY
Status: DISCONTINUED | OUTPATIENT
Start: 2023-12-22 | End: 2023-12-31 | Stop reason: HOSPADM

## 2023-12-22 RX ADMIN — CLOPIDOGREL BISULFATE 75 MG: 75 TABLET ORAL at 12:43

## 2023-12-22 RX ADMIN — CARBAMAZEPINE 400 MG: 200 TABLET ORAL at 23:55

## 2023-12-22 RX ADMIN — ASPIRIN 81 MG CHEWABLE TABLET 81 MG: 81 TABLET CHEWABLE at 09:21

## 2023-12-22 RX ADMIN — ENOXAPARIN SODIUM 40 MG: 40 INJECTION SUBCUTANEOUS at 09:21

## 2023-12-22 RX ADMIN — OLANZAPINE 40 MG: 10 TABLET, FILM COATED ORAL at 23:55

## 2023-12-22 RX ADMIN — ATORVASTATIN CALCIUM 40 MG: 40 TABLET, FILM COATED ORAL at 17:27

## 2023-12-22 NOTE — ASSESSMENT & PLAN NOTE
Per sister, pt has a history of a brain tumor that was resected in the patient's 20s  Currently patient lives with her sister who provides most care and assistance  At baseline patient is able to perform simple tasks like dress herself but does have memory loss  Worsened confusion and speech difficulties in the setting of acute CVA  Currently on CVA pathway

## 2023-12-22 NOTE — PROGRESS NOTES
[facility]  Progress Note  Name: Elvi Castillo I  MRN: [id number]  Unit/Bed#: E4 -01 I Date of Admission: 12/20/2023   Date of Service: 12/22/2023 I Hospital Day: 2    Assessment/Plan   * Stroke-like symptoms  Assessment & Plan  Presented to the ED with 3 days of confusion and slurred speech  CTA head with age-indeterminate hypodensity in left middle cerebral artery territory in temporoparietal junction with focal hyperdense vessel sign indicative of vascular occlusion.  Additionally mild local sulcal effacement favoring a more recent/acute/subacute event  Admitted on stroke pathway  Received aspirin and Plavix bolus  Started on aspirin 81 mg daily, plavix 75 mg daily and atorvastatin 40 mg  Allow for permissive hypertension-hold home amlodipine  Monitor on telemetry   Continue neuro checks  Stat CT head for changes in neuro exam  MRI brain pending  Echo obtained, pending read  Will likely need HILLARY   Neurology consulted  PT/OT  Speech evaluated- level 3 dysphagia    History of brain tumor  Assessment & Plan  Per sister, pt has a history of a brain tumor that was resected in the patient's 20s  Currently patient lives with her sister who provides most care and assistance  At baseline patient is able to perform simple tasks like dress herself but does have memory loss  Worsened confusion and speech difficulties in the setting of acute CVA  Currently on CVA pathway    COVID-19  Assessment & Plan  Presented to the hospital with slurred speech and confusion x 3 days  Found to be COVID-positive  Asymptomatic and not hypoxic  CXR without infiltrate or consolidation  Therefore hold off on remdesivir      Parotid tumor  Assessment & Plan  CT head performed revealing 4.6 cm left parotid mass which was previously imaged and is stable from August  Has seen ENT as an outpatient had biopsy performed 9/18/2023-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  For removal on 11/8/2023 however there was an issue  with her preoperative EGD and surgery was not performed  Was scheduled to undergo additional cardiac workup prior to rescheduling removal of tumor  Ongoing outpt followup with ENT    Seizure disorder (HCC)  Assessment & Plan  Maintained on Tegretol for 100 mg twice daily    Schizoaffective disorder (HCC)  Assessment & Plan  Continue home regimen of Zyprexa 40 mg at bed    Hypertension  Assessment & Plan  Presenting with hypertension urgency in the setting of acute stroke  Home regimen amlodipine 5 mg daily  Hold amlodipine in the setting of CVA pathway to allow for permissive hypertension    Hypokalemia-resolved as of 12/22/2023  Assessment & Plan  Replaced. Check labs in am.   resolved           VTE Pharmacologic Prophylaxis:   Moderate Risk (Score 3-4) - Pharmacological DVT Prophylaxis Ordered: enoxaparin (Lovenox).    Mobility:   Basic Mobility Inpatient Raw Score: 21  JH-HLM Goal: 6: Walk 10 steps or more  JH-HLM Achieved: 7: Walk 25 feet or more  HLM Goal achieved. Continue to encourage appropriate mobility.    Patient Centered Rounds: I performed bedside rounds with nursing staff today.   Discussions with Specialists or Other Care Team Provider: None    Education and Discussions with Family / Patient: Updated  (sister) at bedside.    Total Time Spent on Date of Encounter in care of patient: 35 mins. This time was spent on one or more of the following: performing physical exam; counseling and coordination of care; obtaining or reviewing history; documenting in the medical record; reviewing/ordering tests, medications or procedures; communicating with other healthcare professionals and discussing with patient's family/caregivers.    Current Length of Stay: 2 day(s)  Current Patient Status: Inpatient   Certification Statement: The patient will continue to require additional inpatient hospital stay due to MRI, Echo  Discharge Plan: Anticipate discharge in 48 hrs to home with home services.    Code  Status: Level 3 - DNAR and DNI    Subjective:   The patient is seen resting comfortably in bed, her speech is slurred and slightly garbled.  She denies any complaints at this time.  She appears comfortable.    Objective:     Vitals:   Temp (24hrs), Av.5 °F (36.9 °C), Min:97.6 °F (36.4 °C), Max:100 °F (37.8 °C)    Temp:  [97.6 °F (36.4 °C)-100 °F (37.8 °C)] 98.4 °F (36.9 °C)  HR:  [75-87] 80  Resp:  [16-18] 16  BP: (122-142)/(63-78) 122/78  SpO2:  [96 %-98 %] 96 %  Body mass index is 20.9 kg/m².     Input and Output Summary (last 24 hours):   No intake or output data in the 24 hours ending 23 1226    Physical Exam:   Physical Exam  Vitals and nursing note reviewed.   Constitutional:       General: She is not in acute distress.     Appearance: Normal appearance. She is not ill-appearing, toxic-appearing or diaphoretic.   HENT:      Head: Normocephalic and atraumatic.   Cardiovascular:      Rate and Rhythm: Normal rate and regular rhythm.      Heart sounds: No murmur heard.     No friction rub. No gallop.   Pulmonary:      Effort: Pulmonary effort is normal. No respiratory distress.      Breath sounds: Normal breath sounds. No wheezing, rhonchi or rales.   Abdominal:      General: Abdomen is flat. Bowel sounds are normal. There is no distension.      Palpations: Abdomen is soft.      Tenderness: There is no abdominal tenderness.   Musculoskeletal:      Right lower leg: No edema.      Left lower leg: No edema.   Skin:     General: Skin is warm and dry.      Coloration: Skin is not jaundiced or pale.   Neurological:      General: No focal deficit present.      Mental Status: She is alert and oriented to person, place, and time. Mental status is at baseline.      Cranial Nerves: Dysarthria present.      Motor: No weakness.      Comments: Slurred speech          Additional Data:     Labs:  Results from last 7 days   Lab Units 23  0726 23  0949   WBC Thousand/uL 4.80 5.43   HEMOGLOBIN g/dL 11.8 11.9    HEMATOCRIT % 36.1 36.2   PLATELETS Thousands/uL 196 199   NEUTROS PCT %  --  80*   LYMPHS PCT %  --  7*   MONOS PCT %  --  12   EOS PCT %  --  0     Results from last 7 days   Lab Units 12/22/23  0726 12/20/23  0949   SODIUM mmol/L 137 137   POTASSIUM mmol/L 3.8 3.3*   CHLORIDE mmol/L 105 103   CO2 mmol/L 28 31   BUN mg/dL 15 19   CREATININE mg/dL 0.62 0.73   ANION GAP mmol/L 4 3   CALCIUM mg/dL 8.4 8.7   ALBUMIN g/dL  --  3.6   TOTAL BILIRUBIN mg/dL  --  0.33   ALK PHOS U/L  --  85   ALT U/L  --  19   AST U/L  --  20   GLUCOSE RANDOM mg/dL 89 71             Results from last 7 days   Lab Units 12/21/23  0542   HEMOGLOBIN A1C % 5.3           Lines/Drains:  Invasive Devices       Peripheral Intravenous Line  Duration             Peripheral IV 12/20/23 Left Antecubital 2 days                      Telemetry:  Telemetry Orders (From admission, onward)               24 Hour Telemetry Monitoring  Continuous x 24 Hours (Telem)        Expiring   Question:  Reason for 24 Hour Telemetry  Answer:  TIA/Suspected CVA/ Confirmed CVA                     Telemetry Reviewed: Normal Sinus Rhythm  Indication for Continued Telemetry Use: Acute CVA             Imaging: Reviewed radiology reports from this admission including: CT head    Recent Cultures (last 7 days):         Last 24 Hours Medication List:   Current Facility-Administered Medications   Medication Dose Route Frequency Provider Last Rate    aspirin  81 mg Oral Daily Jigna Salmon PA-C      atorvastatin  40 mg Oral QPM Jigna Salmon PA-C      carBAMazepine  400 mg Oral HS Jigna Salmon PA-C      clopidogrel  75 mg Oral Daily Truong Lombardo PA-C      enoxaparin  40 mg Subcutaneous Daily Jigna Salmon PA-C      OLANZapine  40 mg Oral HS Jigna Salmon PA-C      ondansetron  4 mg Intravenous Q6H PRN Jigna Salmon PA-C          Today, Patient Was Seen By: Truong Lombardo PA-C    **Please Note: This note may have been constructed using a voice recognition system.**

## 2023-12-22 NOTE — ASSESSMENT & PLAN NOTE
Presented to the ED with 3 days of confusion and slurred speech  CTA head with age-indeterminate hypodensity in left middle cerebral artery territory in temporoparietal junction with focal hyperdense vessel sign indicative of vascular occlusion.  Additionally mild local sulcal effacement favoring a more recent/acute/subacute event  Admitted on stroke pathway  Received aspirin and Plavix bolus  Started on aspirin 81 mg daily, plavix 75 mg daily and atorvastatin 40 mg  Allow for permissive hypertension-hold home amlodipine  Monitor on telemetry   Continue neuro checks  Stat CT head for changes in neuro exam  MRI brain pending  Echo obtained, pending read  Will likely need HILLARY   Neurology consulted  PT/OT  Speech evaluated- level 3 dysphagia

## 2023-12-23 PROBLEM — I63.9 STROKE (CEREBRUM) (HCC): Status: ACTIVE | Noted: 2023-12-20

## 2023-12-23 PROBLEM — I63.512 ACUTE ISCHEMIC LEFT MCA STROKE (HCC): Status: ACTIVE | Noted: 2023-12-20

## 2023-12-23 LAB
ANION GAP SERPL CALCULATED.3IONS-SCNC: 3 MMOL/L
BUN SERPL-MCNC: 16 MG/DL (ref 5–25)
CALCIUM SERPL-MCNC: 8.7 MG/DL (ref 8.4–10.2)
CHLORIDE SERPL-SCNC: 104 MMOL/L (ref 96–108)
CO2 SERPL-SCNC: 31 MMOL/L (ref 21–32)
CREAT SERPL-MCNC: 0.76 MG/DL (ref 0.6–1.3)
ERYTHROCYTE [DISTWIDTH] IN BLOOD BY AUTOMATED COUNT: 13.3 % (ref 11.6–15.1)
GFR SERPL CREATININE-BSD FRML MDRD: 82 ML/MIN/1.73SQ M
GLUCOSE SERPL-MCNC: 92 MG/DL (ref 65–140)
HCT VFR BLD AUTO: 34.8 % (ref 34.8–46.1)
HGB BLD-MCNC: 11.9 G/DL (ref 11.5–15.4)
MCH RBC QN AUTO: 32.7 PG (ref 26.8–34.3)
MCHC RBC AUTO-ENTMCNC: 34.2 G/DL (ref 31.4–37.4)
MCV RBC AUTO: 96 FL (ref 82–98)
PLATELET # BLD AUTO: 200 THOUSANDS/UL (ref 149–390)
PMV BLD AUTO: 8.1 FL (ref 8.9–12.7)
POTASSIUM SERPL-SCNC: 4.8 MMOL/L (ref 3.5–5.3)
RBC # BLD AUTO: 3.64 MILLION/UL (ref 3.81–5.12)
SODIUM SERPL-SCNC: 138 MMOL/L (ref 135–147)
TSH SERPL DL<=0.05 MIU/L-ACNC: 2.06 UIU/ML (ref 0.45–4.5)
WBC # BLD AUTO: 5.49 THOUSAND/UL (ref 4.31–10.16)

## 2023-12-23 PROCEDURE — 99232 SBSQ HOSP IP/OBS MODERATE 35: CPT | Performed by: PHYSICIAN ASSISTANT

## 2023-12-23 PROCEDURE — 99215 OFFICE O/P EST HI 40 MIN: CPT | Performed by: PSYCHIATRY & NEUROLOGY

## 2023-12-23 PROCEDURE — 84443 ASSAY THYROID STIM HORMONE: CPT | Performed by: PHYSICIAN ASSISTANT

## 2023-12-23 PROCEDURE — 85027 COMPLETE CBC AUTOMATED: CPT | Performed by: PHYSICIAN ASSISTANT

## 2023-12-23 PROCEDURE — 80048 BASIC METABOLIC PNL TOTAL CA: CPT | Performed by: PHYSICIAN ASSISTANT

## 2023-12-23 RX ADMIN — ENOXAPARIN SODIUM 40 MG: 40 INJECTION SUBCUTANEOUS at 08:06

## 2023-12-23 RX ADMIN — ASPIRIN 81 MG CHEWABLE TABLET 81 MG: 81 TABLET CHEWABLE at 08:06

## 2023-12-23 RX ADMIN — CARBAMAZEPINE 400 MG: 200 TABLET ORAL at 23:03

## 2023-12-23 RX ADMIN — CLOPIDOGREL BISULFATE 75 MG: 75 TABLET ORAL at 08:06

## 2023-12-23 RX ADMIN — OLANZAPINE 40 MG: 10 TABLET, FILM COATED ORAL at 23:03

## 2023-12-23 RX ADMIN — ATORVASTATIN CALCIUM 40 MG: 40 TABLET, FILM COATED ORAL at 18:22

## 2023-12-23 NOTE — ASSESSMENT & PLAN NOTE
CT head performed revealing 4.6 cm left parotid mass which was previously imaged and is stable from August  Has seen ENT as an outpatient had biopsy performed 9/18/2023-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  For removal on 11/8/2023 however there was an issue with her preoperative EGD and surgery was not performed  Was scheduled to undergo additional cardiac workup prior to rescheduling removal of tumor   Echo with EF 65%  Ongoing outpt followup with ENT

## 2023-12-23 NOTE — PROGRESS NOTES
[facility]  Progress Note  Name: Elvi Castillo I  MRN: [id number]  Unit/Bed#: E4 -01 I Date of Admission: 12/20/2023   Date of Service: 12/23/2023 I Hospital Day: 3    Assessment/Plan   * Acute ischemic left MCA stroke (HCC)  Assessment & Plan  Presented to the ED with 3 days of confusion and slurred speech  CTA head with age-indeterminate hypodensity in left middle cerebral artery territory in temporoparietal junction with focal hyperdense vessel sign indicative of vascular occlusion.  Additionally mild local sulcal effacement favoring a more recent/acute/subacute event  Admitted on stroke pathway  Received aspirin and Plavix bolus  Started on aspirin 81 mg daily, plavix 75 mg daily and atorvastatin 40 mg  Allow for permissive hypertension-hold home amlodipine  Monitor on telemetry   Continue neuro checks  Stat CT head for changes in neuro exam  MRI brain with moderate-sized acute left MCA infarct  Echo obtained, EF 65%, no PFO noted, valves not well visualized  Cardiology consulted for possible HILLARY however unsure if this will be able to be performed given patient's COVID-positive status  Neurology consulted  PT/OT  Speech evaluated- level 3 dysphagia    History of brain tumor  Assessment & Plan  Per sister, pt has a history of a brain tumor that was resected in the patient's 20s  Currently patient lives with her sister who provides most care and assistance  At baseline patient is able to perform simple tasks like dress herself but does have memory loss  Worsened confusion and speech difficulties in the setting of acute CVA  Currently on CVA pathway    COVID-19  Assessment & Plan  Presented to the hospital with slurred speech and confusion x 3 days  Found to be COVID-positive  Asymptomatic and not hypoxic  CXR without infiltrate or consolidation  Therefore hold off on remdesivir      Parotid tumor  Assessment & Plan  CT head performed revealing 4.6 cm left parotid mass which was  previously imaged and is stable from August  Has seen ENT as an outpatient had biopsy performed 9/18/2023-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  For removal on 11/8/2023 however there was an issue with her preoperative EGD and surgery was not performed  Was scheduled to undergo additional cardiac workup prior to rescheduling removal of tumor   Echo with EF 65%  Ongoing outpt followup with ENT    Seizure disorder (HCC)  Assessment & Plan  Maintained on Tegretol for 100 mg twice daily    Schizoaffective disorder (HCC)  Assessment & Plan  Continue home regimen of Zyprexa 40 mg at bed    Hypertension  Assessment & Plan  Presenting with hypertension urgency in the setting of acute stroke  Home regimen amlodipine 5 mg daily  Hold amlodipine in the setting of CVA pathway to allow for permissive hypertension             VTE Pharmacologic Prophylaxis:   Moderate Risk (Score 3-4) - Pharmacological DVT Prophylaxis Ordered: enoxaparin (Lovenox).    Mobility:   Basic Mobility Inpatient Raw Score: 21  JH-HLM Goal: 6: Walk 10 steps or more  JH-HLM Achieved: 6: Walk 10 steps or more  HLM Goal achieved. Continue to encourage appropriate mobility.    Patient Centered Rounds: I performed bedside rounds with nursing staff today.   Discussions with Specialists or Other Care Team Provider: Neurology    Education and Discussions with Family / Patient: Updated  (sister) via phone.    Total Time Spent on Date of Encounter in care of patient: 35 mins. This time was spent on one or more of the following: performing physical exam; counseling and coordination of care; obtaining or reviewing history; documenting in the medical record; reviewing/ordering tests, medications or procedures; communicating with other healthcare professionals and discussing with patient's family/caregivers.    Current Length of Stay: 3 day(s)  Current Patient Status: Inpatient   Certification Statement: The patient will continue to require additional inpatient  hospital stay due to HILLARY  Discharge Plan: Anticipate discharge in 48 hrs to discharge location to be determined pending rehab evaluations.    Code Status: Level 3 - DNAR and DNI    Subjective:   The patient is seen resting comfortably in bed.  She offers no complaints, appears comfortable.    Objective:     Vitals:   Temp (24hrs), Av.7 °F (37.1 °C), Min:97.6 °F (36.4 °C), Max:99.4 °F (37.4 °C)    Temp:  [97.6 °F (36.4 °C)-99.4 °F (37.4 °C)] 98.3 °F (36.8 °C)  HR:  [72-83] 83  Resp:  [16-20] 18  BP: (140-156)/(68-83) 143/83  SpO2:  [95 %-98 %] 98 %  Body mass index is 20.9 kg/m².     Input and Output Summary (last 24 hours):   No intake or output data in the 24 hours ending 23 1409    Physical Exam:   Physical Exam  Vitals and nursing note reviewed.   Constitutional:       General: She is not in acute distress.     Appearance: Normal appearance. She is not ill-appearing, toxic-appearing or diaphoretic.   HENT:      Head: Normocephalic and atraumatic.   Cardiovascular:      Rate and Rhythm: Normal rate and regular rhythm.      Heart sounds: No murmur heard.     No friction rub. No gallop.   Pulmonary:      Effort: Pulmonary effort is normal. No respiratory distress.      Breath sounds: Normal breath sounds. No wheezing, rhonchi or rales.   Abdominal:      General: Abdomen is flat. Bowel sounds are normal. There is no distension.      Palpations: Abdomen is soft.      Tenderness: There is no abdominal tenderness.   Musculoskeletal:      Right lower leg: No edema.      Left lower leg: No edema.   Skin:     General: Skin is warm and dry.      Coloration: Skin is not jaundiced or pale.   Neurological:      Mental Status: She is alert.      Cranial Nerves: Dysarthria present. No cranial nerve deficit.      Motor: No weakness.      Comments: Occasional nonsensical speech, some dysarthria, slurred          Additional Data:     Labs:  Results from last 7 days   Lab Units 23  0752 23  0726 23  0949    WBC Thousand/uL 5.49   < > 5.43   HEMOGLOBIN g/dL 11.9   < > 11.9   HEMATOCRIT % 34.8   < > 36.2   PLATELETS Thousands/uL 200   < > 199   NEUTROS PCT %  --   --  80*   LYMPHS PCT %  --   --  7*   MONOS PCT %  --   --  12   EOS PCT %  --   --  0    < > = values in this interval not displayed.     Results from last 7 days   Lab Units 23  0752 23  0726 23  0949   SODIUM mmol/L 138   < > 137   POTASSIUM mmol/L 4.8   < > 3.3*   CHLORIDE mmol/L 104   < > 103   CO2 mmol/L 31   < > 31   BUN mg/dL 16   < > 19   CREATININE mg/dL 0.76   < > 0.73   ANION GAP mmol/L 3   < > 3   CALCIUM mg/dL 8.7   < > 8.7   ALBUMIN g/dL  --   --  3.6   TOTAL BILIRUBIN mg/dL  --   --  0.33   ALK PHOS U/L  --   --  85   ALT U/L  --   --  19   AST U/L  --   --  20   GLUCOSE RANDOM mg/dL 92   < > 71    < > = values in this interval not displayed.             Results from last 7 days   Lab Units 23  0542   HEMOGLOBIN A1C % 5.3           Lines/Drains:  Invasive Devices       Peripheral Intravenous Line  Duration             Peripheral IV 23 Left Antecubital 3 days                      Telemetry:  Telemetry Orders (From admission, onward)               24 Hour Telemetry Monitoring  Continuous x 24 Hours (Telem)           Question:  Reason for 24 Hour Telemetry  Answer:  TIA/Suspected CVA/ Confirmed CVA                     Telemetry Reviewed: Normal Sinus Rhythm  Indication for Continued Telemetry Use: Acute CVA             Imaging: Reviewed radiology reports from this admission including: MRI brain    Recent Cultures (last 7 days):         Last 24 Hours Medication List:   Current Facility-Administered Medications   Medication Dose Route Frequency Provider Last Rate    aspirin  81 mg Oral Daily Jigna Salmon PA-C      atorvastatin  40 mg Oral QPM Jigna Salmon PA-C      carBAMazepine  400 mg Oral HS Jigna Salmon PA-C      clopidogrel  75 mg Oral Daily Truong Lombardo PA-C      enoxaparin  40 mg Subcutaneous Daily  Jigna Salmon PA-C      OLANZapine  40 mg Oral HS Jigna Salmon PA-C      ondansetron  4 mg Intravenous Q6H PRN Jigna Salmon PA-C          Today, Patient Was Seen By: Truong Lombardo PA-C    **Please Note: This note may have been constructed using a voice recognition system.**

## 2023-12-23 NOTE — PROGRESS NOTES
Progress Note - Neurology   Elvi Castillo 68 y.o. female MRN: [id number]  Unit/Bed#: E4 -01 Encounter: [id number]      Assessment/Plan     * Acute ischemic left MCA stroke (HCC)  Assessment & Plan  68-year-old female with seizure disorder on carbamazepine, status post right temporal mass resection, schizoaffective disorder, hypertension, who presented with altered mental status. Patient presented with 3-day history of increased difficulty performing ADLs and walking independently with apparent confusion, reported slurred and tangential speech. Exam limited but significant for mixed aphasia.    - CT head demonstrated small left posterior temporoparietal cortical hypodensity with hyperdense peripheral MCA vessel coursing through the parenchyma may represent acute to subacute infarction in the appropriate clinical context.  There was also chronic right temporal postsurgical encephalomalacia.  - CTA head/neck with no LVO.  - Blood pressure on presentation 164/83.  - Patient AP/AC negative baseline.  - Patient on carbamazepine 400 mg twice daily at baseline.    MRI brain without contrast was completed and demonstrates moderate-sized acute left middle cerebral artery infarction. Etiology of infarct is unclear at this time, concern for embolic source of infarct.    Plan:  -Continue on acute ischemic stroke pathway.  - Echocardiogram completed, EF 65%, no diagnostic regional wall motion abnormality identified. Diastolic function mildly abnormal. Right ventricular cavity size is mildly dilated. No PFO detected at rest, with cough, or with Valsalva using agitated saline contrast. Mild mitral regurgitation. Mild tricuspid regurgitation.   - Recommend HILLARY for further evaluation due to concern for embolic etiology of stroke. Patient's sister at bedside and reviewed this recommendation. She notes that patient was scheduled to have parotid surgery in November 2023 but was cancelled the day of surgery due to anesthesiology  "concerns for cardiac clearance. Discussed with internal medicine team and will consult cardiology team for further evaluation and consideration for HILLARY.  - Hemoglobin A1c reviewed, 5.3.   - Lipid panel reviewed, total cholesterol 131, triglycerides 57, HDL 61, LDL 59.   - Recommend check TSH.   - Continue to monitor on telemetry.  - s/p loading  mg x 1.   - Continue on DAPT with ASA 81 mg QD and Plavix 75 mg QD.  - Continue atorvastatin 40 mg.  - Patient's sister reports patient complaining of LE pain and swelling at times L>R. Will check venous duplex B/L LE.  - PT/OT/speech therapy.  - Can normalize BP with goal of normotension.   - Goal normothermia and euglycemia.   - Stroke education.   - Monitor exam and notify with changes.        Seizure disorder (HCC)  Assessment & Plan  - Maintained on Carbamazepine 400 mg QHS.   - Carbamazepine level 7.   - Maintain seizure precautions.       Hypertension  Assessment & Plan  - Can begin to normalize BP.   - Recommend avoid hypotension.   - Management as per primary team.    Parotid tumor  Assessment & Plan  - Has seen ENT and was scheduled for removal in 11/2023 but procedure was cancelled by anesthesiology due to concern for cardiac status. Patient was seen by cardiology and was awaiting echocardiogram for clearance.     COVID-19  Assessment & Plan  - Found to be Covid positive on admission.   - Management as per primary team.    Schizoaffective disorder (HCC)  Assessment & Plan  - Continue home regimen Zyprexa 40 mg QHS as per primary team.           Recommendations for outpatient neurological follow up have yet to be determined.    Subjective:   Patient reports she feels \"good.\" Additional subjective information unable to reliably be assessed in setting of aphasia.    ROS:  Unable to reliably assess secondary to aphasia.    Medications  Scheduled Meds:  Current Facility-Administered Medications   Medication Dose Route Frequency Provider Last Rate    aspirin  81 " "mg Oral Daily Jigna Piger, PA-C      atorvastatin  40 mg Oral QPM Jigna Piger, PA-C      carBAMazepine  400 mg Oral HS Jigna Piger, PA-C      clopidogrel  75 mg Oral Daily Truong Slate, PA-C      enoxaparin  40 mg Subcutaneous Daily Jigna Piger, PA-C      OLANZapine  40 mg Oral HS Jigna Piger, PA-C      ondansetron  4 mg Intravenous Q6H PRN Jigna Piger, PA-C       Continuous Infusions:   PRN Meds:.  ondansetron    Vitals: Blood pressure 143/83, pulse 83, temperature 98.3 °F (36.8 °C), temperature source Oral, resp. rate 18, height 5' 3\" (1.6 m), weight 53.5 kg (118 lb), SpO2 98%.,Body mass index is 20.9 kg/m².    Physical Exam: /83 (BP Location: Right arm)   Pulse 83   Temp 98.3 °F (36.8 °C) (Oral)   Resp 18   Ht 5' 3\" (1.6 m)   Wt 53.5 kg (118 lb)   SpO2 98%   BMI 20.90 kg/m²   General appearance:  Awake and alert, lying in bed and appears comfortable and in no acute distress.  Head: Normocephalic, without obvious abnormality, atraumatic, L parotid mass noted  Eyes: negative findings: lids and lashes normal, conjunctivae and sclerae normal, pupils equal, round, reactive to light and accomodation, and blink to threat intact B/L  Extremities:  no significant edema noted, areas of ecchymosis noted B/L LE.  Skin: Skin color, texture, turgor normal. No rashes or lesions  Neurologic: Mental status: Patient is awake and alert, speech is nonsensical at times and noted to have moderate dysarthria. Able to state her name and that she is in the hospital but unable to answer additional orientation questions, name objects or repeat phrases.   Cranial nerves: II: pupils equal, round, reactive to light and accommodation, III,VII: ptosis no ptosis noted, III,IV,VI: extraocular muscles extra-ocular motions intact, VII: upper facial muscle function normal bilaterally, VII: lower facial muscle function normal bilaterally  Sensory: Unable to reliably assess secondary to aphasia and difficulty following " commands.   Motor: Moving all extremities well and does not appear to have obvious lateralizing weakness, but unable to formally assess motor strength in setting of aphasia and difficulty following commands.    Labs  Recent Results (from the past 24 hour(s))   Echo complete w/ contrast if indicated    Collection Time: 12/22/23 11:47 AM   Result Value Ref Range    A4C EF 54 %    LVOT stroke volume 36.76     LVOT stroke volume index 24.50 ml/m2    LVOT Cardiac Index 1.79 l/min/m2    LVOT Cardiac Output 2.78 l/min    LVIDd 3.30 cm    LVIDS 2.30 cm    IVSd 1.10 cm    LVPWd 1.20 cm    FS 30 28 - 44    MV E' Tissue Velocity Septal 6 cm/s    MV E' Tissue Velocity Lateral 9 cm/s    E/A ratio 0.70     E wave deceleration time 150 ms    MV Peak E Donald 56 cm/s    MV Peak A Donald 0.8 m/s    AV LVOT peak gradient 4 mmHg    LVOT peak VTI 18.29 cm    LVOT peak donald 1 m/s    RVID d 4.7 cm    Tricuspid annular plane systolic excursion 1.90 cm    BENITO A4C 16.1 cm2    RAA A4C 15.6 cm2    LVOT diameter 1.6 cm    Aortic valve peak velocity 1.31 m/s    Ao VTI 22.58 cm    AV mean gradient 4 mmHg    LVOT mn grad 2.0 mmHg    AV peak gradient 7 mmHg    AV area by cont VTI 1.6 cm2    AV area peak donald 1.5 cm2    MV stenosis pressure 1/2 time 44 ms    MV valve area p 1/2 method 5.00     TR Peak Donald 1.8 m/s    Triscuspid Valve Regurgitation Peak Gradient 13.0 mmHg    Aortic valve mean velocity 10.20 m/s    Tricuspid valve peak regurgitation velocity 1.79 m/s    Left ventricular stroke volume (2D) 27.00 mL    IVS 1.1 cm    LEFT VENTRICLE SYSTOLIC VOLUME (MOD BIPLANE) 2D 19 mL    LV DIASTOLIC VOLUME (MOD BIPLANE) 2D 46 mL    Left Atrium Area-systolic Four Chamber 15.8 cm2    LVSV, 2D 27 mL    LVOT area 2.01 cm2    DVI 0.76     AV valve area 1.63 cm2    LV EF 65    CBC    Collection Time: 12/23/23  7:52 AM   Result Value Ref Range    WBC 5.49 4.31 - 10.16 Thousand/uL    RBC 3.64 (L) 3.81 - 5.12 Million/uL    Hemoglobin 11.9 11.5 - 15.4 g/dL     Hematocrit 34.8 34.8 - 46.1 %    MCV 96 82 - 98 fL    MCH 32.7 26.8 - 34.3 pg    MCHC 34.2 31.4 - 37.4 g/dL    RDW 13.3 11.6 - 15.1 %    Platelets 200 149 - 390 Thousands/uL    MPV 8.1 (L) 8.9 - 12.7 fL   Basic metabolic panel    Collection Time: 12/23/23  7:52 AM   Result Value Ref Range    Sodium 138 135 - 147 mmol/L    Potassium 4.8 3.5 - 5.3 mmol/L    Chloride 104 96 - 108 mmol/L    CO2 31 21 - 32 mmol/L    ANION GAP 3 mmol/L    BUN 16 5 - 25 mg/dL    Creatinine 0.76 0.60 - 1.30 mg/dL    Glucose 92 65 - 140 mg/dL    Calcium 8.7 8.4 - 10.2 mg/dL    eGFR 82 ml/min/1.73sq m     Imaging   Personally reviewed images and reports in PACs. MRI brain without contrast- Moderate-sized acute left middle cerebral artery infarction in the left temporoparietal junction, correlating to the hypodensity on recent head CTs. Moderate, chronic microangiopathy. Cystic encephalomalacia and gliosis in the anterior aspect of the right temporal lobe. Large left parotid mass redemonstrated. Benign and malignant etiologies are in the differential diagnosis. ENT follow-up advised.    VTE Prophylaxis: Enoxaparin (Lovenox)    Counseling / Coordination of Care  Total time spent today 45 minutes. Greater than 50% of total time was spent with the patient and / or family counseling and / or coordination of care. A description of the counseling / coordination of care: Time spent reviewing results of MRI brain with patient and her sister at bedside. Also discussed case with internal medicine team. Will ask for cardiology consult for further evaluation and consideration for HILLARY to further assess for stroke etiology.

## 2023-12-23 NOTE — ASSESSMENT & PLAN NOTE
Presented to the ED with 3 days of confusion and slurred speech  CTA head with age-indeterminate hypodensity in left middle cerebral artery territory in temporoparietal junction with focal hyperdense vessel sign indicative of vascular occlusion.  Additionally mild local sulcal effacement favoring a more recent/acute/subacute event  Admitted on stroke pathway  Received aspirin and Plavix bolus  Started on aspirin 81 mg daily, plavix 75 mg daily and atorvastatin 40 mg  Allow for permissive hypertension-hold home amlodipine  Monitor on telemetry   Continue neuro checks  Stat CT head for changes in neuro exam  MRI brain with moderate-sized acute left MCA infarct  Echo obtained, EF 65%, no PFO noted, valves not well visualized  Cardiology consulted for possible HILLARY however unsure if this will be able to be performed given patient's COVID-positive status  Neurology consulted  PT/OT  Speech evaluated- level 3 dysphagia

## 2023-12-23 NOTE — ASSESSMENT & PLAN NOTE
- Maintained on Carbamazepine 400 mg QHS.   - Carbamazepine level 7.   - Maintain seizure precautions.

## 2023-12-23 NOTE — ASSESSMENT & PLAN NOTE
- Has seen ENT and was scheduled for removal in 11/2023 but procedure was cancelled by anesthesiology due to concern for cardiac status. Patient was seen by cardiology and was awaiting echocardiogram for clearance.

## 2023-12-24 LAB
ANION GAP SERPL CALCULATED.3IONS-SCNC: 6 MMOL/L
BUN SERPL-MCNC: 15 MG/DL (ref 5–25)
CALCIUM SERPL-MCNC: 8.1 MG/DL (ref 8.4–10.2)
CHLORIDE SERPL-SCNC: 103 MMOL/L (ref 96–108)
CO2 SERPL-SCNC: 25 MMOL/L (ref 21–32)
CREAT SERPL-MCNC: 0.55 MG/DL (ref 0.6–1.3)
ERYTHROCYTE [DISTWIDTH] IN BLOOD BY AUTOMATED COUNT: 13.3 % (ref 11.6–15.1)
GFR SERPL CREATININE-BSD FRML MDRD: 98 ML/MIN/1.73SQ M
GLUCOSE SERPL-MCNC: 77 MG/DL (ref 65–140)
HCT VFR BLD AUTO: 32.7 % (ref 34.8–46.1)
HGB BLD-MCNC: 11.2 G/DL (ref 11.5–15.4)
MCH RBC QN AUTO: 32.8 PG (ref 26.8–34.3)
MCHC RBC AUTO-ENTMCNC: 34.3 G/DL (ref 31.4–37.4)
MCV RBC AUTO: 96 FL (ref 82–98)
PLATELET # BLD AUTO: 198 THOUSANDS/UL (ref 149–390)
PMV BLD AUTO: 8 FL (ref 8.9–12.7)
POTASSIUM SERPL-SCNC: 3.8 MMOL/L (ref 3.5–5.3)
RBC # BLD AUTO: 3.41 MILLION/UL (ref 3.81–5.12)
SODIUM SERPL-SCNC: 134 MMOL/L (ref 135–147)
WBC # BLD AUTO: 5.14 THOUSAND/UL (ref 4.31–10.16)

## 2023-12-24 PROCEDURE — 99232 SBSQ HOSP IP/OBS MODERATE 35: CPT | Performed by: PHYSICIAN ASSISTANT

## 2023-12-24 PROCEDURE — 80048 BASIC METABOLIC PNL TOTAL CA: CPT | Performed by: PHYSICIAN ASSISTANT

## 2023-12-24 PROCEDURE — 99223 1ST HOSP IP/OBS HIGH 75: CPT | Performed by: INTERNAL MEDICINE

## 2023-12-24 PROCEDURE — 85027 COMPLETE CBC AUTOMATED: CPT | Performed by: PHYSICIAN ASSISTANT

## 2023-12-24 RX ORDER — AMLODIPINE BESYLATE 5 MG/1
5 TABLET ORAL DAILY
Status: DISCONTINUED | OUTPATIENT
Start: 2023-12-24 | End: 2023-12-31 | Stop reason: HOSPADM

## 2023-12-24 RX ADMIN — ASPIRIN 81 MG CHEWABLE TABLET 81 MG: 81 TABLET CHEWABLE at 08:28

## 2023-12-24 RX ADMIN — CARBAMAZEPINE 400 MG: 200 TABLET ORAL at 21:15

## 2023-12-24 RX ADMIN — CLOPIDOGREL BISULFATE 75 MG: 75 TABLET ORAL at 08:28

## 2023-12-24 RX ADMIN — OLANZAPINE 40 MG: 10 TABLET, FILM COATED ORAL at 21:15

## 2023-12-24 RX ADMIN — ATORVASTATIN CALCIUM 40 MG: 40 TABLET, FILM COATED ORAL at 17:35

## 2023-12-24 RX ADMIN — ENOXAPARIN SODIUM 40 MG: 40 INJECTION SUBCUTANEOUS at 08:28

## 2023-12-24 RX ADMIN — AMLODIPINE BESYLATE 5 MG: 5 TABLET ORAL at 11:18

## 2023-12-24 NOTE — ASSESSMENT & PLAN NOTE
Presenting with hypertension urgency in the setting of acute stroke  Home regimen amlodipine 5 mg daily  Amlodipine on hold in the setting of CVA pathway to allow for permissive hypertension  Restart amlodipine today

## 2023-12-24 NOTE — PROGRESS NOTES
[facility]  Progress Note  Name: Elvi Castillo I  MRN: [id number]  Unit/Bed#: E4 -01 I Date of Admission: 12/20/2023   Date of Service: 12/24/2023 I Hospital Day: 4    Assessment/Plan   * Acute ischemic left MCA stroke (HCC)  Assessment & Plan  Presented to the ED with 3 days of confusion and slurred speech  CTA head with age-indeterminate hypodensity in left middle cerebral artery territory in temporoparietal junction with focal hyperdense vessel sign indicative of vascular occlusion.  Additionally mild local sulcal effacement favoring a more recent/acute/subacute event  Admitted on stroke pathway  Received aspirin and Plavix bolus  Started on aspirin 81 mg daily, plavix 75 mg daily and atorvastatin 40 mg  Allow for permissive hypertension-hold home amlodipine  Monitor on telemetry   Continue neuro checks  Stat CT head for changes in neuro exam  MRI brain with moderate-sized acute left MCA infarct  Echo obtained, EF 65%, no PFO noted, valves not well visualized  Cardiology consulted for possible HILLARY however unsure if this will be able to be performed given patient's COVID-positive status  Neurology consulted  PT/OT  Speech evaluated- level 3 dysphagia    History of brain tumor  Assessment & Plan  Per sister, pt has a history of a brain tumor that was resected in the patient's 20s  Currently patient lives with her sister who provides most care and assistance  At baseline patient is able to perform simple tasks like dress herself but does have memory loss  Worsened confusion and speech difficulties in the setting of acute CVA  Currently on CVA pathway    COVID-19  Assessment & Plan  Presented to the hospital with slurred speech and confusion x 3 days  Found to be COVID-positive  Asymptomatic and not hypoxic  CXR without infiltrate or consolidation  Therefore hold off on remdesivir      Parotid tumor  Assessment & Plan  CT head performed revealing 4.6 cm left parotid mass which was  previously imaged and is stable from August  Has seen ENT as an outpatient had biopsy performed 9/18/2023-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  For removal on 11/8/2023 however there was an issue with her preoperative EGD and surgery was not performed  Was scheduled to undergo additional cardiac workup prior to rescheduling removal of tumor   Echo with EF 65%  Ongoing outpt followup with ENT    Seizure disorder (HCC)  Assessment & Plan  Maintained on Tegretol for 100 mg twice daily    Schizoaffective disorder (HCC)  Assessment & Plan  Continue home regimen of Zyprexa 40 mg at bedtime    Hypertension  Assessment & Plan  Presenting with hypertension urgency in the setting of acute stroke  Home regimen amlodipine 5 mg daily  Amlodipine on hold in the setting of CVA pathway to allow for permissive hypertension  Restart amlodipine today         VTE Pharmacologic Prophylaxis:   Moderate Risk (Score 3-4) - Pharmacological DVT Prophylaxis Ordered: enoxaparin (Lovenox).    Mobility:   Basic Mobility Inpatient Raw Score: 21  JH-HLM Goal: 6: Walk 10 steps or more  JH-HLM Achieved: 6: Walk 10 steps or more  HLM Goal achieved. Continue to encourage appropriate mobility.    Patient Centered Rounds: I performed bedside rounds with nursing staff today.   Discussions with Specialists or Other Care Team Provider: Cardiology    Education and Discussions with Family / Patient: Updated  (sister) via phone.    Total Time Spent on Date of Encounter in care of patient: 35 mins. This time was spent on one or more of the following: performing physical exam; counseling and coordination of care; obtaining or reviewing history; documenting in the medical record; reviewing/ordering tests, medications or procedures; communicating with other healthcare professionals and discussing with patient's family/caregivers.    Current Length of Stay: 4 day(s)  Current Patient Status: Inpatient   Certification Statement: The patient will continue to  require additional inpatient hospital stay due to HILLARY  Discharge Plan: Anticipate discharge in 48 hrs to home with home services.    Code Status: Level 3 - DNAR and DNI    Subjective:   The patient is seen resting in bed. She was assisted in walking to the bathroom. She denies any pain at this time.    Objective:     Vitals:   Temp (24hrs), Av.9 °F (36.6 °C), Min:97.7 °F (36.5 °C), Max:98 °F (36.7 °C)    Temp:  [97.7 °F (36.5 °C)-98 °F (36.7 °C)] 98 °F (36.7 °C)  HR:  [65-96] 83  Resp:  [18] 18  BP: (136-150)/(73-80) 150/80  SpO2:  [97 %-100 %] 100 %  Body mass index is 20.9 kg/m².     Input and Output Summary (last 24 hours):   No intake or output data in the 24 hours ending 23 1941    Physical Exam:   Physical Exam  Vitals and nursing note reviewed.   Constitutional:       General: She is not in acute distress.     Appearance: She is not ill-appearing, toxic-appearing or diaphoretic.      Comments: unkempt   HENT:      Head: Normocephalic and atraumatic.   Cardiovascular:      Rate and Rhythm: Normal rate and regular rhythm.      Heart sounds: No murmur heard.     No friction rub. No gallop.   Pulmonary:      Effort: Pulmonary effort is normal. No respiratory distress.      Breath sounds: Normal breath sounds. No wheezing, rhonchi or rales.   Abdominal:      General: Abdomen is flat. Bowel sounds are normal. There is no distension.      Palpations: Abdomen is soft.      Tenderness: There is no abdominal tenderness.   Musculoskeletal:      Right lower leg: No edema.      Left lower leg: No edema.   Skin:     General: Skin is warm and dry.      Coloration: Skin is not jaundiced or pale.   Neurological:      General: No focal deficit present.      Mental Status: She is alert. Mental status is at baseline. She is disoriented.      Cranial Nerves: Dysarthria present. No facial asymmetry.      Motor: No weakness.      Gait: Gait normal.      Comments: Dysarthric, speech nonsensical at times. Can repeat her  name and that she is in the hospital, unable to repeat phrases back.        Additional Data:     Labs:  Results from last 7 days   Lab Units 12/24/23  0454 12/22/23  0726 12/20/23  0949   WBC Thousand/uL 5.14   < > 5.43   HEMOGLOBIN g/dL 11.2*   < > 11.9   HEMATOCRIT % 32.7*   < > 36.2   PLATELETS Thousands/uL 198   < > 199   NEUTROS PCT %  --   --  80*   LYMPHS PCT %  --   --  7*   MONOS PCT %  --   --  12   EOS PCT %  --   --  0    < > = values in this interval not displayed.     Results from last 7 days   Lab Units 12/24/23  0454 12/22/23  0726 12/20/23  0949   SODIUM mmol/L 134*   < > 137   POTASSIUM mmol/L 3.8   < > 3.3*   CHLORIDE mmol/L 103   < > 103   CO2 mmol/L 25   < > 31   BUN mg/dL 15   < > 19   CREATININE mg/dL 0.55*   < > 0.73   ANION GAP mmol/L 6   < > 3   CALCIUM mg/dL 8.1*   < > 8.7   ALBUMIN g/dL  --   --  3.6   TOTAL BILIRUBIN mg/dL  --   --  0.33   ALK PHOS U/L  --   --  85   ALT U/L  --   --  19   AST U/L  --   --  20   GLUCOSE RANDOM mg/dL 77   < > 71    < > = values in this interval not displayed.             Results from last 7 days   Lab Units 12/21/23  0542   HEMOGLOBIN A1C % 5.3           Lines/Drains:  Invasive Devices       Peripheral Intravenous Line  Duration             Peripheral IV 12/20/23 Left Antecubital 4 days                      Telemetry:  Telemetry Orders (From admission, onward)               24 Hour Telemetry Monitoring  Continuous x 24 Hours (Telem)        Expiring   Question:  Reason for 24 Hour Telemetry  Answer:  TIA/Suspected CVA/ Confirmed CVA                     Telemetry Reviewed: Normal Sinus Rhythm  Indication for Continued Telemetry Use: Acute CVA             Imaging: No pertinent imaging reviewed.    Recent Cultures (last 7 days):         Last 24 Hours Medication List:   Current Facility-Administered Medications   Medication Dose Route Frequency Provider Last Rate    aspirin  81 mg Oral Daily Jigna Salmon PA-C      atorvastatin  40 mg Oral QPM Jigna Salmon  VITA      carBAMazepine  400 mg Oral HS Jigna Salmon PA-C      clopidogrel  75 mg Oral Daily Truong Lombardo PA-C      enoxaparin  40 mg Subcutaneous Daily Jigna Salmon PA-C      OLANZapine  40 mg Oral HS Jigna Salmon PA-C      ondansetron  4 mg Intravenous Q6H PRN Jigna Salmon PA-C          Today, Patient Was Seen By: Truong Lombardo PA-C    **Please Note: This note may have been constructed using a voice recognition system.**

## 2023-12-24 NOTE — CONSULTS
Consult - Cardiology   Elvi Castillo 68 y.o. female MRN: [id number]  Unit/Bed#: E4 -01 Encounter: [id number]        Reason For Consult: Evaluation for HILLARY               ASSESSMENT:  Evaluation for HILLARY   - TTE 12/22/23: LVEF 65%, although no diagnostic regional wall motion abnormality identified, this possibility cannot be completely excluded on the basis of the study. Grade 1 diastolic dysfunction. RV cavity size mildly dilated. No PFO detected at rest with cough or with Valsalva using agitated saline contrast. Mild MR, mild TR.  Acute ischemic left MCA stroke   - CT head: Demonstrated small left posterior temporal parietal cortical hypodensity with hyperdense peripheral MCA vessel coursing through the parenchyma that may represent acute to subacute infarction in appropriate clinical context.  Positive COVID 19 infection   Hypertension    - outpatient Rx, amlodipine 5 mg daily.   Seizure disorder   History of brain tumor, with reported resection when patient was in her 20s  Schizoaffective disorder   Parotid tumor     PLAN/ DISCUSSION:     Cardiology consulted for evaluation of HILLARY given concern for embolic stroke.   As patient is COVID positive, will need to verify with Cardiologist performing the procedure and the team on Tuesday regarding this. Update to follow. Will need to speak with sister if HILLARY is planned to be pursued.  Currently on aspirin 81 mg daily, atorvastatin 40 mg daily, Plavix 75 mg daily.  Upon internal medicine note, plan to restart outpatient amlodipine 5 mg daily today. Antihypertensives initially held upon arrival due to CVA pathway.    History Of Present Illness:  68-year-old female presented to [facility] with confusion and slurred speech x 3 days prior to presentation. Upon arrival, history was obtained from the sister via telephone as patient had difficulty with her speech. Patient lives with her sister and at baseline is able to perform daily ADLs and dress herself.  However, she has been having worsening confusion and has been unable to perform the simple tasks. In light of this, sister was concerned and patient was brought to emergency department for further evaluation. On the emergency department, patient was found to have an abnormal CT scan of her brain with concern for stroke thus was placed on the stroke pathway  In addition, she was found to be COVID-positive. Given concern for embolic etiology of stroke, neurology considering HILLARY.    Upon assessment evaluation, patient resting comfortably in bed. Patient does not appear to be in acute distress. She denies any shortness of breath or chest pain.  When spoken to patient about possible procedure in the future and speaking with her sister, she nodded her head and said okay.    Please see significant cardiac history and problems enumerated above. Patient was seen by outpatient cardiology in November of this year for perioperative cardiovascular risk assessment for ENT surgery due to her parotid mass. As patient exhibited lower extremity edema, echocardiogram had been ordered for completeness. If echo had demonstrated normal LV systolic function without any severe valvular disease, patient was posed to be low to intermediate CV risk for procedure.     Past Medical History:        Past Medical History:   Diagnosis Date    History of drug overdose 2005    Hypertension     Memory loss     Schizophrenia (HCC)       Past Surgical History:   Procedure Laterality Date    BRAIN SURGERY      Brain Tumor surgery - age late 20's done @ [facility]    CARDIAC CATHETERIZATION  2015    HIP ARTHROPLASTY Right 01/15/2021    Procedure: HemiARTHROPLASTY HIP bipolar;  Surgeon: Mahesh Finch MD;  Location: [facility];  Service: Orthopedics    IA HEMIARTHROPLASTY HIP PARTIAL Right 01/15/2021    Procedure: HEMIARTHROPLASTY HIP (BIPOLAR);  Surgeon: Mahesh Finch MD;  Location: [facility];  Service: Orthopedics    US GUIDED LYMPH NODE BIOPSY LEFT   09/18/2023        Allergy:        Allergies   Allergen Reactions    Penicillins Hives       Medications:       Prior to Admission medications    Medication Sig Start Date End Date Taking? Authorizing Provider   amLODIPine (NORVASC) 5 mg tablet Take 1 tablet (5 mg total) by mouth daily 8/8/23  Yes Zelalem Barentt,    carBAMazepine (TEGretol) 200 mg tablet Take 400 mg by mouth daily at bedtime   Yes Historical Provider, MD   cholecalciferol (VITAMIN D3) 400 units tablet Take 400 Units by mouth daily   Yes Historical Provider, MD   multivitamin (THERAGRAN) TABS Take 1 tablet by mouth daily   Yes Historical Provider, MD   OLANZapine (ZyPREXA) 20 MG tablet Take 40 mg by mouth daily at bedtime  4/5/11  Yes Historical Provider, MD       Family History:     Family History   Problem Relation Age of Onset    Other Mother         Cardiac Arrest     Heart disease Mother     Other Father         cardiac Arrest     Heart disease Father         Social History:       Social History     Socioeconomic History    Marital status: Single     Spouse name: None    Number of children: None    Years of education: None    Highest education level: None   Occupational History    Occupation: disabled   Tobacco Use    Smoking status: Never    Smokeless tobacco: Never    Tobacco comments:     onset date: 8/2/2017    Vaping Use    Vaping status: Never Used   Substance and Sexual Activity    Alcohol use: Not Currently    Drug use: Not Currently    Sexual activity: Not Currently     Partners: Male   Other Topics Concern    None   Social History Narrative    None     Social Determinants of Health     Financial Resource Strain: Low Risk  (8/8/2023)    Overall Financial Resource Strain (CARDIA)     Difficulty of Paying Living Expenses: Not hard at all   Food Insecurity: No Food Insecurity (12/21/2023)    Hunger Vital Sign     Worried About Running Out of Food in the Last Year: Never true     Ran Out of Food in the Last Year: Never true   Transportation  Needs: No Transportation Needs (12/21/2023)    PRAPARE - Transportation     Lack of Transportation (Medical): No     Lack of Transportation (Non-Medical): No   Physical Activity: Not on file   Stress: Not on file   Social Connections: Not on file   Intimate Partner Violence: Not on file   Housing Stability: Low Risk  (12/21/2023)    Housing Stability Vital Sign     Unable to Pay for Housing in the Last Year: No     Number of Places Lived in the Last Year: 1     Unstable Housing in the Last Year: No     ROS:  Remainder review of systems is negative    Exam:  General:  alert, oriented and in no distress, cooperative  Head: Normocephalic, atraumatic.  Eyes:  EOMI. Pupils - equal, round, reactive to accomodation.  No icterus.  Normal Conjunctiva.   Oropharynx: moist and normal-appearing mucosa  Neck: supple, symmetrical, trachea midline   Heart: regular rate with controlled rhythm   Respiratory effort / Chest Inspection: unlabored  Lungs:  normal air entry, lungs clear to auscultation and no rales, rhonchi or wheezing   Abdomen: flat, normal findings: bowel sounds normal and soft, non-tender  Lower Limbs:  no pitting edema    DATA:      ECG:          Normal sinus rhythm  Inferior infarct (cited on or before 20-DEC-2023)  Abnormal ECG  When compared with ECG of 20-DEC-2023 11:52, (unconfirmed)  No significant change was found  Confirmed by Murtaza Su ([id number]) on 12/20/2023 5:14:22 PM             Telemetry: SR          Echocardiogram completed on 12/22/23:           Left Ventricle: Left ventricle is not well visualized. Wall thickness is mildly increased. The left ventricular ejection fraction is 65% by visual estimation. Systolic function is normal. Although no diagnostic regional wall motion abnormality was identified, this possibility cannot be completely excluded on the basis of this study. Diastolic function is mildly abnormal, consistent with grade I (abnormal) relaxation.    Right Ventricle: Right ventricle is not  well visualized. Right ventricular cavity size is mildly dilated.    Atrial Septum: No patent foramen ovale detected, at rest, with cough, or with Valsalva using agitated saline contrast.    Mitral Valve: There is mild regurgitation.    Tricuspid Valve: There is mild regurgitation.         Weights:    Wt Readings from Last 3 Encounters:   12/22/23 53.5 kg (118 lb)   11/21/23 54.3 kg (119 lb 9.6 oz)   11/08/23 54 kg (119 lb 0.8 oz)   , Body mass index is 20.9 kg/m².         Lab Studies:    Results from last 7 days   Lab Units 12/20/23  1839   CK TOTAL U/L 27        Results from last 7 days   Lab Units 12/21/23  0542   TRIGLYCERIDES mg/dL 57   HDL mg/dL 61     Results from last 7 days   Lab Units 12/24/23  0454 12/23/23  0752 12/22/23  0726   WBC Thousand/uL 5.14 5.49 4.80   HEMOGLOBIN g/dL 11.2* 11.9 11.8   HEMATOCRIT % 32.7* 34.8 36.1   PLATELETS Thousands/uL 198 200 196   ,   Results from last 7 days   Lab Units 12/24/23  0454 12/23/23  0752 12/22/23  0726 12/20/23  0949   POTASSIUM mmol/L 3.8 4.8 3.8 3.3*   CHLORIDE mmol/L 103 104 105 103   CO2 mmol/L 25 31 28 31   BUN mg/dL 15 16 15 19   CREATININE mg/dL 0.55* 0.76 0.62 0.73   CALCIUM mg/dL 8.1* 8.7 8.4 8.7   ALK PHOS U/L  --   --   --  85   ALT U/L  --   --   --  19   AST U/L  --   --   --  20

## 2023-12-25 LAB
ANION GAP SERPL CALCULATED.3IONS-SCNC: 7 MMOL/L
BUN SERPL-MCNC: 16 MG/DL (ref 5–25)
CALCIUM SERPL-MCNC: 9.1 MG/DL (ref 8.4–10.2)
CHLORIDE SERPL-SCNC: 99 MMOL/L (ref 96–108)
CO2 SERPL-SCNC: 29 MMOL/L (ref 21–32)
CREAT SERPL-MCNC: 0.7 MG/DL (ref 0.6–1.3)
ERYTHROCYTE [DISTWIDTH] IN BLOOD BY AUTOMATED COUNT: 13 % (ref 11.6–15.1)
GFR SERPL CREATININE-BSD FRML MDRD: 90 ML/MIN/1.73SQ M
GLUCOSE SERPL-MCNC: 84 MG/DL (ref 65–140)
HCT VFR BLD AUTO: 38.1 % (ref 34.8–46.1)
HGB BLD-MCNC: 13.2 G/DL (ref 11.5–15.4)
MCH RBC QN AUTO: 32.8 PG (ref 26.8–34.3)
MCHC RBC AUTO-ENTMCNC: 34.6 G/DL (ref 31.4–37.4)
MCV RBC AUTO: 95 FL (ref 82–98)
PLATELET # BLD AUTO: 266 THOUSANDS/UL (ref 149–390)
PMV BLD AUTO: 8.3 FL (ref 8.9–12.7)
POTASSIUM SERPL-SCNC: 4.1 MMOL/L (ref 3.5–5.3)
RBC # BLD AUTO: 4.02 MILLION/UL (ref 3.81–5.12)
SODIUM SERPL-SCNC: 135 MMOL/L (ref 135–147)
WBC # BLD AUTO: 6.34 THOUSAND/UL (ref 4.31–10.16)

## 2023-12-25 PROCEDURE — 85027 COMPLETE CBC AUTOMATED: CPT | Performed by: PHYSICIAN ASSISTANT

## 2023-12-25 PROCEDURE — 99232 SBSQ HOSP IP/OBS MODERATE 35: CPT | Performed by: PHYSICIAN ASSISTANT

## 2023-12-25 PROCEDURE — 80048 BASIC METABOLIC PNL TOTAL CA: CPT | Performed by: PHYSICIAN ASSISTANT

## 2023-12-25 RX ADMIN — AMLODIPINE BESYLATE 5 MG: 5 TABLET ORAL at 08:31

## 2023-12-25 RX ADMIN — OLANZAPINE 40 MG: 10 TABLET, FILM COATED ORAL at 21:01

## 2023-12-25 RX ADMIN — ATORVASTATIN CALCIUM 40 MG: 40 TABLET, FILM COATED ORAL at 17:07

## 2023-12-25 RX ADMIN — CLOPIDOGREL BISULFATE 75 MG: 75 TABLET ORAL at 08:31

## 2023-12-25 RX ADMIN — ASPIRIN 81 MG CHEWABLE TABLET 81 MG: 81 TABLET CHEWABLE at 08:31

## 2023-12-25 RX ADMIN — ENOXAPARIN SODIUM 40 MG: 40 INJECTION SUBCUTANEOUS at 08:31

## 2023-12-25 RX ADMIN — CARBAMAZEPINE 400 MG: 200 TABLET ORAL at 21:01

## 2023-12-25 NOTE — ASSESSMENT & PLAN NOTE
Presenting with hypertension urgency in the setting of acute stroke  Home regimen amlodipine 5 mg daily  Amlodipine on hold in the setting of CVA pathway to allow for permissive hypertension  Restarted amlodipine

## 2023-12-25 NOTE — ASSESSMENT & PLAN NOTE
Presenting with hypertension urgency in the setting of acute stroke  Home regimen amlodipine 5 mg daily  Amlodipine on hold in the setting of CVA pathway to allow for permissive hypertension  Restarted amlodipine yesterday

## 2023-12-25 NOTE — PLAN OF CARE
Problem: Potential for Falls  Goal: Patient will remain free of falls  Description: INTERVENTIONS:  - Educate patient/family on patient safety including physical limitations  - Instruct patient to call for assistance with activity   - Consult OT/PT to assist with strengthening/mobility   - Keep Call bell within reach  - Keep bed low and locked with side rails adjusted as appropriate  - Keep care items and personal belongings within reach  - Initiate and maintain comfort rounds  - Make Fall Risk Sign visible to staff  - Offer Toileting every 3 Hours, in advance of need  - Initiate/Maintain bed alarm  - Obtain necessary fall risk management equipment: alarm   - Apply yellow socks and bracelet for high fall risk patients  - Consider moving patient to room near nurses station  Outcome: Progressing     Problem: PAIN - ADULT  Goal: Verbalizes/displays adequate comfort level or baseline comfort level  Description: Interventions:  - Encourage patient to monitor pain and request assistance  - Assess pain using appropriate pain scale  - Administer analgesics based on type and severity of pain and evaluate response  - Implement non-pharmacological measures as appropriate and evaluate response  - Consider cultural and social influences on pain and pain management  - Notify physician/advanced practitioner if interventions unsuccessful or patient reports new pain  Outcome: Progressing     Problem: INFECTION - ADULT  Goal: Absence or prevention of progression during hospitalization  Description: INTERVENTIONS:  - Assess and monitor for signs and symptoms of infection  - Monitor lab/diagnostic results  - Monitor all insertion sites, i.e. indwelling lines, tubes, and drains  - Monitor endotracheal if appropriate and nasal secretions for changes in amount and color  - [place] appropriate cooling/warming therapies per order  - Administer medications as ordered  - Instruct and encourage patient and family to use good hand hygiene  technique  - Identify and instruct in appropriate isolation precautions for identified infection/condition  Outcome: Progressing  Goal: Absence of fever/infection during neutropenic period  Description: INTERVENTIONS:  - Monitor WBC    Outcome: Progressing     Problem: SAFETY ADULT  Goal: Patient will remain free of falls  Description: INTERVENTIONS:  - Educate patient/family on patient safety including physical limitations  - Instruct patient to call for assistance with activity   - Consult OT/PT to assist with strengthening/mobility   - Keep Call bell within reach  - Keep bed low and locked with side rails adjusted as appropriate  - Keep care items and personal belongings within reach  - Initiate and maintain comfort rounds  - Make Fall Risk Sign visible to staff  - Offer Toileting every 3 Hours, in advance of need  - Initiate/Maintain bed alarm  - Obtain necessary fall risk management equipment: alarm   - Apply yellow socks and bracelet for high fall risk patients  - Consider moving patient to room near nurses station  Outcome: Progressing  Goal: Maintain or return to baseline ADL function  Description: INTERVENTIONS:  -  Assess patient's ability to carry out ADLs; assess patient's baseline for ADL function and identify physical deficits which impact ability to perform ADLs (bathing, care of mouth/teeth, toileting, grooming, dressing, etc.)  - Assess/evaluate cause of self-care deficits   - Assess range of motion  - Assess patient's mobility; develop plan if impaired  - Assess patient's need for assistive devices and provide as appropriate  - Encourage maximum independence but intervene and supervise when necessary  - Involve family in performance of ADLs  - Assess for home care needs following discharge   - Consider OT consult to assist with ADL evaluation and planning for discharge  - Provide patient education as appropriate  Outcome: Progressing  Goal: Maintains/Returns to pre admission functional  level  Description: INTERVENTIONS:  - Perform AM-PAC 6 Click Basic Mobility/ Daily Activity assessment daily.  - Set and communicate daily mobility goal to care team and patient/family/caregiver.   - Collaborate with rehabilitation services on mobility goals if consulted  - Perform Range of Motion 3 times a day.  - Reposition patient every 3 hours.  - Dangle patient 3 times a day  - Stand patient 3 times a day  - Ambulate patient 3 times a day  - Out of bed to chair 3 times a day   - Out of bed for meals 3 times a day  - Out of bed for toileting  - Record patient progress and toleration of activity level   Outcome: Progressing     Problem: DISCHARGE PLANNING  Goal: Discharge to home or other facility with appropriate resources  Description: INTERVENTIONS:  - Identify barriers to discharge w/patient and caregiver  - Arrange for needed discharge resources and transportation as appropriate  - Identify discharge learning needs (meds, wound care, etc.)  - Arrange for interpretive services to assist at discharge as needed  - Refer to Case Management Department for coordinating discharge planning if the patient needs post-hospital services based on physician/advanced practitioner order or complex needs related to functional status, cognitive ability, or social support system  Outcome: Progressing     Problem: Knowledge Deficit  Goal: Patient/family/caregiver demonstrates understanding of disease process, treatment plan, medications, and discharge instructions  Description: Complete learning assessment and assess knowledge base.  Interventions:  - Provide teaching at level of understanding  - Provide teaching via preferred learning methods  Outcome: Progressing     Problem: Neurological Deficit  Goal: Neurological status is stable or improving  Description: Interventions:  - Monitor and assess patient's level of consciousness, motor function, sensory function, and level of assistance needed for ADLs.   - Monitor and report  changes from baseline. Collaborate with interdisciplinary team to initiate plan and implement interventions as ordered.   - Provide and maintain a safe environment.  - Consider seizure precautions.  - Consider fall precautions.  - Consider aspiration precautions.  - Consider bleeding precautions.  Outcome: Progressing     Problem: Activity Intolerance/Impaired Mobility  Goal: Mobility/activity is maintained at optimum level for patient  Description: Interventions:  - Assess and monitor patient  barriers to mobility and need for assistive/adaptive devices.  - Assess patient's emotional response to limitations.  - Collaborate with interdisciplinary team and initiate plans and interventions as ordered.  - Encourage independent activity per ability.  - Maintain proper body alignment.  - Perform active/passive rom as tolerated/ordered.  - Plan activities to conserve energy.  - Turn patient as appropriate  Outcome: Progressing     Problem: Communication Impairment  Goal: Ability to express needs and understand communication  Description: Assess patient's communication skills and ability to understand information.  Patient will demonstrate use of effective communication techniques, alternative methods of communication and understanding even if not able to speak.     - Encourage communication and provide alternate methods of communication as needed.  - Collaborate with case management/ for discharge needs.  - Include patient/family/caregiver in decisions related to communication.  Outcome: Progressing     Problem: Potential for Aspiration  Goal: Non-ventilated patient's risk of aspiration is minimized  Description: Assess and monitor vital signs, respiratory status, and labs (WBC).  Monitor for signs of aspiration (tachypnea, cough, rales, wheezing, cyanosis, fever).    - Assess and monitor patient's ability to swallow.  - Place patient up in chair to eat if possible.  - HOB up at 90 degrees to eat if unable  to get patient up into chair.  - Supervise patient during oral intake.   - Instruct patient/ family to take small bites.  - Instruct patient/ family to take small single sips when taking liquids.  - Follow patient-specific strategies generated by speech pathologist.  Outcome: Progressing  Goal: Ventilated patient's risk of aspiration is minimized  Description: Assess and monitor vital signs, respiratory status, airway cuff pressure, and labs (WBC).  Monitor for signs of aspiration (tachypnea, cough, rales, wheezing, cyanosis, fever).    - Elevate head of bed 30 degrees if patient has tube feeding.  - Monitor tube feeding.  Outcome: Progressing     Problem: Nutrition  Goal: Nutrition/Hydration status is improving  Description: Monitor and assess patient's nutrition/hydration status for malnutrition (ex- brittle hair, bruises, dry skin, pale skin and conjunctiva, muscle wasting, smooth red tongue, and disorientation). Collaborate with interdisciplinary team and initiate plan and interventions as ordered.  Monitor patient's weight and dietary intake as ordered or per policy. Utilize nutrition screening tool and intervene per policy. Determine patient's food preferences and provide high-protein, high-caloric foods as appropriate.     - Assist patient with eating.  - Allow adequate time for meals.  - Encourage patient to take dietary supplement as ordered.  - Collaborate with clinical nutritionist.  - Include patient/family/caregiver in decisions related to nutrition.  Outcome: Progressing

## 2023-12-25 NOTE — PLAN OF CARE
Problem: Potential for Falls  Goal: Patient will remain free of falls  Description: INTERVENTIONS:  - Educate patient/family on patient safety including physical limitations  - Instruct patient to call for assistance with activity   - Consult OT/PT to assist with strengthening/mobility   - Keep Call bell within reach  - Keep bed low and locked with side rails adjusted as appropriate  - Keep care items and personal belongings within reach  - Initiate and maintain comfort rounds  - Make Fall Risk Sign visible to staff  - Offer Toileting every  Hours, in advance of need  - Initiate/Maintain alarm  - Obtain necessary fall risk management equipment:   - Apply yellow socks and bracelet for high fall risk patients  - Consider moving patient to room near nurses station  Outcome: Progressing     Problem: PAIN - ADULT  Goal: Verbalizes/displays adequate comfort level or baseline comfort level  Description: Interventions:  - Encourage patient to monitor pain and request assistance  - Assess pain using appropriate pain scale  - Administer analgesics based on type and severity of pain and evaluate response  - Implement non-pharmacological measures as appropriate and evaluate response  - Consider cultural and social influences on pain and pain management  - Notify physician/advanced practitioner if interventions unsuccessful or patient reports new pain  Outcome: Progressing     Problem: INFECTION - ADULT  Goal: Absence or prevention of progression during hospitalization  Description: INTERVENTIONS:  - Assess and monitor for signs and symptoms of infection  - Monitor lab/diagnostic results  - Monitor all insertion sites, i.e. indwelling lines, tubes, and drains  - Monitor endotracheal if appropriate and nasal secretions for changes in amount and color  - [place] appropriate cooling/warming therapies per order  - Administer medications as ordered  - Instruct and encourage patient and family to use good hand hygiene technique  -  Identify and instruct in appropriate isolation precautions for identified infection/condition  Outcome: Progressing  Goal: Absence of fever/infection during neutropenic period  Description: INTERVENTIONS:  - Monitor WBC    Outcome: Progressing     Problem: SAFETY ADULT  Goal: Patient will remain free of falls  Description: INTERVENTIONS:  - Educate patient/family on patient safety including physical limitations  - Instruct patient to call for assistance with activity   - Consult OT/PT to assist with strengthening/mobility   - Keep Call bell within reach  - Keep bed low and locked with side rails adjusted as appropriate  - Keep care items and personal belongings within reach  - Initiate and maintain comfort rounds  - Make Fall Risk Sign visible to staff  - Offer Toileting every  Hours, in advance of need  - Initiate/Maintain alarm  - Obtain necessary fall risk management equipment:   - Apply yellow socks and bracelet for high fall risk patients  - Consider moving patient to room near nurses station  Outcome: Progressing  Goal: Maintain or return to baseline ADL function  Description: INTERVENTIONS:  -  Assess patient's ability to carry out ADLs; assess patient's baseline for ADL function and identify physical deficits which impact ability to perform ADLs (bathing, care of mouth/teeth, toileting, grooming, dressing, etc.)  - Assess/evaluate cause of self-care deficits   - Assess range of motion  - Assess patient's mobility; develop plan if impaired  - Assess patient's need for assistive devices and provide as appropriate  - Encourage maximum independence but intervene and supervise when necessary  - Involve family in performance of ADLs  - Assess for home care needs following discharge   - Consider OT consult to assist with ADL evaluation and planning for discharge  - Provide patient education as appropriate  Outcome: Progressing  Goal: Maintains/Returns to pre admission functional level  Description:  INTERVENTIONS:  - Perform AM-PAC 6 Click Basic Mobility/ Daily Activity assessment daily.  - Set and communicate daily mobility goal to care team and patient/family/caregiver.   - Collaborate with rehabilitation services on mobility goals if consulted  - Perform Range of Motion  times a day.  - Reposition patient every  hours.  - Dangle patient  times a day  - Stand patient  times a day  - Ambulate patient  times a day  - Out of bed to chair  times a day   - Out of bed for meals  times a day  - Out of bed for toileting  - Record patient progress and toleration of activity level   Outcome: Progressing     Problem: DISCHARGE PLANNING  Goal: Discharge to home or other facility with appropriate resources  Description: INTERVENTIONS:  - Identify barriers to discharge w/patient and caregiver  - Arrange for needed discharge resources and transportation as appropriate  - Identify discharge learning needs (meds, wound care, etc.)  - Arrange for interpretive services to assist at discharge as needed  - Refer to Case Management Department for coordinating discharge planning if the patient needs post-hospital services based on physician/advanced practitioner order or complex needs related to functional status, cognitive ability, or social support system  Outcome: Progressing     Problem: Knowledge Deficit  Goal: Patient/family/caregiver demonstrates understanding of disease process, treatment plan, medications, and discharge instructions  Description: Complete learning assessment and assess knowledge base.  Interventions:  - Provide teaching at level of understanding  - Provide teaching via preferred learning methods  Outcome: Progressing     Problem: Neurological Deficit  Goal: Neurological status is stable or improving  Description: Interventions:  - Monitor and assess patient's level of consciousness, motor function, sensory function, and level of assistance needed for ADLs.   - Monitor and report changes from baseline.  Collaborate with interdisciplinary team to initiate plan and implement interventions as ordered.   - Provide and maintain a safe environment.  - Consider seizure precautions.  - Consider fall precautions.  - Consider aspiration precautions.  - Consider bleeding precautions.  Outcome: Progressing     Problem: Activity Intolerance/Impaired Mobility  Goal: Mobility/activity is maintained at optimum level for patient  Description: Interventions:  - Assess and monitor patient  barriers to mobility and need for assistive/adaptive devices.  - Assess patient's emotional response to limitations.  - Collaborate with interdisciplinary team and initiate plans and interventions as ordered.  - Encourage independent activity per ability.  - Maintain proper body alignment.  - Perform active/passive rom as tolerated/ordered.  - Plan activities to conserve energy.  - Turn patient as appropriate  Outcome: Progressing     Problem: Communication Impairment  Goal: Ability to express needs and understand communication  Description: Assess patient's communication skills and ability to understand information.  Patient will demonstrate use of effective communication techniques, alternative methods of communication and understanding even if not able to speak.     - Encourage communication and provide alternate methods of communication as needed.  - Collaborate with case management/ for discharge needs.  - Include patient/family/caregiver in decisions related to communication.  Outcome: Progressing     Problem: Potential for Aspiration  Goal: Non-ventilated patient's risk of aspiration is minimized  Description: Assess and monitor vital signs, respiratory status, and labs (WBC).  Monitor for signs of aspiration (tachypnea, cough, rales, wheezing, cyanosis, fever).    - Assess and monitor patient's ability to swallow.  - Place patient up in chair to eat if possible.  - HOB up at 90 degrees to eat if unable to get patient up into  chair.  - Supervise patient during oral intake.   - Instruct patient/ family to take small bites.  - Instruct patient/ family to take small single sips when taking liquids.  - Follow patient-specific strategies generated by speech pathologist.  Outcome: Progressing  Goal: Ventilated patient's risk of aspiration is minimized  Description: Assess and monitor vital signs, respiratory status, airway cuff pressure, and labs (WBC).  Monitor for signs of aspiration (tachypnea, cough, rales, wheezing, cyanosis, fever).    - Elevate head of bed 30 degrees if patient has tube feeding.  - Monitor tube feeding.  Outcome: Progressing     Problem: Nutrition  Goal: Nutrition/Hydration status is improving  Description: Monitor and assess patient's nutrition/hydration status for malnutrition (ex- brittle hair, bruises, dry skin, pale skin and conjunctiva, muscle wasting, smooth red tongue, and disorientation). Collaborate with interdisciplinary team and initiate plan and interventions as ordered.  Monitor patient's weight and dietary intake as ordered or per policy. Utilize nutrition screening tool and intervene per policy. Determine patient's food preferences and provide high-protein, high-caloric foods as appropriate.     - Assist patient with eating.  - Allow adequate time for meals.  - Encourage patient to take dietary supplement as ordered.  - Collaborate with clinical nutritionist.  - Include patient/family/caregiver in decisions related to nutrition.  Outcome: Progressing

## 2023-12-25 NOTE — ASSESSMENT & PLAN NOTE
Presented to the ED with 3 days of confusion and slurred speech  CTA head with age-indeterminate hypodensity in left middle cerebral artery territory in temporoparietal junction with focal hyperdense vessel sign indicative of vascular occlusion.  Additionally mild local sulcal effacement favoring a more recent/acute/subacute event  Admitted on stroke pathway  Received aspirin and Plavix bolus  Started on aspirin 81 mg daily, plavix 75 mg daily and atorvastatin 40 mg  Continue DAPT  Allow for permissive hypertension-hold home amlodipine  Monitor on telemetry   Continue neuro checks  Stat CT head for changes in neuro exam  MRI brain with moderate-sized acute left MCA infarct  Echo obtained, EF 65%, no PFO noted, valves not well visualized  Cardiology consulted for possible HILLARY however unable to be performed due to patient's COVID-positive status  Cardiology called patient's sister on the phone, Martha, regarding possible Zio patch.  She does not wish to pursue outpatient monitoring or HILLARY.  Neurology consulted  PT/OT recommending home with home health  Discussed with case management, patient's sister reporting that she is unable to care for her any longer and is requesting nursing home placement  Speech evaluated- level 3 dysphagia

## 2023-12-25 NOTE — PROGRESS NOTES
[facility]  Progress Note  Name: Elvi Castillo I  MRN: [id number]  Unit/Bed#: E4 -01 I Date of Admission: 12/20/2023   Date of Service: 12/26/2023 I Hospital Day: 6    Assessment/Plan   * Acute ischemic left MCA stroke (HCC)  Assessment & Plan  Presented to the ED with 3 days of confusion and slurred speech  CTA head with age-indeterminate hypodensity in left middle cerebral artery territory in temporoparietal junction with focal hyperdense vessel sign indicative of vascular occlusion.  Additionally mild local sulcal effacement favoring a more recent/acute/subacute event  Admitted on stroke pathway  Received aspirin and Plavix bolus  Started on aspirin 81 mg daily, plavix 75 mg daily and atorvastatin 40 mg  Continue DAPT  Allow for permissive hypertension-hold home amlodipine  Monitor on telemetry   Continue neuro checks  Stat CT head for changes in neuro exam  MRI brain with moderate-sized acute left MCA infarct  Echo obtained, EF 65%, no PFO noted, valves not well visualized  Cardiology consulted for possible HILLARY however unable to be performed due to patient's COVID-positive status  Cardiology called patient's sister on the phone, Martha, regarding possible Zio patch.  She does not wish to pursue outpatient monitoring or HILLARY.  Neurology consulted  PT/OT recommending home with home health  Discussed with case management, patient's sister reporting that she is unable to care for her any longer and is requesting nursing home placement  Speech evaluated- level 3 dysphagia    History of brain tumor  Assessment & Plan  Per sister, pt has a history of a brain tumor that was resected in the patient's 20s  Currently patient lives with her sister who provides most care and assistance  At baseline patient is able to perform simple tasks like dress herself but does have memory loss  Worsened confusion and speech difficulties in the setting of acute CVA  Currently on CVA  pathway    COVID-19  Assessment & Plan  Presented to the hospital with slurred speech and confusion x 3 days  Found to be COVID-positive  Asymptomatic and not hypoxic  CXR without infiltrate or consolidation  Therefore hold off on remdesivir      Parotid tumor  Assessment & Plan  CT head performed revealing 4.6 cm left parotid mass which was previously imaged and is stable from August  Has seen ENT as an outpatient had biopsy performed 9/18/2023-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  For removal on 11/8/2023 however there was an issue with her preoperative EGD and surgery was not performed  Was scheduled to undergo additional cardiac workup prior to rescheduling removal of tumor   Echo with EF 65%  Ongoing outpt followup with ENT    Seizure disorder (HCC)  Assessment & Plan  Maintained on Tegretol for 100 mg twice daily    Schizoaffective disorder (HCC)  Assessment & Plan  Continue home regimen of Zyprexa 40 mg at bedtime    Hypertension  Assessment & Plan  Presenting with hypertension urgency in the setting of acute stroke  Home regimen amlodipine 5 mg daily  Amlodipine on hold in the setting of CVA pathway to allow for permissive hypertension  Restarted amlodipine    Hypokalemia-resolved as of 12/22/2023  Assessment & Plan  Replaced. Check labs in am.   resolved         VTE Pharmacologic Prophylaxis:   Moderate Risk (Score 3-4) - Pharmacological DVT Prophylaxis Ordered: enoxaparin (Lovenox).    Mobility:   Basic Mobility Inpatient Raw Score: 21  JH-HLM Goal: 6: Walk 10 steps or more  JH-HLM Achieved: 1: Laying in bed  HLM Goal NOT achieved. Continue with multidisciplinary rounding and encourage appropriate mobility to improve upon HLM goals.    Patient Centered Rounds: I performed bedside rounds with nursing staff today.   Discussions with Specialists or Other Care Team Provider: cardiology, neuro, CM    Education and Discussions with Family / Patient: Updated  (sister) via phone.    Total Time Spent on Date  of Encounter in care of patient: 35 mins. This time was spent on one or more of the following: performing physical exam; counseling and coordination of care; obtaining or reviewing history; documenting in the medical record; reviewing/ordering tests, medications or procedures; communicating with other healthcare professionals and discussing with patient's family/caregivers.    Current Length of Stay: 6 day(s)  Current Patient Status: Inpatient   Certification Statement: The patient will continue to require additional inpatient hospital stay due to safe dc planning  Discharge Plan: Anticipate discharge tomorrow to possibly home with home health    Code Status: Level 3 - DNAR and DNI    Subjective:   The patient is seen resting comfortably in bed.  She offers no complaints at this time, she is feeling well overall.  Denies any nausea, vomiting, diarrhea, constipation.  Denies any pain    Objective:     Vitals:   Temp (24hrs), Av.6 °F (37 °C), Min:97.1 °F (36.2 °C), Max:99.2 °F (37.3 °C)    Temp:  [97.1 °F (36.2 °C)-99.2 °F (37.3 °C)] 98.4 °F (36.9 °C)  HR:  [71-90] 79  Resp:  [10-18] 18  BP: (108-151)/(65-76) 119/76  SpO2:  [98 %-100 %] 99 %  Body mass index is 20.9 kg/m².     Input and Output Summary (last 24 hours):     Intake/Output Summary (Last 24 hours) at 2023 1121  Last data filed at 2023 1126  Gross per 24 hour   Intake --   Output 500 ml   Net -500 ml       Physical Exam:   Physical Exam  Vitals and nursing note reviewed.   Constitutional:       General: She is not in acute distress.     Appearance: Normal appearance. She is not ill-appearing, toxic-appearing or diaphoretic.   HENT:      Head: Normocephalic and atraumatic.   Cardiovascular:      Rate and Rhythm: Normal rate and regular rhythm.      Heart sounds: No murmur heard.     No friction rub. No gallop.   Pulmonary:      Effort: Pulmonary effort is normal. No respiratory distress.      Breath sounds: Normal breath sounds. No wheezing,  rhonchi or rales.   Abdominal:      General: Abdomen is flat. Bowel sounds are normal. There is no distension.      Palpations: Abdomen is soft.      Tenderness: There is no abdominal tenderness.   Musculoskeletal:      Right lower leg: No edema.      Left lower leg: No edema.   Skin:     General: Skin is warm and dry.      Coloration: Skin is not jaundiced or pale.   Neurological:      General: No focal deficit present.      Mental Status: She is alert. Mental status is at baseline.      Comments: Speech nonsensical at times, dysarthric        Additional Data:     Labs:  Results from last 7 days   Lab Units 12/26/23  0443 12/22/23  0726 12/20/23  0949   WBC Thousand/uL 6.46   < > 5.43   HEMOGLOBIN g/dL 11.6   < > 11.9   HEMATOCRIT % 34.4*   < > 36.2   PLATELETS Thousands/uL 229   < > 199   NEUTROS PCT %  --   --  80*   LYMPHS PCT %  --   --  7*   MONOS PCT %  --   --  12   EOS PCT %  --   --  0    < > = values in this interval not displayed.     Results from last 7 days   Lab Units 12/26/23  0443 12/22/23  0726 12/20/23  0949   SODIUM mmol/L 136   < > 137   POTASSIUM mmol/L 4.1   < > 3.3*   CHLORIDE mmol/L 104   < > 103   CO2 mmol/L 28   < > 31   BUN mg/dL 21   < > 19   CREATININE mg/dL 0.73   < > 0.73   ANION GAP mmol/L 4   < > 3   CALCIUM mg/dL 8.4   < > 8.7   ALBUMIN g/dL  --   --  3.6   TOTAL BILIRUBIN mg/dL  --   --  0.33   ALK PHOS U/L  --   --  85   ALT U/L  --   --  19   AST U/L  --   --  20   GLUCOSE RANDOM mg/dL 86   < > 71    < > = values in this interval not displayed.             Results from last 7 days   Lab Units 12/21/23  0542   HEMOGLOBIN A1C % 5.3           Lines/Drains:  Invasive Devices       Peripheral Intravenous Line  Duration             Peripheral IV 12/20/23 Left Antecubital 6 days                      Telemetry:  Telemetry Orders (From admission, onward)               24 Hour Telemetry Monitoring  Continuous x 24 Hours (Telem)        Expiring   Question:  Reason for 24 Hour Telemetry   Answer:  TIA/Suspected CVA/ Confirmed CVA                     Telemetry Reviewed: Normal Sinus Rhythm  Indication for Continued Telemetry Use: Acute CVA             Imaging: No pertinent imaging reviewed.    Recent Cultures (last 7 days):         Last 24 Hours Medication List:   Current Facility-Administered Medications   Medication Dose Route Frequency Provider Last Rate    amLODIPine  5 mg Oral Daily Truong Lombardo PA-C      aspirin  81 mg Oral Daily Jigna Salmon PA-C      atorvastatin  40 mg Oral QPM Jigna Salmon PA-C      carBAMazepine  400 mg Oral HS Jigna Salmon PA-C      clopidogrel  75 mg Oral Daily Truong Lombardo PA-C      enoxaparin  40 mg Subcutaneous Daily Jigna Salmon PA-C      OLANZapine  40 mg Oral HS Jigna Salmon PA-C      ondansetron  4 mg Intravenous Q6H PRN Jigna Salmon PA-C          Today, Patient Was Seen By: Truong Lombardo PA-C    **Please Note: This note may have been constructed using a voice recognition system.**

## 2023-12-25 NOTE — ASSESSMENT & PLAN NOTE
Presented to the ED with 3 days of confusion and slurred speech  CTA head with age-indeterminate hypodensity in left middle cerebral artery territory in temporoparietal junction with focal hyperdense vessel sign indicative of vascular occlusion.  Additionally mild local sulcal effacement favoring a more recent/acute/subacute event  Admitted on stroke pathway  Received aspirin and Plavix bolus  Started on aspirin 81 mg daily, plavix 75 mg daily and atorvastatin 40 mg  Continue DAPT  Allow for permissive hypertension-hold home amlodipine  Monitor on telemetry   Continue neuro checks  Stat CT head for changes in neuro exam  MRI brain with moderate-sized acute left MCA infarct  Echo obtained, EF 65%, no PFO noted, valves not well visualized  Cardiology consulted for possible HILLARY however unsure if this will be able to be performed given patient's COVID-positive status  If HILLARY unable to be performed, will need loop recorder  Neurology consulted  PT/OT  Speech evaluated- level 3 dysphagia

## 2023-12-25 NOTE — PROGRESS NOTES
[facility]  Progress Note  Name: Elvi Castillo I  MRN: [id number]  Unit/Bed#: E4 -01 I Date of Admission: 12/20/2023   Date of Service: 12/25/2023 I Hospital Day: 5    Assessment/Plan   * Acute ischemic left MCA stroke (HCC)  Assessment & Plan  Presented to the ED with 3 days of confusion and slurred speech  CTA head with age-indeterminate hypodensity in left middle cerebral artery territory in temporoparietal junction with focal hyperdense vessel sign indicative of vascular occlusion.  Additionally mild local sulcal effacement favoring a more recent/acute/subacute event  Admitted on stroke pathway  Received aspirin and Plavix bolus  Started on aspirin 81 mg daily, plavix 75 mg daily and atorvastatin 40 mg  Continue DAPT  Allow for permissive hypertension-hold home amlodipine  Monitor on telemetry   Continue neuro checks  Stat CT head for changes in neuro exam  MRI brain with moderate-sized acute left MCA infarct  Echo obtained, EF 65%, no PFO noted, valves not well visualized  Cardiology consulted for possible HILLARY however unsure if this will be able to be performed given patient's COVID-positive status  If HILLARY unable to be performed, will need loop recorder  Neurology consulted  PT/OT  Speech evaluated- level 3 dysphagia    History of brain tumor  Assessment & Plan  Per sister, pt has a history of a brain tumor that was resected in the patient's 20s  Currently patient lives with her sister who provides most care and assistance  At baseline patient is able to perform simple tasks like dress herself but does have memory loss  Worsened confusion and speech difficulties in the setting of acute CVA  Currently on CVA pathway    COVID-19  Assessment & Plan  Presented to the hospital with slurred speech and confusion x 3 days  Found to be COVID-positive  Asymptomatic and not hypoxic  CXR without infiltrate or consolidation  Therefore hold off on remdesivir      Parotid tumor  Assessment  & Plan  CT head performed revealing 4.6 cm left parotid mass which was previously imaged and is stable from August  Has seen ENT as an outpatient had biopsy performed 9/18/2023-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  For removal on 11/8/2023 however there was an issue with her preoperative EGD and surgery was not performed  Was scheduled to undergo additional cardiac workup prior to rescheduling removal of tumor   Echo with EF 65%  Ongoing outpt followup with ENT    Seizure disorder (HCC)  Assessment & Plan  Maintained on Tegretol for 100 mg twice daily    Schizoaffective disorder (HCC)  Assessment & Plan  Continue home regimen of Zyprexa 40 mg at bedtime    Hypertension  Assessment & Plan  Presenting with hypertension urgency in the setting of acute stroke  Home regimen amlodipine 5 mg daily  Amlodipine on hold in the setting of CVA pathway to allow for permissive hypertension  Restarted amlodipine yesterday             VTE Pharmacologic Prophylaxis:   Moderate Risk (Score 3-4) - Pharmacological DVT Prophylaxis Ordered: enoxaparin (Lovenox).    Mobility:   Basic Mobility Inpatient Raw Score: 21  -HLM Goal: 6: Walk 10 steps or more  JH-HLM Achieved: 1: Laying in bed  HLM Goal achieved. Continue to encourage appropriate mobility.    Patient Centered Rounds: I performed bedside rounds with nursing staff today.   Discussions with Specialists or Other Care Team Provider: None    Education and Discussions with Family / Patient: Updated  (sister) via phone.    Total Time Spent on Date of Encounter in care of patient: 25 mins. This time was spent on one or more of the following: performing physical exam; counseling and coordination of care; obtaining or reviewing history; documenting in the medical record; reviewing/ordering tests, medications or procedures; communicating with other healthcare professionals and discussing with patient's family/caregivers.    Current Length of Stay: 5 day(s)  Current Patient Status:  Inpatient   Certification Statement: The patient will continue to require additional inpatient hospital stay due to kallie vs loop  Discharge Plan: Anticipate discharge in 24-48 hrs to home with home services.    Code Status: Level 3 - DNAR and DNI    Subjective:   The patient is seen resting comfortably in bed.  She offers no complaints at this time, states that she took 2 pills this morning, is feeling well overall.  Denies any pain.    Objective:     Vitals:   Temp (24hrs), Av.7 °F (36.5 °C), Min:97.3 °F (36.3 °C), Max:98.2 °F (36.8 °C)    Temp:  [97.3 °F (36.3 °C)-98.2 °F (36.8 °C)] 97.9 °F (36.6 °C)  HR:  [78-90] 89  Resp:  [16-18] 18  BP: (104-161)/(57-85) 118/76  SpO2:  [98 %-100 %] 99 %  Body mass index is 20.9 kg/m².     Input and Output Summary (last 24 hours):   No intake or output data in the 24 hours ending 23 1003    Physical Exam:   Physical Exam  Vitals and nursing note reviewed.   Constitutional:       General: She is not in acute distress.     Appearance: Normal appearance. She is not ill-appearing, toxic-appearing or diaphoretic.      Comments: Unkempt   HENT:      Head: Normocephalic and atraumatic.   Cardiovascular:      Rate and Rhythm: Normal rate and regular rhythm.      Heart sounds: No murmur heard.     No friction rub. No gallop.   Pulmonary:      Effort: Pulmonary effort is normal. No respiratory distress.      Breath sounds: Normal breath sounds. No wheezing, rhonchi or rales.   Abdominal:      General: Abdomen is flat. Bowel sounds are normal. There is no distension.      Palpations: Abdomen is soft.      Tenderness: There is no abdominal tenderness.   Musculoskeletal:      Right lower leg: No edema.      Left lower leg: No edema.   Skin:     General: Skin is warm and dry.      Coloration: Skin is not jaundiced or pale.   Neurological:      General: No focal deficit present.      Mental Status: She is alert. Mental status is at baseline.      Comments: Speech nonsensical at  times, dysarthria          Additional Data:     Labs:  Results from last 7 days   Lab Units 12/25/23 0452 12/22/23  0726 12/20/23  0949   WBC Thousand/uL 6.34   < > 5.43   HEMOGLOBIN g/dL 13.2   < > 11.9   HEMATOCRIT % 38.1   < > 36.2   PLATELETS Thousands/uL 266   < > 199   NEUTROS PCT %  --   --  80*   LYMPHS PCT %  --   --  7*   MONOS PCT %  --   --  12   EOS PCT %  --   --  0    < > = values in this interval not displayed.     Results from last 7 days   Lab Units 12/25/23  0452 12/22/23  0726 12/20/23  0949   SODIUM mmol/L 135   < > 137   POTASSIUM mmol/L 4.1   < > 3.3*   CHLORIDE mmol/L 99   < > 103   CO2 mmol/L 29   < > 31   BUN mg/dL 16   < > 19   CREATININE mg/dL 0.70   < > 0.73   ANION GAP mmol/L 7   < > 3   CALCIUM mg/dL 9.1   < > 8.7   ALBUMIN g/dL  --   --  3.6   TOTAL BILIRUBIN mg/dL  --   --  0.33   ALK PHOS U/L  --   --  85   ALT U/L  --   --  19   AST U/L  --   --  20   GLUCOSE RANDOM mg/dL 84   < > 71    < > = values in this interval not displayed.             Results from last 7 days   Lab Units 12/21/23  0542   HEMOGLOBIN A1C % 5.3           Lines/Drains:  Invasive Devices       Peripheral Intravenous Line  Duration             Peripheral IV 12/20/23 Left Antecubital 5 days                      Telemetry:  Telemetry Orders (From admission, onward)               24 Hour Telemetry Monitoring  Continuous x 24 Hours (Telem)        Question:  Reason for 24 Hour Telemetry  Answer:  TIA/Suspected CVA/ Confirmed CVA                     Telemetry Reviewed: Normal Sinus Rhythm  Indication for Continued Telemetry Use: Acute CVA             Imaging: No pertinent imaging reviewed.    Recent Cultures (last 7 days):         Last 24 Hours Medication List:   Current Facility-Administered Medications   Medication Dose Route Frequency Provider Last Rate    amLODIPine  5 mg Oral Daily Truong Lombardo PA-C      aspirin  81 mg Oral Daily Jigna Salmon PA-C      atorvastatin  40 mg Oral QPM Jigna Salmon PA-C       carBAMazepine  400 mg Oral HS Jigna Salmon PA-C      clopidogrel  75 mg Oral Daily Truong Lombardo PA-C      enoxaparin  40 mg Subcutaneous Daily Jigna Salmon PA-C      OLANZapine  40 mg Oral HS Jigna Salmon PA-C      ondansetron  4 mg Intravenous Q6H PRN Jigna Salmon PA-C          Today, Patient Was Seen By: Truong Lombardo PA-C    **Please Note: This note may have been constructed using a voice recognition system.**

## 2023-12-26 ENCOUNTER — APPOINTMENT (INPATIENT)
Dept: NON INVASIVE DIAGNOSTICS | Facility: HOSPITAL | Age: 68
DRG: 064 | End: 2023-12-26
Payer: MEDICARE

## 2023-12-26 ENCOUNTER — TELEPHONE (OUTPATIENT)
Dept: CARDIOLOGY CLINIC | Facility: CLINIC | Age: 68
End: 2023-12-26

## 2023-12-26 DIAGNOSIS — I63.412 CEREBROVASCULAR ACCIDENT (CVA) DUE TO EMBOLISM OF LEFT MIDDLE CEREBRAL ARTERY (HCC): Primary | ICD-10-CM

## 2023-12-26 LAB
ANION GAP SERPL CALCULATED.3IONS-SCNC: 4 MMOL/L
BUN SERPL-MCNC: 21 MG/DL (ref 5–25)
CALCIUM SERPL-MCNC: 8.4 MG/DL (ref 8.4–10.2)
CHLORIDE SERPL-SCNC: 104 MMOL/L (ref 96–108)
CO2 SERPL-SCNC: 28 MMOL/L (ref 21–32)
CREAT SERPL-MCNC: 0.73 MG/DL (ref 0.6–1.3)
ERYTHROCYTE [DISTWIDTH] IN BLOOD BY AUTOMATED COUNT: 13.1 % (ref 11.6–15.1)
GFR SERPL CREATININE-BSD FRML MDRD: 86 ML/MIN/1.73SQ M
GLUCOSE SERPL-MCNC: 86 MG/DL (ref 65–140)
HCT VFR BLD AUTO: 34.4 % (ref 34.8–46.1)
HGB BLD-MCNC: 11.6 G/DL (ref 11.5–15.4)
MCH RBC QN AUTO: 32.2 PG (ref 26.8–34.3)
MCHC RBC AUTO-ENTMCNC: 33.7 G/DL (ref 31.4–37.4)
MCV RBC AUTO: 96 FL (ref 82–98)
PLATELET # BLD AUTO: 229 THOUSANDS/UL (ref 149–390)
PMV BLD AUTO: 8.3 FL (ref 8.9–12.7)
POTASSIUM SERPL-SCNC: 4.1 MMOL/L (ref 3.5–5.3)
RBC # BLD AUTO: 3.6 MILLION/UL (ref 3.81–5.12)
SODIUM SERPL-SCNC: 136 MMOL/L (ref 135–147)
WBC # BLD AUTO: 6.46 THOUSAND/UL (ref 4.31–10.16)

## 2023-12-26 PROCEDURE — 99232 SBSQ HOSP IP/OBS MODERATE 35: CPT | Performed by: INTERNAL MEDICINE

## 2023-12-26 PROCEDURE — 93970 EXTREMITY STUDY: CPT

## 2023-12-26 PROCEDURE — 93970 EXTREMITY STUDY: CPT | Performed by: SURGERY

## 2023-12-26 PROCEDURE — 80048 BASIC METABOLIC PNL TOTAL CA: CPT | Performed by: PHYSICIAN ASSISTANT

## 2023-12-26 PROCEDURE — 99232 SBSQ HOSP IP/OBS MODERATE 35: CPT | Performed by: PHYSICIAN ASSISTANT

## 2023-12-26 PROCEDURE — 85027 COMPLETE CBC AUTOMATED: CPT | Performed by: PHYSICIAN ASSISTANT

## 2023-12-26 RX ADMIN — AMLODIPINE BESYLATE 5 MG: 5 TABLET ORAL at 08:39

## 2023-12-26 RX ADMIN — CARBAMAZEPINE 400 MG: 200 TABLET ORAL at 21:28

## 2023-12-26 RX ADMIN — OLANZAPINE 40 MG: 10 TABLET, FILM COATED ORAL at 21:28

## 2023-12-26 RX ADMIN — ENOXAPARIN SODIUM 40 MG: 40 INJECTION SUBCUTANEOUS at 08:38

## 2023-12-26 RX ADMIN — ASPIRIN 81 MG CHEWABLE TABLET 81 MG: 81 TABLET CHEWABLE at 08:38

## 2023-12-26 RX ADMIN — ATORVASTATIN CALCIUM 40 MG: 40 TABLET, FILM COATED ORAL at 18:30

## 2023-12-26 RX ADMIN — CLOPIDOGREL BISULFATE 75 MG: 75 TABLET ORAL at 08:39

## 2023-12-26 NOTE — TELEPHONE ENCOUNTER
----- Message from Randi Gordillo MA sent at 12/26/2023  9:50 AM EST -----    ----- Message -----  From: Serjio Blankenship DO  Sent: 12/26/2023   9:36 AM EST  To: Chavo Moore MD; Randi Gordillo MA    Patient is here and [place] post CVA.  Needs implantable loop recorder.  Getting a ZIO AT patch at discharge, but I know it would likely take an extended period of time to schedule the device placement.  Maybe we can schedule her in the next month or so and we will follow-up with her in the office before then to go over the results of the patch.  Would let you know if anything changes to interfere with loop recorder placement.  Thank you.

## 2023-12-26 NOTE — PLAN OF CARE
Problem: Potential for Falls  Goal: Patient will remain free of falls  Description: INTERVENTIONS:  - Educate patient/family on patient safety including physical limitations  - Instruct patient to call for assistance with activity   - Consult OT/PT to assist with strengthening/mobility   - Keep Call bell within reach  - Keep bed low and locked with side rails adjusted as appropriate  - Keep care items and personal belongings within reach  - Initiate and maintain comfort rounds  - Make Fall Risk Sign visible to staff  - Offer Toileting every  Hours, in advance of need  - Initiate/Maintain alarm  - Obtain necessary fall risk management equipment:   - Apply yellow socks and bracelet for high fall risk patients  - Consider moving patient to room near nurses station  Outcome: Progressing     Problem: PAIN - ADULT  Goal: Verbalizes/displays adequate comfort level or baseline comfort level  Description: Interventions:  - Encourage patient to monitor pain and request assistance  - Assess pain using appropriate pain scale  - Administer analgesics based on type and severity of pain and evaluate response  - Implement non-pharmacological measures as appropriate and evaluate response  - Consider cultural and social influences on pain and pain management  - Notify physician/advanced practitioner if interventions unsuccessful or patient reports new pain  Outcome: Progressing     Problem: INFECTION - ADULT  Goal: Absence or prevention of progression during hospitalization  Description: INTERVENTIONS:  - Assess and monitor for signs and symptoms of infection  - Monitor lab/diagnostic results  - Monitor all insertion sites, i.e. indwelling lines, tubes, and drains  - Monitor endotracheal if appropriate and nasal secretions for changes in amount and color  - [place] appropriate cooling/warming therapies per order  - Administer medications as ordered  - Instruct and encourage patient and family to use good hand hygiene technique  -  Identify and instruct in appropriate isolation precautions for identified infection/condition  Outcome: Progressing  Goal: Absence of fever/infection during neutropenic period  Description: INTERVENTIONS:  - Monitor WBC    Outcome: Progressing     Problem: SAFETY ADULT  Goal: Patient will remain free of falls  Description: INTERVENTIONS:  - Educate patient/family on patient safety including physical limitations  - Instruct patient to call for assistance with activity   - Consult OT/PT to assist with strengthening/mobility   - Keep Call bell within reach  - Keep bed low and locked with side rails adjusted as appropriate  - Keep care items and personal belongings within reach  - Initiate and maintain comfort rounds  - Make Fall Risk Sign visible to staff  - Offer Toileting every  Hours, in advance of need  - Initiate/Maintain alarm  - Obtain necessary fall risk management equipment:   - Apply yellow socks and bracelet for high fall risk patients  - Consider moving patient to room near nurses station  Outcome: Progressing  Goal: Maintain or return to baseline ADL function  Description: INTERVENTIONS:  -  Assess patient's ability to carry out ADLs; assess patient's baseline for ADL function and identify physical deficits which impact ability to perform ADLs (bathing, care of mouth/teeth, toileting, grooming, dressing, etc.)  - Assess/evaluate cause of self-care deficits   - Assess range of motion  - Assess patient's mobility; develop plan if impaired  - Assess patient's need for assistive devices and provide as appropriate  - Encourage maximum independence but intervene and supervise when necessary  - Involve family in performance of ADLs  - Assess for home care needs following discharge   - Consider OT consult to assist with ADL evaluation and planning for discharge  - Provide patient education as appropriate  Outcome: Progressing  Goal: Maintains/Returns to pre admission functional level  Description:  INTERVENTIONS:  - Perform AM-PAC 6 Click Basic Mobility/ Daily Activity assessment daily.  - Set and communicate daily mobility goal to care team and patient/family/caregiver.   - Collaborate with rehabilitation services on mobility goals if consulted  - Perform Range of Motion  times a day.  - Reposition patient every  hours.  - Dangle patient  times a day  - Stand patient  times a day  - Ambulate patient  times a day  - Out of bed to chair  times a day   - Out of bed for meals  times a day  - Out of bed for toileting  - Record patient progress and toleration of activity level   Outcome: Progressing     Problem: DISCHARGE PLANNING  Goal: Discharge to home or other facility with appropriate resources  Description: INTERVENTIONS:  - Identify barriers to discharge w/patient and caregiver  - Arrange for needed discharge resources and transportation as appropriate  - Identify discharge learning needs (meds, wound care, etc.)  - Arrange for interpretive services to assist at discharge as needed  - Refer to Case Management Department for coordinating discharge planning if the patient needs post-hospital services based on physician/advanced practitioner order or complex needs related to functional status, cognitive ability, or social support system  Outcome: Progressing     Problem: Knowledge Deficit  Goal: Patient/family/caregiver demonstrates understanding of disease process, treatment plan, medications, and discharge instructions  Description: Complete learning assessment and assess knowledge base.  Interventions:  - Provide teaching at level of understanding  - Provide teaching via preferred learning methods  Outcome: Progressing     Problem: Neurological Deficit  Goal: Neurological status is stable or improving  Description: Interventions:  - Monitor and assess patient's level of consciousness, motor function, sensory function, and level of assistance needed for ADLs.   - Monitor and report changes from baseline.  Collaborate with interdisciplinary team to initiate plan and implement interventions as ordered.   - Provide and maintain a safe environment.  - Consider seizure precautions.  - Consider fall precautions.  - Consider aspiration precautions.  - Consider bleeding precautions.  Outcome: Progressing     Problem: Activity Intolerance/Impaired Mobility  Goal: Mobility/activity is maintained at optimum level for patient  Description: Interventions:  - Assess and monitor patient  barriers to mobility and need for assistive/adaptive devices.  - Assess patient's emotional response to limitations.  - Collaborate with interdisciplinary team and initiate plans and interventions as ordered.  - Encourage independent activity per ability.  - Maintain proper body alignment.  - Perform active/passive rom as tolerated/ordered.  - Plan activities to conserve energy.  - Turn patient as appropriate  Outcome: Progressing     Problem: Communication Impairment  Goal: Ability to express needs and understand communication  Description: Assess patient's communication skills and ability to understand information.  Patient will demonstrate use of effective communication techniques, alternative methods of communication and understanding even if not able to speak.     - Encourage communication and provide alternate methods of communication as needed.  - Collaborate with case management/ for discharge needs.  - Include patient/family/caregiver in decisions related to communication.  Outcome: Progressing     Problem: Potential for Aspiration  Goal: Non-ventilated patient's risk of aspiration is minimized  Description: Assess and monitor vital signs, respiratory status, and labs (WBC).  Monitor for signs of aspiration (tachypnea, cough, rales, wheezing, cyanosis, fever).    - Assess and monitor patient's ability to swallow.  - Place patient up in chair to eat if possible.  - HOB up at 90 degrees to eat if unable to get patient up into  chair.  - Supervise patient during oral intake.   - Instruct patient/ family to take small bites.  - Instruct patient/ family to take small single sips when taking liquids.  - Follow patient-specific strategies generated by speech pathologist.  Outcome: Progressing  Goal: Ventilated patient's risk of aspiration is minimized  Description: Assess and monitor vital signs, respiratory status, airway cuff pressure, and labs (WBC).  Monitor for signs of aspiration (tachypnea, cough, rales, wheezing, cyanosis, fever).    - Elevate head of bed 30 degrees if patient has tube feeding.  - Monitor tube feeding.  Outcome: Progressing     Problem: Nutrition  Goal: Nutrition/Hydration status is improving  Description: Monitor and assess patient's nutrition/hydration status for malnutrition (ex- brittle hair, bruises, dry skin, pale skin and conjunctiva, muscle wasting, smooth red tongue, and disorientation). Collaborate with interdisciplinary team and initiate plan and interventions as ordered.  Monitor patient's weight and dietary intake as ordered or per policy. Utilize nutrition screening tool and intervene per policy. Determine patient's food preferences and provide high-protein, high-caloric foods as appropriate.     - Assist patient with eating.  - Allow adequate time for meals.  - Encourage patient to take dietary supplement as ordered.  - Collaborate with clinical nutritionist.  - Include patient/family/caregiver in decisions related to nutrition.  Outcome: Progressing

## 2023-12-26 NOTE — TELEPHONE ENCOUNTER
"Left voicemail informing patient that once I have the results of the 2W ZIO patch, then I can schedule the LOOP Recorder Implant.     Thanks,  Abena \"Caridad\" Diogo     "

## 2023-12-26 NOTE — CASE MANAGEMENT
Case Management Discharge Planning Note    Patient name Elvi Castillo  Location [facility] /E4 -* MRN [id number]  : 1955 Date 2023       Current Admission Date: 2023  Current Admission Diagnosis:Acute ischemic left MCA stroke (HCC)   Patient Active Problem List    Diagnosis Date Noted    Acute ischemic left MCA stroke (HCC) 2023    Parotid tumor 2023    COVID-19 2023    History of brain tumor 2023    Hyponatremia 01/15/2021    Femur fracture, right (HCC) 2021    Seizure disorder (HCC) 2020    Increased frequency of urination 03/10/2015    Acute arthritis 2012    Impaired fasting glucose 2012    Hypertension 2012    Schizoaffective disorder (HCC) 05/10/2011      LOS (days): 6  Geometric Mean LOS (GMLOS) (days): 4.6  Days to GMLOS:-1.3     OBJECTIVE:  Risk of Unplanned Readmission Score: 11.04         Current admission status: Inpatient   Preferred Pharmacy:   [facility] [place] [place] - [address] [place], [place] [address]  Phone: [phone] Fax: [phone]    [facility]/pharmacy #[id number] - [place] - [address] [facility]  [address] [place], [place] [address]  Phone: [phone] Fax: [phone]    Primary Care Provider: Zelalem Barnett DO    Primary Insurance: MEDICARE  Secondary Insurance: [place] [employer] AND [facility]    DISCHARGE DETAILS:    Discharge planning discussed with:: Sister  Freedom of Choice: Yes  Comments - Freedom of Choice: TBD  CM contacted family/caregiver?: Yes  Were Treatment Team discharge recommendations reviewed with patient/caregiver?: Yes  Did patient/caregiver verbalize understanding of patient care needs?: Yes  Were patient/caregiver advised of the risks associated with not following Treatment Team discharge recommendations?: Yes    Contacts  Patient Contacts: Martha Vanessa  Relationship to Patient:: Family  Contact Method: In Person  Phone Number:  [phone]  Reason/Outcome: Discharge Planning    Requested Home Health Care         Is the patient interested in HHC at discharge?: No    DME Referral Provided  Referral made for DME?: No       Would you like to participate in our [facility] service program?  : No - Declined    Treatment Team Recommendation: Home with Home Health Care, Short Term Rehab  Discharge Destination Plan:: Home with Home Health Care, Short Term Rehab          Additional Comments: CM spoke with patient's sister Martha (Sister)  [phone]   who states she is unable to take care of her sister at this time and wants referrals sent to SNF for rehab as she states patient is not at her baseline. She reports that prior to hospital patient was able to at least dress herself, however she states she may need LTC. CM explained the process for LTC and sister states she would want to start off with possible SNF placement first. CM advised that if she doesn't qualify for placement, she will have to prepare for patient to return home with HHC services or LTC arrangements. Sister voiced understanding. Referral sent via [facility] for placement.

## 2023-12-26 NOTE — PROGRESS NOTES
Patient has had CVA.  Recommend ZIO AT patch with real-time data.  Would schedule for outpatient HILLARY and implantable loop recorder postdischarge.  Hypercoagulable workup as per primary/medicine

## 2023-12-26 NOTE — QUICK NOTE
Sister, Martha, called to update regarding plan for Zio AT and outpatient HILLARY with implantable loop recorder. Martha does not wish to pursue outpatient monitoring or HILLARY at this time. In addition, Martha is concerned that the patient would continue to rip off her monitor due to her schizophrenia. Updated internal medicine team.

## 2023-12-26 NOTE — PROGRESS NOTES
Cardiology Progress Note   MD Jose Tamez MD, FACC  Serjio Blankenship DO, [facility]  MD Homa Maher DO, [facility]  Dami Gonzalez DO, [facility]  ----------------------------------------------------------------  [address] [address]    Elvi Castillo 68 y.o. female MRN: [id number]  Unit/Bed#: E4 -01 Encounter: [id number]      ASSESSMENT:   Acute left MCA and left temporoparietal infarct by MRI, December 2023  Acute COVID-19 infection  Hypertension  LVEF 65%, mild LVH, grade 1 diastolic dysfunction, mild RV dilatation, no evidence of PFO by agitated saline injection, mild MR/TR, December 2023  History of brain tumor resected in her 20s  Seizure disorder  Schizoaffective disorder  Parotid tumor    PLAN:  Patient had no evidence of PFO/ASD by agitated saline injection and LV function was normal  Likelihood of intracardiac etiology resulting in CVA is likely less than 1%  Due to the potential risk to patient as well as staff with acute COVID-19 infection, would defer to the outpatient setting  Order has been placed for ZIO AT patch for real-time data and notification sent to electrophysiology to set up placement of implantable loop recorder  Continue aspirin, high intensity statin, Plavix and amlodipine  No further inpatient CV testing  Patient's sister spoken to by cardiology CRNP and sister does not wish to pursue monitor for HILLARY at this time with risks and benefits discussed; recommend discussing further in the office  As family not requesting any cardiac procedures at this time, no further inpatient CV testing  Will see further inpatient as needed; please reconsult with questions    Signed: Serjio Blankenship DO, [facility] FACP      History of Present Illness:  Patient seen and examined.  Denies any chest pain, pressure, tightness or squeezing.  Denies significant shortness of breath, lightheadedness or dizziness.      Review of  Systems:  Review of Systems   Constitutional: Negative for decreased appetite, fever, weight gain and weight loss.   HENT:  Negative for congestion and sore throat.    Eyes:  Negative for visual disturbance.   Cardiovascular:  Negative for chest pain, dyspnea on exertion, leg swelling, near-syncope and palpitations.   Respiratory:  Negative for cough and shortness of breath.    Hematologic/Lymphatic: Negative for bleeding problem.   Skin:  Negative for rash.   Musculoskeletal:  Negative for myalgias and neck pain.   Gastrointestinal:  Negative for abdominal pain and nausea.   Neurological:  Negative for light-headedness and weakness.   Psychiatric/Behavioral:  Negative for depression.        Allergies   Allergen Reactions    Penicillins Hives       No current facility-administered medications on file prior to encounter.     Current Outpatient Medications on File Prior to Encounter   Medication Sig    amLODIPine (NORVASC) 5 mg tablet Take 1 tablet (5 mg total) by mouth daily    carBAMazepine (TEGretol) 200 mg tablet Take 400 mg by mouth daily at bedtime    cholecalciferol (VITAMIN D3) 400 units tablet Take 400 Units by mouth daily    multivitamin (THERAGRAN) TABS Take 1 tablet by mouth daily    OLANZapine (ZyPREXA) 20 MG tablet Take 40 mg by mouth daily at bedtime         Current Facility-Administered Medications   Medication Dose Route Frequency Provider Last Rate    amLODIPine  5 mg Oral Daily BENEDICTO Hickey-MELISSA      aspirin  81 mg Oral Daily BENEDICTO Luu-MELISSA      atorvastatin  40 mg Oral QPM Jigna Salmon PA-C      carBAMazepine  400 mg Oral HS Jigna Salmon, PA-C      clopidogrel  75 mg Oral Daily Truong Slate, PA-C      enoxaparin  40 mg Subcutaneous Daily Jigna Salmon, PA-C      OLANZapine  40 mg Oral HS Jigna Salmon PA-C      ondansetron  4 mg Intravenous Q6H PRN BENEDICTO Luu-MELISSA              Vitals:    12/25/23 1909 12/25/23 2333 12/26/23 0324 12/26/23 0828   BP: 130/71 128/75 151/71 119/76   BP  "Location: Left arm Right arm Left arm Right arm   Pulse: 83 78 71 79   Resp: (!) 10 18 18 18   Temp: 99.1 °F (37.3 °C) (!) 97.1 °F (36.2 °C) 98.6 °F (37 °C) 98.4 °F (36.9 °C)   TempSrc: Temporal Oral Oral Temporal   SpO2: 98% 98% 99% 99%   Weight:       Height:         Body mass index is 20.9 kg/m².      Intake/Output Summary (Last 24 hours) at 12/26/2023 0931  Last data filed at 12/25/2023 1126  Gross per 24 hour   Intake --   Output 500 ml   Net -500 ml       Weight change:     PHYSICAL EXAMINATION:  Gen: Awake, Alert, NAD  Head/eyes: AT/NC, pupils equal and round, Anicteric  ENT: mmm  Neck: Supple, No elevated JVP, trachea midline  Resp: CTA bilaterally no w/r/r  CV: RRR +S1, S2, No m/r/g  Abd: Soft, NT/ND + BS  Ext: no LE edema bilaterally  Neuro: Follows commands, moves all extermities  Psych: Blunted affect, normal mood, pleasant attitude, non-combative  Skin: warm; no rash, erythema or venous stasis changes on exposed skin    Lab Results:  Results from last 7 days   Lab Units 12/26/23  0443   WBC Thousand/uL 6.46   HEMOGLOBIN g/dL 11.6   HEMATOCRIT % 34.4*   PLATELETS Thousands/uL 229     Results from last 7 days   Lab Units 12/26/23  0443 12/22/23  0726 12/20/23  0949   POTASSIUM mmol/L 4.1   < > 3.3*   CHLORIDE mmol/L 104   < > 103   CO2 mmol/L 28   < > 31   BUN mg/dL 21   < > 19   CREATININE mg/dL 0.73   < > 0.73   CALCIUM mg/dL 8.4   < > 8.7   ALK PHOS U/L  --   --  85   ALT U/L  --   --  19   AST U/L  --   --  20    < > = values in this interval not displayed.     No results found for: \"TROPONINT\"      Results from last 7 days   Lab Units 12/20/23  1839 12/20/23  1404 12/20/23  1203 12/20/23  0949   CK TOTAL U/L 27  --   --   --    HS TNI 0HR ng/L  --   --   --  7   HS TNI 2HR ng/L  --   --  8  --    HS TNI 4HR ng/L  --  8  --   --            Tele: SR    This note was completed in part utilizing M-Modal Fluency Direct Software.  Grammatical errors, random word insertions, spelling mistakes, and " incomplete sentences may be an occasional consequence of this system secondary to software limitations, ambient noise, and hardware issues.  If you have any questions or concerns about the content, text, or information contained within the body of this dictation, please contact the provider for clarification.

## 2023-12-27 PROCEDURE — 97530 THERAPEUTIC ACTIVITIES: CPT

## 2023-12-27 PROCEDURE — 99233 SBSQ HOSP IP/OBS HIGH 50: CPT | Performed by: PHYSICIAN ASSISTANT

## 2023-12-27 PROCEDURE — 97535 SELF CARE MNGMENT TRAINING: CPT

## 2023-12-27 RX ADMIN — OLANZAPINE 40 MG: 10 TABLET, FILM COATED ORAL at 22:50

## 2023-12-27 RX ADMIN — CARBAMAZEPINE 400 MG: 200 TABLET ORAL at 22:50

## 2023-12-27 RX ADMIN — AMLODIPINE BESYLATE 5 MG: 5 TABLET ORAL at 08:27

## 2023-12-27 RX ADMIN — CLOPIDOGREL BISULFATE 75 MG: 75 TABLET ORAL at 08:23

## 2023-12-27 RX ADMIN — ASPIRIN 81 MG CHEWABLE TABLET 81 MG: 81 TABLET CHEWABLE at 08:23

## 2023-12-27 RX ADMIN — ENOXAPARIN SODIUM 40 MG: 40 INJECTION SUBCUTANEOUS at 08:23

## 2023-12-27 RX ADMIN — ATORVASTATIN CALCIUM 40 MG: 40 TABLET, FILM COATED ORAL at 17:09

## 2023-12-27 NOTE — PLAN OF CARE
Problem: Potential for Falls  Goal: Patient will remain free of falls  Description: INTERVENTIONS:  - Educate patient/family on patient safety including physical limitations  - Instruct patient to call for assistance with activity   - Consult OT/PT to assist with strengthening/mobility   - Keep Call bell within reach  - Keep bed low and locked with side rails adjusted as appropriate  - Keep care items and personal belongings within reach  - Initiate and maintain comfort rounds  - Make Fall Risk Sign visible to staff  - Offer Toileting every  Hours, in advance of need  - Initiate/Maintain alarm  - Obtain necessary fall risk management equipment:   - Apply yellow socks and bracelet for high fall risk patients  - Consider moving patient to room near nurses station  Outcome: Progressing     Problem: PAIN - ADULT  Goal: Verbalizes/displays adequate comfort level or baseline comfort level  Description: Interventions:  - Encourage patient to monitor pain and request assistance  - Assess pain using appropriate pain scale  - Administer analgesics based on type and severity of pain and evaluate response  - Implement non-pharmacological measures as appropriate and evaluate response  - Consider cultural and social influences on pain and pain management  - Notify physician/advanced practitioner if interventions unsuccessful or patient reports new pain  Outcome: Progressing     Problem: INFECTION - ADULT  Goal: Absence or prevention of progression during hospitalization  Description: INTERVENTIONS:  - Assess and monitor for signs and symptoms of infection  - Monitor lab/diagnostic results  - Monitor all insertion sites, i.e. indwelling lines, tubes, and drains  - Monitor endotracheal if appropriate and nasal secretions for changes in amount and color  - [place] appropriate cooling/warming therapies per order  - Administer medications as ordered  - Instruct and encourage patient and family to use good hand hygiene technique  -  Identify and instruct in appropriate isolation precautions for identified infection/condition  Outcome: Progressing  Goal: Absence of fever/infection during neutropenic period  Description: INTERVENTIONS:  - Monitor WBC    Outcome: Progressing     Problem: SAFETY ADULT  Goal: Patient will remain free of falls  Description: INTERVENTIONS:  - Educate patient/family on patient safety including physical limitations  - Instruct patient to call for assistance with activity   - Consult OT/PT to assist with strengthening/mobility   - Keep Call bell within reach  - Keep bed low and locked with side rails adjusted as appropriate  - Keep care items and personal belongings within reach  - Initiate and maintain comfort rounds  - Make Fall Risk Sign visible to staff  - Offer Toileting every  Hours, in advance of need  - Initiate/Maintain alarm  - Obtain necessary fall risk management equipment:   - Apply yellow socks and bracelet for high fall risk patients  - Consider moving patient to room near nurses station  Outcome: Progressing  Goal: Maintain or return to baseline ADL function  Description: INTERVENTIONS:  -  Assess patient's ability to carry out ADLs; assess patient's baseline for ADL function and identify physical deficits which impact ability to perform ADLs (bathing, care of mouth/teeth, toileting, grooming, dressing, etc.)  - Assess/evaluate cause of self-care deficits   - Assess range of motion  - Assess patient's mobility; develop plan if impaired  - Assess patient's need for assistive devices and provide as appropriate  - Encourage maximum independence but intervene and supervise when necessary  - Involve family in performance of ADLs  - Assess for home care needs following discharge   - Consider OT consult to assist with ADL evaluation and planning for discharge  - Provide patient education as appropriate  Outcome: Progressing  Goal: Maintains/Returns to pre admission functional level  Description:  INTERVENTIONS:  - Perform AM-PAC 6 Click Basic Mobility/ Daily Activity assessment daily.  - Set and communicate daily mobility goal to care team and patient/family/caregiver.   - Collaborate with rehabilitation services on mobility goals if consulted  - Perform Range of Motion  times a day.  - Reposition patient every  hours.  - Dangle patient  times a day  - Stand patient  times a day  - Ambulate patient  times a day  - Out of bed to chair  times a day   - Out of bed for meals times a day  - Out of bed for toileting  - Record patient progress and toleration of activity level   Outcome: Progressing     Problem: DISCHARGE PLANNING  Goal: Discharge to home or other facility with appropriate resources  Description: INTERVENTIONS:  - Identify barriers to discharge w/patient and caregiver  - Arrange for needed discharge resources and transportation as appropriate  - Identify discharge learning needs (meds, wound care, etc.)  - Arrange for interpretive services to assist at discharge as needed  - Refer to Case Management Department for coordinating discharge planning if the patient needs post-hospital services based on physician/advanced practitioner order or complex needs related to functional status, cognitive ability, or social support system  Outcome: Progressing     Problem: Knowledge Deficit  Goal: Patient/family/caregiver demonstrates understanding of disease process, treatment plan, medications, and discharge instructions  Description: Complete learning assessment and assess knowledge base.  Interventions:  - Provide teaching at level of understanding  - Provide teaching via preferred learning methods  Outcome: Progressing     Problem: Neurological Deficit  Goal: Neurological status is stable or improving  Description: Interventions:  - Monitor and assess patient's level of consciousness, motor function, sensory function, and level of assistance needed for ADLs.   - Monitor and report changes from baseline.  Collaborate with interdisciplinary team to initiate plan and implement interventions as ordered.   - Provide and maintain a safe environment.  - Consider seizure precautions.  - Consider fall precautions.  - Consider aspiration precautions.  - Consider bleeding precautions.  Outcome: Progressing     Problem: Activity Intolerance/Impaired Mobility  Goal: Mobility/activity is maintained at optimum level for patient  Description: Interventions:  - Assess and monitor patient  barriers to mobility and need for assistive/adaptive devices.  - Assess patient's emotional response to limitations.  - Collaborate with interdisciplinary team and initiate plans and interventions as ordered.  - Encourage independent activity per ability.  - Maintain proper body alignment.  - Perform active/passive rom as tolerated/ordered.  - Plan activities to conserve energy.  - Turn patient as appropriate  Outcome: Progressing     Problem: Communication Impairment  Goal: Ability to express needs and understand communication  Description: Assess patient's communication skills and ability to understand information.  Patient will demonstrate use of effective communication techniques, alternative methods of communication and understanding even if not able to speak.     - Encourage communication and provide alternate methods of communication as needed.  - Collaborate with case management/ for discharge needs.  - Include patient/family/caregiver in decisions related to communication.  Outcome: Progressing     Problem: Potential for Aspiration  Goal: Non-ventilated patient's risk of aspiration is minimized  Description: Assess and monitor vital signs, respiratory status, and labs (WBC).  Monitor for signs of aspiration (tachypnea, cough, rales, wheezing, cyanosis, fever).    - Assess and monitor patient's ability to swallow.  - Place patient up in chair to eat if possible.  - HOB up at 90 degrees to eat if unable to get patient up into  chair.  - Supervise patient during oral intake.   - Instruct patient/ family to take small bites.  - Instruct patient/ family to take small single sips when taking liquids.  - Follow patient-specific strategies generated by speech pathologist.  Outcome: Progressing  Goal: Ventilated patient's risk of aspiration is minimized  Description: Assess and monitor vital signs, respiratory status, airway cuff pressure, and labs (WBC).  Monitor for signs of aspiration (tachypnea, cough, rales, wheezing, cyanosis, fever).    - Elevate head of bed 30 degrees if patient has tube feeding.  - Monitor tube feeding.  Outcome: Progressing     Problem: Nutrition  Goal: Nutrition/Hydration status is improving  Description: Monitor and assess patient's nutrition/hydration status for malnutrition (ex- brittle hair, bruises, dry skin, pale skin and conjunctiva, muscle wasting, smooth red tongue, and disorientation). Collaborate with interdisciplinary team and initiate plan and interventions as ordered.  Monitor patient's weight and dietary intake as ordered or per policy. Utilize nutrition screening tool and intervene per policy. Determine patient's food preferences and provide high-protein, high-caloric foods as appropriate.     - Assist patient with eating.  - Allow adequate time for meals.  - Encourage patient to take dietary supplement as ordered.  - Collaborate with clinical nutritionist.  - Include patient/family/caregiver in decisions related to nutrition.  Outcome: Progressing

## 2023-12-27 NOTE — ASSESSMENT & PLAN NOTE
Presented to the ED with 3 days of confusion and slurred speech  CTA head with age-indeterminate hypodensity in left middle cerebral artery territory in temporoparietal junction with focal hyperdense vessel sign indicative of vascular occlusion.  Additionally mild local sulcal effacement favoring a more recent/acute/subacute event  Admitted on stroke pathway and received aspirin and Plavix bolus  Started on aspirin 81 mg daily, plavix 75 mg daily and atorvastatin 40 mg  Continue DAPT  Stat CT head for changes in neuro exam  MRI brain with moderate-sized acute left MCA infarct  Echo obtained, EF 65%, no PFO noted, valves not well visualized  Cardiology consulted for possible HILLARY however unable to be performed due to patient's COVID-positive status  Cardiology called patient's sister on the phone, Martha, regarding possible Zio patch.  She does not wish to pursue outpatient monitoring or HILLARY.  Follow up with neurology as an outpatient  PT/OT recommending home with home health  Discussed with case management, patient's sister reporting that she is unable to care for her any longer and is requesting nursing home placement  Speech evaluated- level 3 dysphagia

## 2023-12-27 NOTE — PLAN OF CARE
Problem: OCCUPATIONAL THERAPY ADULT  Goal: Performs self-care activities at highest level of function for planned discharge setting.  See evaluation for individualized goals.  Description: Treatment Interventions: ADL retraining, Functional transfer training, UE strengthening/ROM, Endurance training, Patient/family training, Equipment evaluation/education, Compensatory technique education, Continued evaluation, Activityengagement          See flowsheet documentation for full assessment, interventions and recommendations.   Outcome: Progressing  Note: Limitation: Decreased ADL status, Decreased UE strength, Decreased Safe judgement during ADL, Decreased cognition, Decreased endurance, Decreased self-care trans, Decreased high-level ADLs  Prognosis: Fair  Assessment: Pt seen for skilled OT tx this date. Tx focused on improving strength, activity tolerance and balance, safety awareness to increase independence with self care tasks. Pt tolerated session fair. Pt was limited by weakness and self limiting behaviors. Requires max encouragement for active participation. Greeted in supine and agreeable to skilled OT session. Pt performed UB dressing/ bathing with supervision , LB dressing / bathing Peggy , Toileting supervision,  bed mobility porsche, transfers supervision, mobility with Rw supervision. Pt demonstrated fair-  standing balance during functional tasks, no LOB noted. Pt required moderate to max verbal cuing during session to safely complete tasks. Current OT DC recommendations for pt is level 3 resources.     Rehab Resource Intensity Level, OT: III (Minimum Resource Intensity)

## 2023-12-27 NOTE — PLAN OF CARE
Problem: PHYSICAL THERAPY ADULT  Goal: Performs mobility at highest level of function for planned discharge setting.  See evaluation for individualized goals.  Description: Treatment/Interventions: Functional transfer training, LE strengthening/ROM, Elevations, Therapeutic exercise, Patient/family training, Gait training, Spoke to nursing, OT  Equipment Recommended: Other (Comment) (None at this time)       See flowsheet documentation for full assessment, interventions and recommendations.  Outcome: Progressing  Note: Prognosis: Good  Problem List: Decreased cognition, Impaired judgement, Decreased safety awareness  Assessment: Elvi was seen for a f/u session and to update POC as appropriate. Demonstrates progress towards goals including decreased assist for ambulation, increased walking distance. Ambulating limited household distances without difficulty. No LOB noted w use of RW. Continues to present w deficits of cognition (?baseline). As a result pt requires encouragement to participate; limited assessment of mobility dt decreased effort. Also requires S for OOB activity given impulsive behaviors. Would benefit from continued mobilization via nsg/restorative. If sister unable to cont to care for patient may need to consider transition to more supervised environment/ LTC.  Barriers to Discharge: None, Decreased caregiver support  Barriers to Discharge Comments: per chart review sister unable to cont to care for pt  Rehab Resource Intensity Level, PT: No post-acute rehabilitation needs (may benefit from transition to LTC given sister unable to care for patient at current LOF)    See flowsheet documentation for full assessment.

## 2023-12-27 NOTE — PROGRESS NOTES
[facility]  Progress Note  Name: Elvi Castillo I  MRN: [id number]  Unit/Bed#: E4 -01 I Date of Admission: 12/20/2023   Date of Service: 12/27/2023 I Hospital Day: 7    Assessment/Plan   History of brain tumor  Assessment & Plan  Per sister, pt has a history of a brain tumor that was resected in the patient's 20s  Currently patient lives with her sister who provides most care and assistance  At baseline patient is able to perform simple tasks like dress herself but does have memory loss  Worsened confusion and speech difficulties in the setting of acute CVA    COVID-19  Assessment & Plan  Presented to the hospital with slurred speech and confusion x 3 days  Found to be COVID-positive  Asymptomatic and not hypoxic  CXR without infiltrate or consolidation  Therefore hold off on remdesivir      Parotid tumor  Assessment & Plan  CT head performed revealing 4.6 cm left parotid mass which was previously imaged and is stable from August  Has seen ENT as an outpatient had biopsy performed 9/18/2023-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  For removal on 11/8/2023 however there was an issue with her preoperative EGD and surgery was not performed  Was scheduled to undergo additional cardiac workup prior to rescheduling removal of tumor   Echo with EF 65%  Ongoing outpt followup with ENT    Seizure disorder (HCC)  Assessment & Plan  Maintained on Tegretol for 100 mg twice daily    Schizoaffective disorder (HCC)  Assessment & Plan  Continue home regimen of Zyprexa 40 mg at bedtime    Hypertension  Assessment & Plan  Presenting with hypertension urgency in the setting of acute stroke  Home regimen amlodipine 5 mg daily  Amlodipine initially on hold in the setting of CVA pathway to allow for permissive hypertension  Restarted amlodipine    * Acute ischemic left MCA stroke (HCC)  Assessment & Plan  Presented to the ED with 3 days of confusion and slurred speech  CTA head with age-indeterminate hypodensity  in left middle cerebral artery territory in temporoparietal junction with focal hyperdense vessel sign indicative of vascular occlusion.  Additionally mild local sulcal effacement favoring a more recent/acute/subacute event  Admitted on stroke pathway and received aspirin and Plavix bolus  Started on aspirin 81 mg daily, plavix 75 mg daily and atorvastatin 40 mg  Continue DAPT  Stat CT head for changes in neuro exam  MRI brain with moderate-sized acute left MCA infarct  Echo obtained, EF 65%, no PFO noted, valves not well visualized  Cardiology consulted for possible HILLARY however unable to be performed due to patient's COVID-positive status  Cardiology called patient's sister on the phone, Martha, regarding possible Zio patch.  She does not wish to pursue outpatient monitoring or HILLARY.  Follow up with neurology as an outpatient  PT/OT recommending home with home health  Discussed with case management, patient's sister reporting that she is unable to care for her any longer and is requesting nursing home placement  Speech evaluated- level 3 dysphagia       VTE Pharmacologic Prophylaxis:   High Risk (Score >/= 5) - Pharmacological DVT Prophylaxis Ordered: enoxaparin (Lovenox). Sequential Compression Devices Ordered.    Mobility:   Basic Mobility Inpatient Raw Score: 20  JH-HLM Goal: 6: Walk 10 steps or more  JH-HLM Achieved: 7: Walk 25 feet or more  HLM Goal NOT achieved. Continue with multidisciplinary rounding and encourage appropriate mobility to improve upon HLM goals.    Patient Centered Rounds: I performed bedside rounds with nursing staff today.   Discussions with Specialists or Other Care Team Provider: case management    Education and Discussions with Family / Patient: Updated  (sister) via phone.    Total Time Spent on Date of Encounter in care of patient: 45 mins. This time was spent on one or more of the following: performing physical exam; counseling and coordination of care; obtaining or  reviewing history; documenting in the medical record; reviewing/ordering tests, medications or procedures; communicating with other healthcare professionals and discussing with patient's family/caregivers.    Current Length of Stay: 7 day(s)  Current Patient Status: Inpatient   Certification Statement: The patient will continue to require additional inpatient hospital stay due to pending placement  Discharge Plan: Anticipate discharge in 24-48 hrs to rehab facility.    Code Status: Level 3 - DNAR and DNI    Subjective:   Pt is poor historian but overall offers no complaints. Did tell me she slept well last night.     Objective:     Vitals:   Temp (24hrs), Av.7 °F (36.5 °C), Min:97.6 °F (36.4 °C), Max:97.8 °F (36.6 °C)    Temp:  [97.6 °F (36.4 °C)-97.8 °F (36.6 °C)] 97.6 °F (36.4 °C)  HR:  [75-90] 86  Resp:  [17-18] 18  BP: (127-159)/(74-81) 131/77  SpO2:  [98 %-100 %] 100 %  Body mass index is 20.9 kg/m².     Input and Output Summary (last 24 hours):   No intake or output data in the 24 hours ending 23 1427    Physical Exam:   Physical Exam  Vitals and nursing note reviewed.   Constitutional:       General: She is not in acute distress.     Appearance: Normal appearance. She is normal weight. She is not ill-appearing or toxic-appearing.   HENT:      Head: Normocephalic and atraumatic.      Right Ear: External ear normal.      Left Ear: External ear normal.      Nose: Nose normal.      Mouth/Throat:      Mouth: Mucous membranes are moist.   Cardiovascular:      Rate and Rhythm: Normal rate and regular rhythm.      Pulses: Normal pulses.      Heart sounds: Normal heart sounds. No murmur heard.     No gallop.   Pulmonary:      Effort: Pulmonary effort is normal. No respiratory distress.      Breath sounds: Normal breath sounds. No stridor. No wheezing, rhonchi or rales.   Abdominal:      General: Abdomen is flat. Bowel sounds are normal. There is no distension.      Palpations: Abdomen is soft. There is no  mass.      Tenderness: There is no abdominal tenderness. There is no guarding or rebound.      Hernia: No hernia is present.   Musculoskeletal:      Cervical back: Normal range of motion.      Right lower leg: No edema.      Left lower leg: No edema.   Skin:     General: Skin is warm and dry.   Neurological:      General: No focal deficit present.      Mental Status: She is alert.      Sensory: No sensory deficit.      Motor: No weakness.      Comments: Answers inappropriately/nonsensically to 2 of my questions   Psychiatric:         Mood and Affect: Mood normal.          Additional Data:     Labs:  Results from last 7 days   Lab Units 12/26/23  0443   WBC Thousand/uL 6.46   HEMOGLOBIN g/dL 11.6   HEMATOCRIT % 34.4*   PLATELETS Thousands/uL 229     Results from last 7 days   Lab Units 12/26/23  0443   SODIUM mmol/L 136   POTASSIUM mmol/L 4.1   CHLORIDE mmol/L 104   CO2 mmol/L 28   BUN mg/dL 21   CREATININE mg/dL 0.73   ANION GAP mmol/L 4   CALCIUM mg/dL 8.4   GLUCOSE RANDOM mg/dL 86             Results from last 7 days   Lab Units 12/21/23  0542   HEMOGLOBIN A1C % 5.3           Lines/Drains:  Invasive Devices       Peripheral Intravenous Line  Duration             Peripheral IV 12/27/23 Left;Proximal;Ventral (anterior) Forearm <1 day                      Telemetry:  Telemetry Orders (From admission, onward)               24 Hour Telemetry Monitoring  Continuous x 24 Hours (Telem)        Expiring   Question:  Reason for 24 Hour Telemetry  Answer:  TIA/Suspected CVA/ Confirmed CVA                     Telemetry Reviewed: Normal Sinus Rhythm  Indication for Continued Telemetry Use: No indication for continued use. Will discontinue.              Imaging: Reviewed radiology reports from this admission including: MRI brain    Recent Cultures (last 7 days):         Last 24 Hours Medication List:   Current Facility-Administered Medications   Medication Dose Route Frequency Provider Last Rate    amLODIPine  5 mg Oral Daily  Truong Lombardo PA-C      aspirin  81 mg Oral Daily Jigna Salmon, VITA      atorvastatin  40 mg Oral QPM Jigna Salmon, VITA      carBAMazepine  400 mg Oral HS Jigna Salmon, VITA      clopidogrel  75 mg Oral Daily Truong Lombardo PA-C      enoxaparin  40 mg Subcutaneous Daily Jigna Salmon, VITA      OLANZapine  40 mg Oral HS Jigna Salmon, VITA      ondansetron  4 mg Intravenous Q6H PRN Jigna Salmon PA-C          Today, Patient Was Seen By: Amy Cox PA-C    **Please Note: This note may have been constructed using a voice recognition system.**

## 2023-12-27 NOTE — CASE MANAGEMENT
Case Management Discharge Planning Note    Patient name Elvi Castillo  Location [facility] /E4 -* MRN [id number]  : 1955 Date 2023       Current Admission Date: 2023  Current Admission Diagnosis:Acute ischemic left MCA stroke (HCC)   Patient Active Problem List    Diagnosis Date Noted    Acute ischemic left MCA stroke (HCC) 2023    Parotid tumor 2023    COVID-19 2023    History of brain tumor 2023    Hyponatremia 01/15/2021    Femur fracture, right (HCC) 2021    Seizure disorder (HCC) 2020    Increased frequency of urination 03/10/2015    Acute arthritis 2012    Impaired fasting glucose 2012    Hypertension 2012    Schizoaffective disorder (HCC) 05/10/2011      LOS (days): 7  Geometric Mean LOS (GMLOS) (days): 4.6  Days to GMLOS:-2.3     OBJECTIVE:  Risk of Unplanned Readmission Score: 11.21         Current admission status: Inpatient   Preferred Pharmacy:   [facility] [place] - [address] [place] [address]  Phone: [phone] Fax: [phone]    [facility]/pharmacy #[id number] - [place] - [address] [facility]  [address] [place] [address]  Phone: [phone] Fax: [phone]    Primary Care Provider: Zelalem Barnett DO    Primary Insurance: MEDICARE  Secondary Insurance: [place] [employer] AND [facility]    DISCHARGE DETAILS:     Additional Comments: Per [place], the only accepting facility at this time is [facility]. CM called pt's sister, Martha, to inform however she did not answer and mail box was full so CM was unable to leave a . CM department to follow.

## 2023-12-27 NOTE — PLAN OF CARE
Problem: Potential for Falls  Goal: Patient will remain free of falls  Description: INTERVENTIONS:  - Educate patient/family on patient safety including physical limitations  - Instruct patient to call for assistance with activity   - Consult OT/PT to assist with strengthening/mobility   - Keep Call bell within reach  - Keep bed low and locked with side rails adjusted as appropriate  - Keep care items and personal belongings within reach  - Initiate and maintain comfort rounds  - Make Fall Risk Sign visible to staff  - Offer Toileting every 2 Hours, in advance of need  - Initiate/Maintain bed alarm  - Obtain necessary fall risk management equipment: bed alarm   - Apply yellow socks and bracelet for high fall risk patients  - Consider moving patient to room near nurses station  Outcome: Progressing     Problem: PAIN - ADULT  Goal: Verbalizes/displays adequate comfort level or baseline comfort level  Description: Interventions:  - Encourage patient to monitor pain and request assistance  - Assess pain using appropriate pain scale  - Administer analgesics based on type and severity of pain and evaluate response  - Implement non-pharmacological measures as appropriate and evaluate response  - Consider cultural and social influences on pain and pain management  - Notify physician/advanced practitioner if interventions unsuccessful or patient reports new pain  Outcome: Progressing     Problem: INFECTION - ADULT  Goal: Absence or prevention of progression during hospitalization  Description: INTERVENTIONS:  - Assess and monitor for signs and symptoms of infection  - Monitor lab/diagnostic results  - Monitor all insertion sites, i.e. indwelling lines, tubes, and drains  - Monitor endotracheal if appropriate and nasal secretions for changes in amount and color  - [place] appropriate cooling/warming therapies per order  - Administer medications as ordered  - Instruct and encourage patient and family to use good hand  hygiene technique  - Identify and instruct in appropriate isolation precautions for identified infection/condition  Outcome: Progressing  Goal: Absence of fever/infection during neutropenic period  Description: INTERVENTIONS:  - Monitor WBC    Outcome: Progressing     Problem: SAFETY ADULT  Goal: Patient will remain free of falls  Description: INTERVENTIONS:  - Educate patient/family on patient safety including physical limitations  - Instruct patient to call for assistance with activity   - Consult OT/PT to assist with strengthening/mobility   - Keep Call bell within reach  - Keep bed low and locked with side rails adjusted as appropriate  - Keep care items and personal belongings within reach  - Initiate and maintain comfort rounds  - Make Fall Risk Sign visible to staff  - Offer Toileting every 2 Hours, in advance of need  - Initiate/Maintain bed alarm  - Obtain necessary fall risk management equipment: bed alarm   - Apply yellow socks and bracelet for high fall risk patients  - Consider moving patient to room near nurses station  Outcome: Progressing  Goal: Maintain or return to baseline ADL function  Description: INTERVENTIONS:  -  Assess patient's ability to carry out ADLs; assess patient's baseline for ADL function and identify physical deficits which impact ability to perform ADLs (bathing, care of mouth/teeth, toileting, grooming, dressing, etc.)  - Assess/evaluate cause of self-care deficits   - Assess range of motion  - Assess patient's mobility; develop plan if impaired  - Assess patient's need for assistive devices and provide as appropriate  - Encourage maximum independence but intervene and supervise when necessary  - Involve family in performance of ADLs  - Assess for home care needs following discharge   - Consider OT consult to assist with ADL evaluation and planning for discharge  - Provide patient education as appropriate  Outcome: Progressing  Goal: Maintains/Returns to pre admission functional  level  Description: INTERVENTIONS:  - Perform AM-PAC 6 Click Basic Mobility/ Daily Activity assessment daily.  - Set and communicate daily mobility goal to care team and patient/family/caregiver.   - Collaborate with rehabilitation services on mobility goals if consulted  - Perform Range of Motion 4 times a day.  - Reposition patient every 2 hours.  - Dangle patient 4 times a day  - Stand patient 4 times a day  - Ambulate patient 4 times a day  - Out of bed to chair 4 times a day   - Out of bed for meals    times a day  - Out of bed for toileting  - Record patient progress and toleration of activity level   Outcome: Progressing     Problem: DISCHARGE PLANNING  Goal: Discharge to home or other facility with appropriate resources  Description: INTERVENTIONS:  - Identify barriers to discharge w/patient and caregiver  - Arrange for needed discharge resources and transportation as appropriate  - Identify discharge learning needs (meds, wound care, etc.)  - Arrange for interpretive services to assist at discharge as needed  - Refer to Case Management Department for coordinating discharge planning if the patient needs post-hospital services based on physician/advanced practitioner order or complex needs related to functional status, cognitive ability, or social support system  Outcome: Progressing     Problem: Knowledge Deficit  Goal: Patient/family/caregiver demonstrates understanding of disease process, treatment plan, medications, and discharge instructions  Description: Complete learning assessment and assess knowledge base.  Interventions:  - Provide teaching at level of understanding  - Provide teaching via preferred learning methods  Outcome: Progressing     Problem: Neurological Deficit  Goal: Neurological status is stable or improving  Description: Interventions:  - Monitor and assess patient's level of consciousness, motor function, sensory function, and level of assistance needed for ADLs.   - Monitor and report  changes from baseline. Collaborate with interdisciplinary team to initiate plan and implement interventions as ordered.   - Provide and maintain a safe environment.  - Consider seizure precautions.  - Consider fall precautions.  - Consider aspiration precautions.  - Consider bleeding precautions.  Outcome: Progressing     Problem: Activity Intolerance/Impaired Mobility  Goal: Mobility/activity is maintained at optimum level for patient  Description: Interventions:  - Assess and monitor patient  barriers to mobility and need for assistive/adaptive devices.  - Assess patient's emotional response to limitations.  - Collaborate with interdisciplinary team and initiate plans and interventions as ordered.  - Encourage independent activity per ability.  - Maintain proper body alignment.  - Perform active/passive rom as tolerated/ordered.  - Plan activities to conserve energy.  - Turn patient as appropriate  Outcome: Progressing     Problem: Communication Impairment  Goal: Ability to express needs and understand communication  Description: Assess patient's communication skills and ability to understand information.  Patient will demonstrate use of effective communication techniques, alternative methods of communication and understanding even if not able to speak.     - Encourage communication and provide alternate methods of communication as needed.  - Collaborate with case management/ for discharge needs.  - Include patient/family/caregiver in decisions related to communication.  Outcome: Progressing     Problem: Potential for Aspiration  Goal: Non-ventilated patient's risk of aspiration is minimized  Description: Assess and monitor vital signs, respiratory status, and labs (WBC).  Monitor for signs of aspiration (tachypnea, cough, rales, wheezing, cyanosis, fever).    - Assess and monitor patient's ability to swallow.  - Place patient up in chair to eat if possible.  - HOB up at 90 degrees to eat if unable  to get patient up into chair.  - Supervise patient during oral intake.   - Instruct patient/ family to take small bites.  - Instruct patient/ family to take small single sips when taking liquids.  - Follow patient-specific strategies generated by speech pathologist.  Outcome: Progressing  Goal: Ventilated patient's risk of aspiration is minimized  Description: Assess and monitor vital signs, respiratory status, airway cuff pressure, and labs (WBC).  Monitor for signs of aspiration (tachypnea, cough, rales, wheezing, cyanosis, fever).    - Elevate head of bed 30 degrees if patient has tube feeding.  - Monitor tube feeding.  Outcome: Progressing     Problem: Nutrition  Goal: Nutrition/Hydration status is improving  Description: Monitor and assess patient's nutrition/hydration status for malnutrition (ex- brittle hair, bruises, dry skin, pale skin and conjunctiva, muscle wasting, smooth red tongue, and disorientation). Collaborate with interdisciplinary team and initiate plan and interventions as ordered.  Monitor patient's weight and dietary intake as ordered or per policy. Utilize nutrition screening tool and intervene per policy. Determine patient's food preferences and provide high-protein, high-caloric foods as appropriate.     - Assist patient with eating.  - Allow adequate time for meals.  - Encourage patient to take dietary supplement as ordered.  - Collaborate with clinical nutritionist.  - Include patient/family/caregiver in decisions related to nutrition.  Outcome: Progressing

## 2023-12-27 NOTE — ASSESSMENT & PLAN NOTE
Per sister, pt has a history of a brain tumor that was resected in the patient's 20s  Currently patient lives with her sister who provides most care and assistance  At baseline patient is able to perform simple tasks like dress herself but does have memory loss  Worsened confusion and speech difficulties in the setting of acute CVA

## 2023-12-27 NOTE — OCCUPATIONAL THERAPY NOTE
Occupational Therapy Progress Note     Patient Name: Elvi Castillo  Today's Date: 12/27/2023  Problem List  Principal Problem:    Acute ischemic left MCA stroke (HCC)  Active Problems:    Hypertension    Schizoaffective disorder (HCC)    Seizure disorder (HCC)    Parotid tumor    COVID-19    History of brain tumor            12/27/23 1040   OT Last Visit   OT Visit Date 12/27/23   Note Type   Note Type Treatment   Pain Assessment   Pain Assessment Tool 0-10   Pain Score No Pain   Restrictions/Precautions   Weight Bearing Precautions Per Order No   Other Precautions Contact/isolation;Airborne/isolation;Cognitive;Chair Alarm;Bed Alarm;Fall Risk;Multiple lines   ADL   Where Assessed Edge of bed   Grooming Assistance 4  Minimal Assistance   Grooming Deficit Setup;Verbal cueing;Supervision/safety;Increased time to complete;Brushing hair   UB Dressing Assistance 5  Supervision/Setup   UB Dressing Deficit Setup;Verbal cueing;Supervision/safety;Increased time to complete   LB Dressing Assistance 5  Supervision/Setup   LB Dressing Deficit Setup;Don/doff R sock;Don/doff L sock   LB Dressing Comments supervision for R sosk but due to self limiting behaviors refused to don left sock without assist.   Toileting Assistance  5  Supervision/Setup   Toileting Deficit Setup;Verbal cueing;Supervison/safety;Increased time to complete   Functional Standing Tolerance   Time 1-3 min   Activity toileting, self care tasks.   Comments RW for support.   Bed Mobility   Supine to Sit 6  Modified independent   Sit to Supine 6  Modified independent   Transfers   Sit to Stand 5  Supervision   Additional items Assist x 1;Increased time required;Verbal cues   Stand to Sit 5  Supervision   Additional items Assist x 1;Increased time required;Verbal cues   Toilet transfer 5  Supervision   Additional items Assist x 1;Increased time required;Standard toilet;Verbal cues   Additional Comments cues for safety, pacing and best tech.   Functional  Mobility   Functional Mobility 5  Supervision   Additional Comments RW, functional distances in room.   Additional items Rolling walker   Toilet Transfers   Toilet Transfer From Rolling walker   Toilet Transfer Type To and from   Toilet Transfer to Standard toilet   Toilet Transfer Technique Ambulating   Toilet Transfers Supervision   Cognition   Overall Cognitive Status Impaired   Arousal/Participation Cooperative   Attention Attends with cues to redirect   Orientation Level Oriented to person;Oriented to place   Memory Decreased short term memory;Decreased recall of recent events;Decreased recall of precautions   Following Commands Follows one step commands with increased time or repetition   Activity Tolerance   Activity Tolerance Patient tolerated treatment well;Patient limited by fatigue   Medical Staff Made Aware PT Kendra   Assessment   Assessment Pt seen for skilled OT tx this date. Tx focused on improving strength, activity tolerance and balance, safety awareness to increase independence with self care tasks. Pt tolerated session fair. Pt was limited by weakness and self limiting behaviors. Requires max encouragement for active participation. Greeted in supine and agreeable to skilled OT session. Pt performed UB dressing/ bathing with supervision , LB dressing / bathing Peggy , Toileting supervision,  bed mobility porsche, transfers supervision, mobility with Rw supervision. Pt demonstrated fair-  standing balance during functional tasks, no LOB noted. Pt required moderate to max verbal cuing during session to safely complete tasks. Current OT DC recommendations for pt is level 3 resources.   Plan   Treatment Interventions ADL retraining;Functional transfer training;UE strengthening/ROM;Endurance training;Patient/family training;Equipment evaluation/education;Compensatory technique education;Continued evaluation;Activityengagement   Goal Expiration Date 01/04/24   OT Treatment Day 1   OT Frequency 2-3x/wk    Discharge Recommendation   Rehab Resource Intensity Level, OT III (Minimum Resource Intensity)   Additional Comments  The patient's raw score on the AM-PAC Daily Activity Inpatient Short Form is 19. A raw score of greater than or equal to 19 suggests the patient may benefit from discharge to home. Please refer to the recommendation of the Occupational Therapist for safe discharge planning.   AM-PAC Daily Activity Inpatient   Lower Body Dressing 3   Bathing 3   Toileting 3   Upper Body Dressing 3   Grooming 3   Eating 4   Daily Activity Raw Score 19   Daily Activity Standardized Score (Calc for Raw Score >=11) 40.22   AM-PAC Applied Cognition Inpatient   Following a Speech/Presentation 3   Understanding Ordinary Conversation 3   Taking Medications 2   Remembering Where Things Are Placed or Put Away 2   Remembering List of 4-5 Errands 2   Taking Care of Complicated Tasks 2   Applied Cognition Raw Score 14   Applied Cognition Standardized Score 32.02   Linda Chapa, OT

## 2023-12-27 NOTE — CASE MANAGEMENT
Case Management Discharge Planning Note    Patient name Elvi Castillo  Location [facility] /E[facility] -* MRN [id number]  : 1955 Date 2023       Current Admission Date: 2023  Current Admission Diagnosis:Acute ischemic left MCA stroke (HCC)   Patient Active Problem List    Diagnosis Date Noted    Acute ischemic left MCA stroke (HCC) 2023    Parotid tumor 2023    COVID-19 2023    History of brain tumor 2023    Hyponatremia 01/15/2021    Femur fracture, right (HCC) 2021    Seizure disorder (HCC) 2020    Increased frequency of urination 03/10/2015    Acute arthritis 2012    Impaired fasting glucose 2012    Hypertension 2012    Schizoaffective disorder (HCC) 05/10/2011      LOS (days): 7  Geometric Mean LOS (GMLOS) (days): 4.6  Days to GMLOS:-2.3     OBJECTIVE:  Risk of Unplanned Readmission Score: 11.21         Current admission status: Inpatient   Preferred Pharmacy:   [facility] [place] - [address] [place] [address]  Phone: [phone] Fax: [phone]    [id number] [address] [facility]  [address] [place], [place] [id number]  Phone: [phone] Fax: [phone]    Primary Care Provider: Zelalem Barnett DO    Primary Insurance: MEDICARE  Secondary Insurance: [place] [employer] AND [facility]    DISCHARGE DETAILS:    Discharge planning discussed with:: Sister  Freedom of Choice: Yes  Comments - Freedom of Choice: agreeable to Em BUCK contacted family/caregiver?: Yes             Contacts  Patient Contacts: Martha  Relationship to Patient:: Family  Reason/Outcome: Emergency Contact, Discharge Planning    Requested Home Health Care         Is the patient interested in HHC at discharge?: No    DME Referral Provided  Referral made for DME?: No    Other Referral/Resources/Interventions Provided:  Interventions: SNF  Referral Comments: Em  reserved         Treatment Team Recommendation: SNF  Discharge Destination Plan:: SNF                                IMM Given (Date):: 12/27/23  IMM Given to:: Family     Additional Comments: CM spoke with sister Martha to review choice list. At this time, [facility] is only accepting facility. CM informed Martha of same. Martha is agreeable to pt's d/c to [facility]. CM reserved in [place]. IMM was reviewed with Martha. Martha reports understanding of the ability to appeal discharge if feeling as though not medically stable for discharge. IMM was signed and placed in the scan bin.

## 2023-12-27 NOTE — ASSESSMENT & PLAN NOTE
Presenting with hypertension urgency in the setting of acute stroke  Home regimen amlodipine 5 mg daily  Amlodipine initially on hold in the setting of CVA pathway to allow for permissive hypertension  Restarted amlodipine

## 2023-12-27 NOTE — PHYSICAL THERAPY NOTE
"                                                                                  PHYSICAL THERAPY NOTE          Patient Name: Elvi Castillo  Today's Date: 12/27/2023  Time: 3116-6651       12/27/23 1105   PT Last Visit   PT Visit Date 12/27/23   Note Type   Note Type Treatment   Pain Assessment   Pain Assessment Tool 0-10   Pain Score No Pain   Restrictions/Precautions   Other Precautions Contact/isolation;Airborne/isolation;Cognitive;Chair Alarm;Bed Alarm;Fall Risk   General   Chart Reviewed Yes   Response to Previous Treatment Patient unable to report, no changes reported from family or staff   Cognition   Overall Cognitive Status Impaired   Arousal/Participation Cooperative   Attention Attends with cues to redirect   Orientation Level Oriented to person;Oriented to place   Memory Decreased recall of precautions;Decreased short term memory   Following Commands Follows one step commands with increased time or repetition   Comments unclear baseline mentation. hx schizoaffective disorder and memory loss. encouragement to participate   Subjective   Subjective \"bed\"   Bed Mobility   Supine to Sit 6  Modified independent   Additional items Bedrails;Increased time required   Sit to Supine 6  Modified independent   Additional items Increased time required;Impulsive   Transfers   Sit to Stand 5  Supervision   Additional items Other  (AD)   Stand to Sit 5  Supervision   Additional items Other  (AD)   Toilet transfer 5  Supervision   Additional items Increased time required;Standard toilet;Other  (AD)   Ambulation/Elevation   Gait pattern Decreased heel strike;Short stride  (decreased heel strike on L/ toe walking)   Gait Assistance 5  Supervision   Additional items Assist x 1   Assistive Device Rolling walker   Distance 7'x3, 25', 15'   Balance   Static Standing Fair   Dynamic Standing Fair -   Ambulatory Fair -   Endurance Deficit   Endurance Deficit No   Activity Tolerance   Activity Tolerance Patient tolerated " treatment well;Other (Comment)  (effort)   Medical Staff Made Aware Julissa OT   Nurse Made Aware yes   Assessment   Prognosis Good   Problem List Decreased cognition;Impaired judgement;Decreased safety awareness   Assessment Elvi was seen for a f/u session and to update POC as appropriate. Demonstrates progress towards goals including decreased assist for ambulation, increased walking distance. Ambulating limited household distances without difficulty. No LOB noted w use of RW. Continues to present w deficits of cognition (?baseline). As a result pt requires encouragement to participate; limited assessment of mobility dt decreased effort. Also requires S for OOB activity given impulsive behaviors. Would benefit from continued mobilization via nsg/restorative. If sister unable to cont to care for patient may need to consider transition to more supervised environment/ LTC.   Barriers to Discharge None;Decreased caregiver support   Barriers to Discharge Comments per chart review sister unable to cont to care for pt   Goals   Patient Goals get to bed   STG Expiration Date (S)  01/06/24   Short Term Goal #2 1). Pt will perform bed mobility indep demonstrating appropriate technique 100% of the time in order to improve function.2) Amb with least restrictive AD > 150'x1 with S in order to demonstrate ability to negotiate in home environment.4) Improve overall strength and balance 1/2 grade in order to optimize ability to perform functional tasks and reduce fall risk.5) Increase activity tolerance to 45 minutes in order to improve endurance to functional tasks.6) Negotiate stairs using most appropriate technique and S in order to be able to negotiate safely in home environment. 7) PT for ongoing patient and family/caregiver education, DME needs and d/c planning in order to promote highest level of function in least restrictive environment.   PT Treatment Day 1   Plan   Treatment/Interventions Functional transfer  training;LE strengthening/ROM;Elevations;Therapeutic exercise;Patient/family training;Cognitive reorientation;Equipment eval/education;Bed mobility;Gait training;Spoke to nursing;OT   Progress Progressing toward goals   PT Frequency 1-2x/wk   Discharge Recommendation   Rehab Resource Intensity Level, PT No post-acute rehabilitation needs  (may benefit from transition to LTC given sister unable to care for patient at current LOF)   Equipment Recommended Walker  (if pt does not own)   Walker Package Recommended Wheeled walker   AM-PAC Basic Mobility Inpatient   Turning in Flat Bed Without Bedrails 4   Lying on Back to Sitting on Edge of Flat Bed Without Bedrails 4   Moving Bed to Chair 3   Standing Up From Chair Using Arms 3   Walk in Room 3   Climb 3-5 Stairs With Railing 3   Basic Mobility Inpatient Raw Score 20   Basic Mobility Standardized Score 43.99   Highest Level Of Mobility   JH-HLM Goal 6: Walk 10 steps or more   JH-HLM Achieved 7: Walk 25 feet or more   End of Consult   Patient Position at End of Consult Supine;Bed/Chair alarm activated;All needs within reach     Kendra Lr, PT

## 2023-12-27 NOTE — CASE MANAGEMENT
Case Management Discharge Planning Note    Patient name Elvi Castillo  Location [facility] /E4 -* MRN [id number]  : 1955 Date 2023       Current Admission Date: 2023  Current Admission Diagnosis:Acute ischemic left MCA stroke (HCC)   Patient Active Problem List    Diagnosis Date Noted    Acute ischemic left MCA stroke (HCC) 2023    Parotid tumor 2023    COVID-19 2023    History of brain tumor 2023    Hyponatremia 01/15/2021    Femur fracture, right (HCC) 2021    Seizure disorder (HCC) 2020    Increased frequency of urination 03/10/2015    Acute arthritis 2012    Impaired fasting glucose 2012    Hypertension 2012    Schizoaffective disorder (HCC) 05/10/2011      LOS (days): 7  Geometric Mean LOS (GMLOS) (days): 4.6  Days to GMLOS:-2.1     OBJECTIVE:  Risk of Unplanned Readmission Score: 11.18         Current admission status: Inpatient   Preferred Pharmacy:   [facility] [place] - [address] [place] [address]  Phone: [phone] Fax: [phone]    [facility]/pharmacy #[id number] - [place] - [address] [facility]  [address] [place] [address]  Phone: [phone] Fax: [phone]    Primary Care Provider: Zelalem Barnett DO    Primary Insurance: MEDICARE  Secondary Insurance: [place] [employer] AND [facility]    DISCHARGE DETAILS:    Additional Comments: CM sent messages to [facility] facilities that are still reviewing to request an update as pt is medically stable for discharge. Pt being Covid+ could be a barrier to placement. CM department to follow.

## 2023-12-28 PROCEDURE — 92526 ORAL FUNCTION THERAPY: CPT

## 2023-12-28 PROCEDURE — 99232 SBSQ HOSP IP/OBS MODERATE 35: CPT | Performed by: HOSPITALIST

## 2023-12-28 RX ADMIN — OLANZAPINE 40 MG: 10 TABLET, FILM COATED ORAL at 21:10

## 2023-12-28 RX ADMIN — ENOXAPARIN SODIUM 40 MG: 40 INJECTION SUBCUTANEOUS at 09:32

## 2023-12-28 RX ADMIN — CLOPIDOGREL BISULFATE 75 MG: 75 TABLET ORAL at 09:21

## 2023-12-28 RX ADMIN — AMLODIPINE BESYLATE 5 MG: 5 TABLET ORAL at 09:21

## 2023-12-28 RX ADMIN — ATORVASTATIN CALCIUM 40 MG: 40 TABLET, FILM COATED ORAL at 17:08

## 2023-12-28 RX ADMIN — ASPIRIN 81 MG CHEWABLE TABLET 81 MG: 81 TABLET CHEWABLE at 09:21

## 2023-12-28 RX ADMIN — CARBAMAZEPINE 400 MG: 200 TABLET ORAL at 21:10

## 2023-12-28 NOTE — ASSESSMENT & PLAN NOTE
Presented to the ED with 3 days of confusion and slurred speech  CTA head with age-indeterminate hypodensity in left middle cerebral artery territory in temporoparietal junction with focal hyperdense vessel sign indicative of vascular occlusion.  Additionally mild local sulcal effacement favoring a more recent/acute/subacute event  Admitted on stroke pathway and received aspirin and Plavix bolus  Started on aspirin 81 mg daily, plavix 75 mg daily and atorvastatin 40 mg  Continue DAPT  Stat CT head for changes in neuro exam  MRI brain with moderate-sized acute left MCA infarct  Echo obtained, EF 65%, no PFO noted, valves not well visualized  Cardiology consulted for possible HILLARY however unable to be performed due to patient's COVID-positive status  Cardiology called patient's sister on the phone, Martha, regarding possible Zio patch.  She does not wish to pursue outpatient monitoring or HILLARY.  Follow up with neurology as an outpatient  PT/OT recommending home with home health  Discussed with case management, patient's sister reporting that she is unable to care for her any longer and is requesting nursing home placement  Speech evaluated- level 3 dysphagia    Patient is doing well  Waiting on placement.

## 2023-12-28 NOTE — TELEPHONE ENCOUNTER
"Left voicemail informing patient to call me to schedule LOOP Recorder Implant for a future date to give us enough time to have results of 2W ZIO patch.      Patient is currently hospitalized.      Thanks,  Abena \"Caridad\" Diogo  "

## 2023-12-28 NOTE — ASSESSMENT & PLAN NOTE
Presented to the hospital with slurred speech and confusion x 3 days  Found to be COVID-positive  Asymptomatic and not hypoxic  No indication for covid specific treatment.

## 2023-12-28 NOTE — SPEECH THERAPY NOTE
Speech Language/Pathology    Speech/Language Pathology Progress Note    Patient Name: Elvi Castillo  Today's Date: 12/28/2023     Problem List  Principal Problem:    Acute ischemic left MCA stroke (HCC)  Active Problems:    Hypertension    Schizoaffective disorder (HCC)    Seizure disorder (HCC)    Parotid tumor    COVID-19    History of brain tumor       Past Medical History  Past Medical History:   Diagnosis Date    History of drug overdose 2005    Hypertension     Memory loss     Schizophrenia (HCC)         Past Surgical History  Past Surgical History:   Procedure Laterality Date    BRAIN SURGERY      Brain Tumor surgery - age late 20's done @ [facility]    CARDIAC CATHETERIZATION  2015    HIP ARTHROPLASTY Right 01/15/2021    Procedure: HemiARTHROPLASTY HIP bipolar;  Surgeon: Mahesh Finch MD;  Location: [facility];  Service: Orthopedics    TX HEMIARTHROPLASTY HIP PARTIAL Right 01/15/2021    Procedure: HEMIARTHROPLASTY HIP (BIPOLAR);  Surgeon: Mahesh Finch MD;  Location: [facility];  Service: Orthopedics    US GUIDED LYMPH NODE BIOPSY LEFT  09/18/2023         Subjective:  Pt alert and pleasant.   Objective:  Seen for f/u on current diet. (Pt was upgraded to a regular diet on 12/26). Seen briefly in am. Reportedly ate 100%, including french toast. Returned at lunch. Pt again completed 100% of her meal, which included broccoli again, as well as roast beef, chopped peaches.  Pt stated she cuts it prior (or staff cuts it) and denied any difficulty chewing. Also taking liquids WNL. No cough or wet vocal quality during session.   Assessment:  Tolerating regular diet.   Plan/Recommendations:  Pleas cut (if pt is unable to cut) foods as needed. Softer selections from regular menu, if necessary. Intake has been good per staff. Will d/c.  Please notify if any concerns.

## 2023-12-28 NOTE — PLAN OF CARE
Problem: Potential for Falls  Goal: Patient will remain free of falls  Description: INTERVENTIONS:  - Educate patient/family on patient safety including physical limitations  - Instruct patient to call for assistance with activity   - Consult OT/PT to assist with strengthening/mobility   - Keep Call bell within reach  - Keep bed low and locked with side rails adjusted as appropriate  - Keep care items and personal belongings within reach  - Initiate and maintain comfort rounds  - Make Fall Risk Sign visible to staff  - Offer Toileting every 3 Hours, in advance of need  - Initiate/Maintain bed alarm  - Obtain necessary fall risk management equipment:   - Apply yellow socks and bracelet for high fall risk patients  - Consider moving patient to room near nurses station  Outcome: Progressing     Problem: PAIN - ADULT  Goal: Verbalizes/displays adequate comfort level or baseline comfort level  Description: Interventions:  - Encourage patient to monitor pain and request assistance  - Assess pain using appropriate pain scale  - Administer analgesics based on type and severity of pain and evaluate response  - Implement non-pharmacological measures as appropriate and evaluate response  - Consider cultural and social influences on pain and pain management  - Notify physician/advanced practitioner if interventions unsuccessful or patient reports new pain  Outcome: Progressing     Problem: INFECTION - ADULT  Goal: Absence or prevention of progression during hospitalization  Description: INTERVENTIONS:  - Assess and monitor for signs and symptoms of infection  - Monitor lab/diagnostic results  - Monitor all insertion sites, i.e. indwelling lines, tubes, and drains  - Monitor endotracheal if appropriate and nasal secretions for changes in amount and color  - [place] appropriate cooling/warming therapies per order  - Administer medications as ordered  - Instruct and encourage patient and family to use good hand hygiene  technique  - Identify and instruct in appropriate isolation precautions for identified infection/condition  Outcome: Progressing  Goal: Absence of fever/infection during neutropenic period  Description: INTERVENTIONS:  - Monitor WBC    Outcome: Progressing     Problem: SAFETY ADULT  Goal: Patient will remain free of falls  Description: INTERVENTIONS:  - Educate patient/family on patient safety including physical limitations  - Instruct patient to call for assistance with activity   - Consult OT/PT to assist with strengthening/mobility   - Keep Call bell within reach  - Keep bed low and locked with side rails adjusted as appropriate  - Keep care items and personal belongings within reach  - Initiate and maintain comfort rounds  - Make Fall Risk Sign visible to staff  - Offer Toileting every 3 Hours, in advance of need  - Initiate/Maintain bed alarm  - Obtain necessary fall risk management equipment:   - Apply yellow socks and bracelet for high fall risk patients  - Consider moving patient to room near nurses station  Outcome: Progressing  Goal: Maintain or return to baseline ADL function  Description: INTERVENTIONS:  -  Assess patient's ability to carry out ADLs; assess patient's baseline for ADL function and identify physical deficits which impact ability to perform ADLs (bathing, care of mouth/teeth, toileting, grooming, dressing, etc.)  - Assess/evaluate cause of self-care deficits   - Assess range of motion  - Assess patient's mobility; develop plan if impaired  - Assess patient's need for assistive devices and provide as appropriate  - Encourage maximum independence but intervene and supervise when necessary  - Involve family in performance of ADLs  - Assess for home care needs following discharge   - Consider OT consult to assist with ADL evaluation and planning for discharge  - Provide patient education as appropriate  Outcome: Progressing  Goal: Maintains/Returns to pre admission functional  level  Description: INTERVENTIONS:  - Perform AM-PAC 6 Click Basic Mobility/ Daily Activity assessment daily.  - Set and communicate daily mobility goal to care team and patient/family/caregiver.   - Collaborate with rehabilitation services on mobility goals if consulted  - Perform Range of Motion 3 times a day.  - Reposition patient every 2 hours.  - Dangle patient 3 times a day  - Stand patient 3 times a day  - Ambulate patient 3 times a day  - Out of bed to chair 3 times a day   - Out of bed for meals 3 times a day  - Out of bed for toileting  - Record patient progress and toleration of activity level   Outcome: Progressing     Problem: DISCHARGE PLANNING  Goal: Discharge to home or other facility with appropriate resources  Description: INTERVENTIONS:  - Identify barriers to discharge w/patient and caregiver  - Arrange for needed discharge resources and transportation as appropriate  - Identify discharge learning needs (meds, wound care, etc.)  - Arrange for interpretive services to assist at discharge as needed  - Refer to Case Management Department for coordinating discharge planning if the patient needs post-hospital services based on physician/advanced practitioner order or complex needs related to functional status, cognitive ability, or social support system  Outcome: Progressing     Problem: Knowledge Deficit  Goal: Patient/family/caregiver demonstrates understanding of disease process, treatment plan, medications, and discharge instructions  Description: Complete learning assessment and assess knowledge base.  Interventions:  - Provide teaching at level of understanding  - Provide teaching via preferred learning methods  Outcome: Progressing     Problem: Neurological Deficit  Goal: Neurological status is stable or improving  Description: Interventions:  - Monitor and assess patient's level of consciousness, motor function, sensory function, and level of assistance needed for ADLs.   - Monitor and report  changes from baseline. Collaborate with interdisciplinary team to initiate plan and implement interventions as ordered.   - Provide and maintain a safe environment.  - Consider seizure precautions.  - Consider fall precautions.  - Consider aspiration precautions.  - Consider bleeding precautions.  Outcome: Progressing     Problem: Activity Intolerance/Impaired Mobility  Goal: Mobility/activity is maintained at optimum level for patient  Description: Interventions:  - Assess and monitor patient  barriers to mobility and need for assistive/adaptive devices.  - Assess patient's emotional response to limitations.  - Collaborate with interdisciplinary team and initiate plans and interventions as ordered.  - Encourage independent activity per ability.  - Maintain proper body alignment.  - Perform active/passive rom as tolerated/ordered.  - Plan activities to conserve energy.  - Turn patient as appropriate  Outcome: Progressing     Problem: Communication Impairment  Goal: Ability to express needs and understand communication  Description: Assess patient's communication skills and ability to understand information.  Patient will demonstrate use of effective communication techniques, alternative methods of communication and understanding even if not able to speak.     - Encourage communication and provide alternate methods of communication as needed.  - Collaborate with case management/ for discharge needs.  - Include patient/family/caregiver in decisions related to communication.  Outcome: Progressing     Problem: Potential for Aspiration  Goal: Non-ventilated patient's risk of aspiration is minimized  Description: Assess and monitor vital signs, respiratory status, and labs (WBC).  Monitor for signs of aspiration (tachypnea, cough, rales, wheezing, cyanosis, fever).    - Assess and monitor patient's ability to swallow.  - Place patient up in chair to eat if possible.  - HOB up at 90 degrees to eat if unable  to get patient up into chair.  - Supervise patient during oral intake.   - Instruct patient/ family to take small bites.  - Instruct patient/ family to take small single sips when taking liquids.  - Follow patient-specific strategies generated by speech pathologist.  Outcome: Progressing  Goal: Ventilated patient's risk of aspiration is minimized  Description: Assess and monitor vital signs, respiratory status, airway cuff pressure, and labs (WBC).  Monitor for signs of aspiration (tachypnea, cough, rales, wheezing, cyanosis, fever).    - Elevate head of bed 30 degrees if patient has tube feeding.  - Monitor tube feeding.  Outcome: Progressing     Problem: Nutrition  Goal: Nutrition/Hydration status is improving  Description: Monitor and assess patient's nutrition/hydration status for malnutrition (ex- brittle hair, bruises, dry skin, pale skin and conjunctiva, muscle wasting, smooth red tongue, and disorientation). Collaborate with interdisciplinary team and initiate plan and interventions as ordered.  Monitor patient's weight and dietary intake as ordered or per policy. Utilize nutrition screening tool and intervene per policy. Determine patient's food preferences and provide high-protein, high-caloric foods as appropriate.     - Assist patient with eating.  - Allow adequate time for meals.  - Encourage patient to take dietary supplement as ordered.  - Collaborate with clinical nutritionist.  - Include patient/family/caregiver in decisions related to nutrition.  Outcome: Progressing     Problem: Nutrition/Hydration-ADULT  Goal: Nutrient/Hydration intake appropriate for improving, restoring or maintaining nutritional needs  Description: Monitor and assess patient's nutrition/hydration status for malnutrition. Collaborate with interdisciplinary team and initiate plan and interventions as ordered.  Monitor patient's weight and dietary intake as ordered or per policy. Utilize nutrition screening tool and intervene as  necessary. Determine patient's food preferences and provide high-protein, high-caloric foods as appropriate.     INTERVENTIONS:  - Monitor oral intake, urinary output, labs, and treatment plans  - Assess nutrition and hydration status and recommend course of action  - Evaluate amount of meals eaten  - Assist patient with eating if necessary   - Allow adequate time for meals  - Recommend/ encourage appropriate diets, oral nutritional supplements, and vitamin/mineral supplements  - Order, calculate, and assess calorie counts as needed  - Recommend, monitor, and adjust tube feedings and TPN/PPN based on assessed needs  - Assess need for intravenous fluids  - Provide specific nutrition/hydration education as appropriate  - Include patient/family/caregiver in decisions related to nutrition  Outcome: Progressing

## 2023-12-28 NOTE — PROGRESS NOTES
Critical access hospital  Progress Note  Name: Elvi Castillo I  MRN: [id number]  Unit/Bed#: E4 -01 I Date of Admission: 2023   Date of Service: 2023 I Hospital Day: 8    Assessment/Plan   COVID-19  Assessment & Plan  Presented to the hospital with slurred speech and confusion x 3 days  Found to be COVID-positive  Asymptomatic and not hypoxic  No indication for covid specific treatment.        * Acute ischemic left MCA stroke (HCC)  Assessment & Plan  Presented to the ED with 3 days of confusion and slurred speech  CTA head with age-indeterminate hypodensity in left middle cerebral artery territory in temporoparietal junction with focal hyperdense vessel sign indicative of vascular occlusion.  Additionally mild local sulcal effacement favoring a more recent/acute/subacute event  Admitted on stroke pathway and received aspirin and Plavix bolus  Started on aspirin 81 mg daily, plavix 75 mg daily and atorvastatin 40 mg  Continue DAPT  Stat CT head for changes in neuro exam  MRI brain with moderate-sized acute left MCA infarct  Echo obtained, EF 65%, no PFO noted, valves not well visualized  Cardiology consulted for possible HILLARY however unable to be performed due to patient's COVID-positive status  Cardiology called patient's sister on the phone, Martha, regarding possible Zio patch.  She does not wish to pursue outpatient monitoring or HILLARY.  Follow up with neurology as an outpatient  PT/OT recommending home with home health  Discussed with case management, patient's sister reporting that she is unable to care for her any longer and is requesting nursing home placement  Speech evaluated- level 3 dysphagia    Patient is doing well  Waiting on placement.               Subjective:   Feels well  No complaints  No cough  No sob        Objective:     Vitals:   Temp (24hrs), Av.4 °F (36.3 °C), Min:96.6 °F (35.9 °C), Max:98.2 °F (36.8 °C)    Temp:  [96.6 °F (35.9 °C)-98.2 °F (36.8 °C)]  97.8 °F (36.6 °C)  HR:  [] 82  Resp:  [16-18] 18  BP: (116-140)/(71-90) 122/76  SpO2:  [97 %-99 %] 97 %  Body mass index is 20.9 kg/m².     Input and Output Summary (last 24 hours):     No intake or output data in the 24 hours ending 12/28/23 8405    Physical Exam:     Physical Exam  Vitals and nursing note reviewed.   HENT:      Head: Normocephalic and atraumatic.   Eyes:      Pupils: Pupils are equal, round, and reactive to light.   Cardiovascular:      Rate and Rhythm: Normal rate and regular rhythm.      Heart sounds: No murmur heard.     No friction rub. No gallop.   Pulmonary:      Effort: Pulmonary effort is normal.      Breath sounds: Normal breath sounds. No wheezing or rales.   Abdominal:      General: Bowel sounds are normal.      Palpations: Abdomen is soft.      Tenderness: There is no abdominal tenderness.   Musculoskeletal:      Right lower leg: No edema.      Left lower leg: No edema.       .       Additional Data:     Labs:    Results from last 7 days   Lab Units 12/26/23  0443   WBC Thousand/uL 6.46   HEMOGLOBIN g/dL 11.6   HEMATOCRIT % 34.4*   PLATELETS Thousands/uL 229     Results from last 7 days   Lab Units 12/26/23  0443   POTASSIUM mmol/L 4.1   CHLORIDE mmol/L 104   CO2 mmol/L 28   BUN mg/dL 21   CREATININE mg/dL 0.73   CALCIUM mg/dL 8.4                       * I Have Reviewed All Lab Data     Recent Cultures (last 7 days):             Last 24 Hours Medication List:   Current Facility-Administered Medications   Medication Dose Route Frequency Provider Last Rate    amLODIPine  5 mg Oral Daily Truong Lombardo PA-C      aspirin  81 mg Oral Daily Jigna Salmon PA-C      atorvastatin  40 mg Oral QPM BENEDICTO Luu-MELISSA      carBAMazepine  400 mg Oral HS Jigna Salmon PA-C      clopidogrel  75 mg Oral Daily Truong Lombardo PA-C      enoxaparin  40 mg Subcutaneous Daily BENEDICTO Luu-MELISSA      OLANZapine  40 mg Oral HS BENEDICTO Luu-MELISSA      ondansetron  4 mg Intravenous Q6H PRN Jigna Salmon,  VITA           VTE Pharmacologic Prophylaxis:   Pharmacologic: Enoxaparin (Lovenox)      Current Length of Stay: 8 day(s)    Current Patient Status: Inpatient       Discharge Plan: waiting on nursing home placement.    Code Status: Level 3 - DNAR and DNI           Today, Patient Was Seen By: Adams Geller DO    ** Please Note: Dictation voice to text software may have been used in the creation of this document. **

## 2023-12-28 NOTE — PLAN OF CARE
Problem: Potential for Falls  Goal: Patient will remain free of falls  Description: INTERVENTIONS:  - Educate patient/family on patient safety including physical limitations  - Instruct patient to call for assistance with activity   - Consult OT/PT to assist with strengthening/mobility   - Keep Call bell within reach  - Keep bed low and locked with side rails adjusted as appropriate  - Keep care items and personal belongings within reach  - Initiate and maintain comfort rounds  - Make Fall Risk Sign visible to staff  - Offer Toileting every 2 Hours, in advance of need  - Initiate/Maintain bed alarm  - Obtain necessary fall risk management equipment: bed alarm   - Apply yellow socks and bracelet for high fall risk patients  - Consider moving patient to room near nurses station  Outcome: Progressing     Problem: PAIN - ADULT  Goal: Verbalizes/displays adequate comfort level or baseline comfort level  Description: Interventions:  - Encourage patient to monitor pain and request assistance  - Assess pain using appropriate pain scale  - Administer analgesics based on type and severity of pain and evaluate response  - Implement non-pharmacological measures as appropriate and evaluate response  - Consider cultural and social influences on pain and pain management  - Notify physician/advanced practitioner if interventions unsuccessful or patient reports new pain  Outcome: Progressing     Problem: INFECTION - ADULT  Goal: Absence or prevention of progression during hospitalization  Description: INTERVENTIONS:  - Assess and monitor for signs and symptoms of infection  - Monitor lab/diagnostic results  - Monitor all insertion sites, i.e. indwelling lines, tubes, and drains  - Monitor endotracheal if appropriate and nasal secretions for changes in amount and color  - [place] appropriate cooling/warming therapies per order  - Administer medications as ordered  - Instruct and encourage patient and family to use good hand  hygiene technique  - Identify and instruct in appropriate isolation precautions for identified infection/condition  Outcome: Progressing  Goal: Absence of fever/infection during neutropenic period  Description: INTERVENTIONS:  - Monitor WBC    Outcome: Progressing     Problem: SAFETY ADULT  Goal: Patient will remain free of falls  Description: INTERVENTIONS:  - Educate patient/family on patient safety including physical limitations  - Instruct patient to call for assistance with activity   - Consult OT/PT to assist with strengthening/mobility   - Keep Call bell within reach  - Keep bed low and locked with side rails adjusted as appropriate  - Keep care items and personal belongings within reach  - Initiate and maintain comfort rounds  - Make Fall Risk Sign visible to staff  - Offer Toileting every 2 Hours, in advance of need  - Initiate/Maintain bed alarm  - Obtain necessary fall risk management equipment: bed alarm   - Apply yellow socks and bracelet for high fall risk patients  - Consider moving patient to room near nurses station  Outcome: Progressing  Goal: Maintain or return to baseline ADL function  Description: INTERVENTIONS:  -  Assess patient's ability to carry out ADLs; assess patient's baseline for ADL function and identify physical deficits which impact ability to perform ADLs (bathing, care of mouth/teeth, toileting, grooming, dressing, etc.)  - Assess/evaluate cause of self-care deficits   - Assess range of motion  - Assess patient's mobility; develop plan if impaired  - Assess patient's need for assistive devices and provide as appropriate  - Encourage maximum independence but intervene and supervise when necessary  - Involve family in performance of ADLs  - Assess for home care needs following discharge   - Consider OT consult to assist with ADL evaluation and planning for discharge  - Provide patient education as appropriate  Outcome: Progressing  Goal: Maintains/Returns to pre admission functional  level  Description: INTERVENTIONS:  - Perform AM-PAC 6 Click Basic Mobility/ Daily Activity assessment daily.  - Set and communicate daily mobility goal to care team and patient/family/caregiver.   - Collaborate with rehabilitation services on mobility goals if consulted  - Perform Range of Motion 4 times a day.  - Reposition patient every 2 hours.  - Dangle patient 4 times a day  - Stand patient 4 times a day  - Ambulate patient 4 times a day  - Out of bed to chair 4 times a day   - Out of bed for meals 4 times a day  - Out of bed for toileting  - Record patient progress and toleration of activity level   Outcome: Progressing     Problem: DISCHARGE PLANNING  Goal: Discharge to home or other facility with appropriate resources  Description: INTERVENTIONS:  - Identify barriers to discharge w/patient and caregiver  - Arrange for needed discharge resources and transportation as appropriate  - Identify discharge learning needs (meds, wound care, etc.)  - Arrange for interpretive services to assist at discharge as needed  - Refer to Case Management Department for coordinating discharge planning if the patient needs post-hospital services based on physician/advanced practitioner order or complex needs related to functional status, cognitive ability, or social support system  Outcome: Progressing     Problem: Knowledge Deficit  Goal: Patient/family/caregiver demonstrates understanding of disease process, treatment plan, medications, and discharge instructions  Description: Complete learning assessment and assess knowledge base.  Interventions:  - Provide teaching at level of understanding  - Provide teaching via preferred learning methods  Outcome: Progressing     Problem: Neurological Deficit  Goal: Neurological status is stable or improving  Description: Interventions:  - Monitor and assess patient's level of consciousness, motor function, sensory function, and level of assistance needed for ADLs.   - Monitor and report  changes from baseline. Collaborate with interdisciplinary team to initiate plan and implement interventions as ordered.   - Provide and maintain a safe environment.  - Consider seizure precautions.  - Consider fall precautions.  - Consider aspiration precautions.  - Consider bleeding precautions.  Outcome: Progressing     Problem: Activity Intolerance/Impaired Mobility  Goal: Mobility/activity is maintained at optimum level for patient  Description: Interventions:  - Assess and monitor patient  barriers to mobility and need for assistive/adaptive devices.  - Assess patient's emotional response to limitations.  - Collaborate with interdisciplinary team and initiate plans and interventions as ordered.  - Encourage independent activity per ability.  - Maintain proper body alignment.  - Perform active/passive rom as tolerated/ordered.  - Plan activities to conserve energy.  - Turn patient as appropriate  Outcome: Progressing     Problem: Communication Impairment  Goal: Ability to express needs and understand communication  Description: Assess patient's communication skills and ability to understand information.  Patient will demonstrate use of effective communication techniques, alternative methods of communication and understanding even if not able to speak.     - Encourage communication and provide alternate methods of communication as needed.  - Collaborate with case management/ for discharge needs.  - Include patient/family/caregiver in decisions related to communication.  Outcome: Progressing     Problem: Potential for Aspiration  Goal: Non-ventilated patient's risk of aspiration is minimized  Description: Assess and monitor vital signs, respiratory status, and labs (WBC).  Monitor for signs of aspiration (tachypnea, cough, rales, wheezing, cyanosis, fever).    - Assess and monitor patient's ability to swallow.  - Place patient up in chair to eat if possible.  - HOB up at 90 degrees to eat if unable  to get patient up into chair.  - Supervise patient during oral intake.   - Instruct patient/ family to take small bites.  - Instruct patient/ family to take small single sips when taking liquids.  - Follow patient-specific strategies generated by speech pathologist.  Outcome: Progressing  Goal: Ventilated patient's risk of aspiration is minimized  Description: Assess and monitor vital signs, respiratory status, airway cuff pressure, and labs (WBC).  Monitor for signs of aspiration (tachypnea, cough, rales, wheezing, cyanosis, fever).    - Elevate head of bed 30 degrees if patient has tube feeding.  - Monitor tube feeding.  Outcome: Progressing     Problem: Nutrition  Goal: Nutrition/Hydration status is improving  Description: Monitor and assess patient's nutrition/hydration status for malnutrition (ex- brittle hair, bruises, dry skin, pale skin and conjunctiva, muscle wasting, smooth red tongue, and disorientation). Collaborate with interdisciplinary team and initiate plan and interventions as ordered.  Monitor patient's weight and dietary intake as ordered or per policy. Utilize nutrition screening tool and intervene per policy. Determine patient's food preferences and provide high-protein, high-caloric foods as appropriate.     - Assist patient with eating.  - Allow adequate time for meals.  - Encourage patient to take dietary supplement as ordered.  - Collaborate with clinical nutritionist.  - Include patient/family/caregiver in decisions related to nutrition.  Outcome: Progressing     Problem: Nutrition/Hydration-ADULT  Goal: Nutrient/Hydration intake appropriate for improving, restoring or maintaining nutritional needs  Description: Monitor and assess patient's nutrition/hydration status for malnutrition. Collaborate with interdisciplinary team and initiate plan and interventions as ordered.  Monitor patient's weight and dietary intake as ordered or per policy. Utilize nutrition screening tool and intervene as  necessary. Determine patient's food preferences and provide high-protein, high-caloric foods as appropriate.     INTERVENTIONS:  - Monitor oral intake, urinary output, labs, and treatment plans  - Assess nutrition and hydration status and recommend course of action  - Evaluate amount of meals eaten  - Assist patient with eating if necessary   - Allow adequate time for meals  - Recommend/ encourage appropriate diets, oral nutritional supplements, and vitamin/mineral supplements  - Order, calculate, and assess calorie counts as needed  - Recommend, monitor, and adjust tube feedings and TPN/PPN based on assessed needs  - Assess need for intravenous fluids  - Provide specific nutrition/hydration education as appropriate  - Include patient/family/caregiver in decisions related to nutrition  Outcome: Progressing

## 2023-12-29 PROCEDURE — 99232 SBSQ HOSP IP/OBS MODERATE 35: CPT | Performed by: HOSPITALIST

## 2023-12-29 RX ADMIN — ENOXAPARIN SODIUM 40 MG: 40 INJECTION SUBCUTANEOUS at 09:01

## 2023-12-29 RX ADMIN — OLANZAPINE 40 MG: 10 TABLET, FILM COATED ORAL at 21:24

## 2023-12-29 RX ADMIN — ATORVASTATIN CALCIUM 40 MG: 40 TABLET, FILM COATED ORAL at 17:19

## 2023-12-29 RX ADMIN — CLOPIDOGREL BISULFATE 75 MG: 75 TABLET ORAL at 09:01

## 2023-12-29 RX ADMIN — ASPIRIN 81 MG CHEWABLE TABLET 81 MG: 81 TABLET CHEWABLE at 09:01

## 2023-12-29 RX ADMIN — CARBAMAZEPINE 400 MG: 200 TABLET ORAL at 21:24

## 2023-12-29 NOTE — ASSESSMENT & PLAN NOTE
Presented to the ED with 3 days of confusion and slurred speech  CTA head with age-indeterminate hypodensity in left middle cerebral artery territory in temporoparietal junction with focal hyperdense vessel sign indicative of vascular occlusion.  Additionally mild local sulcal effacement favoring a more recent/acute/subacute event  Admitted on stroke pathway and received aspirin and Plavix bolus  Started on aspirin 81 mg daily, plavix 75 mg daily and atorvastatin 40 mg  Continue DAPT  Stat CT head for changes in neuro exam  MRI brain with moderate-sized acute left MCA infarct  Echo obtained, EF 65%, no PFO noted, valves not well visualized  Cardiology consulted for possible HILLARY however unable to be performed due to patient's COVID-positive status  Cardiology called patient's sister on the phone, Martha, regarding possible Zio patch.  She does not wish to pursue outpatient monitoring or HILLARY.  Follow up with neurology as an outpatient  PT/OT recommending home with home health  Discussed with case management, patient's sister reporting that she is unable to care for her any longer and is requesting nursing home placement  Speech evaluated- level 3 dysphagia    No issues today  Will be going to [facility] Sunday

## 2023-12-29 NOTE — PLAN OF CARE
Problem: Potential for Falls  Goal: Patient will remain free of falls  Description: INTERVENTIONS:  - Educate patient/family on patient safety including physical limitations  - Instruct patient to call for assistance with activity   - Consult OT/PT to assist with strengthening/mobility   - Keep Call bell within reach  - Keep bed low and locked with side rails adjusted as appropriate  - Keep care items and personal belongings within reach  - Initiate and maintain comfort rounds  - Make Fall Risk Sign visible to staff  - Offer Toileting every 2 Hours, in advance of need  - Initiate/Maintain bed alarm  - Obtain necessary fall risk management equipment: bed alarm   - Apply yellow socks and bracelet for high fall risk patients  - Consider moving patient to room near nurses station  Outcome: Progressing     Problem: PAIN - ADULT  Goal: Verbalizes/displays adequate comfort level or baseline comfort level  Description: Interventions:  - Encourage patient to monitor pain and request assistance  - Assess pain using appropriate pain scale  - Administer analgesics based on type and severity of pain and evaluate response  - Implement non-pharmacological measures as appropriate and evaluate response  - Consider cultural and social influences on pain and pain management  - Notify physician/advanced practitioner if interventions unsuccessful or patient reports new pain  Outcome: Progressing     Problem: INFECTION - ADULT  Goal: Absence or prevention of progression during hospitalization  Description: INTERVENTIONS:  - Assess and monitor for signs and symptoms of infection  - Monitor lab/diagnostic results  - Monitor all insertion sites, i.e. indwelling lines, tubes, and drains  - Monitor endotracheal if appropriate and nasal secretions for changes in amount and color  - [place] appropriate cooling/warming therapies per order  - Administer medications as ordered  - Instruct and encourage patient and family to use good hand  hygiene technique  - Identify and instruct in appropriate isolation precautions for identified infection/condition  Outcome: Progressing  Goal: Absence of fever/infection during neutropenic period  Description: INTERVENTIONS:  - Monitor WBC    Outcome: Progressing     Problem: SAFETY ADULT  Goal: Patient will remain free of falls  Description: INTERVENTIONS:  - Educate patient/family on patient safety including physical limitations  - Instruct patient to call for assistance with activity   - Consult OT/PT to assist with strengthening/mobility   - Keep Call bell within reach  - Keep bed low and locked with side rails adjusted as appropriate  - Keep care items and personal belongings within reach  - Initiate and maintain comfort rounds  - Make Fall Risk Sign visible to staff  - Offer Toileting every 2 Hours, in advance of need  - Initiate/Maintain bed alarm  - Obtain necessary fall risk management equipment: bed alarm   - Apply yellow socks and bracelet for high fall risk patients  - Consider moving patient to room near nurses station  Outcome: Progressing  Goal: Maintain or return to baseline ADL function  Description: INTERVENTIONS:  -  Assess patient's ability to carry out ADLs; assess patient's baseline for ADL function and identify physical deficits which impact ability to perform ADLs (bathing, care of mouth/teeth, toileting, grooming, dressing, etc.)  - Assess/evaluate cause of self-care deficits   - Assess range of motion  - Assess patient's mobility; develop plan if impaired  - Assess patient's need for assistive devices and provide as appropriate  - Encourage maximum independence but intervene and supervise when necessary  - Involve family in performance of ADLs  - Assess for home care needs following discharge   - Consider OT consult to assist with ADL evaluation and planning for discharge  - Provide patient education as appropriate  Outcome: Progressing  Goal: Maintains/Returns to pre admission functional  level  Description: INTERVENTIONS:  - Perform AM-PAC 6 Click Basic Mobility/ Daily Activity assessment daily.  - Set and communicate daily mobility goal to care team and patient/family/caregiver.   - Collaborate with rehabilitation services on mobility goals if consulted  - Perform Range of Motion 4 times a day.  - Reposition patient every 2 hours.  - Dangle patient 4 times a day  - Stand patient 4 times a day  - Ambulate patient 4 times a day  - Out of bed to chair 4 times a day   - Out of bed for meals 4 times a day  - Out of bed for toileting  - Record patient progress and toleration of activity level   Outcome: Progressing     Problem: DISCHARGE PLANNING  Goal: Discharge to home or other facility with appropriate resources  Description: INTERVENTIONS:  - Identify barriers to discharge w/patient and caregiver  - Arrange for needed discharge resources and transportation as appropriate  - Identify discharge learning needs (meds, wound care, etc.)  - Arrange for interpretive services to assist at discharge as needed  - Refer to Case Management Department for coordinating discharge planning if the patient needs post-hospital services based on physician/advanced practitioner order or complex needs related to functional status, cognitive ability, or social support system  Outcome: Progressing     Problem: Knowledge Deficit  Goal: Patient/family/caregiver demonstrates understanding of disease process, treatment plan, medications, and discharge instructions  Description: Complete learning assessment and assess knowledge base.  Interventions:  - Provide teaching at level of understanding  - Provide teaching via preferred learning methods  Outcome: Progressing     Problem: Neurological Deficit  Goal: Neurological status is stable or improving  Description: Interventions:  - Monitor and assess patient's level of consciousness, motor function, sensory function, and level of assistance needed for ADLs.   - Monitor and report  changes from baseline. Collaborate with interdisciplinary team to initiate plan and implement interventions as ordered.   - Provide and maintain a safe environment.  - Consider seizure precautions.  - Consider fall precautions.  - Consider aspiration precautions.  - Consider bleeding precautions.  Outcome: Progressing     Problem: Activity Intolerance/Impaired Mobility  Goal: Mobility/activity is maintained at optimum level for patient  Description: Interventions:  - Assess and monitor patient  barriers to mobility and need for assistive/adaptive devices.  - Assess patient's emotional response to limitations.  - Collaborate with interdisciplinary team and initiate plans and interventions as ordered.  - Encourage independent activity per ability.  - Maintain proper body alignment.  - Perform active/passive rom as tolerated/ordered.  - Plan activities to conserve energy.  - Turn patient as appropriate  Outcome: Progressing     Problem: Communication Impairment  Goal: Ability to express needs and understand communication  Description: Assess patient's communication skills and ability to understand information.  Patient will demonstrate use of effective communication techniques, alternative methods of communication and understanding even if not able to speak.     - Encourage communication and provide alternate methods of communication as needed.  - Collaborate with case management/ for discharge needs.  - Include patient/family/caregiver in decisions related to communication.  Outcome: Progressing     Problem: Potential for Aspiration  Goal: Non-ventilated patient's risk of aspiration is minimized  Description: Assess and monitor vital signs, respiratory status, and labs (WBC).  Monitor for signs of aspiration (tachypnea, cough, rales, wheezing, cyanosis, fever).    - Assess and monitor patient's ability to swallow.  - Place patient up in chair to eat if possible.  - HOB up at 90 degrees to eat if unable  to get patient up into chair.  - Supervise patient during oral intake.   - Instruct patient/ family to take small bites.  - Instruct patient/ family to take small single sips when taking liquids.  - Follow patient-specific strategies generated by speech pathologist.  Outcome: Progressing  Goal: Ventilated patient's risk of aspiration is minimized  Description: Assess and monitor vital signs, respiratory status, airway cuff pressure, and labs (WBC).  Monitor for signs of aspiration (tachypnea, cough, rales, wheezing, cyanosis, fever).    - Elevate head of bed 30 degrees if patient has tube feeding.  - Monitor tube feeding.  Outcome: Progressing     Problem: Nutrition  Goal: Nutrition/Hydration status is improving  Description: Monitor and assess patient's nutrition/hydration status for malnutrition (ex- brittle hair, bruises, dry skin, pale skin and conjunctiva, muscle wasting, smooth red tongue, and disorientation). Collaborate with interdisciplinary team and initiate plan and interventions as ordered.  Monitor patient's weight and dietary intake as ordered or per policy. Utilize nutrition screening tool and intervene per policy. Determine patient's food preferences and provide high-protein, high-caloric foods as appropriate.     - Assist patient with eating.  - Allow adequate time for meals.  - Encourage patient to take dietary supplement as ordered.  - Collaborate with clinical nutritionist.  - Include patient/family/caregiver in decisions related to nutrition.  Outcome: Progressing     Problem: Nutrition/Hydration-ADULT  Goal: Nutrient/Hydration intake appropriate for improving, restoring or maintaining nutritional needs  Description: Monitor and assess patient's nutrition/hydration status for malnutrition. Collaborate with interdisciplinary team and initiate plan and interventions as ordered.  Monitor patient's weight and dietary intake as ordered or per policy. Utilize nutrition screening tool and intervene as  necessary. Determine patient's food preferences and provide high-protein, high-caloric foods as appropriate.     INTERVENTIONS:  - Monitor oral intake, urinary output, labs, and treatment plans  - Assess nutrition and hydration status and recommend course of action  - Evaluate amount of meals eaten  - Assist patient with eating if necessary   - Allow adequate time for meals  - Recommend/ encourage appropriate diets, oral nutritional supplements, and vitamin/mineral supplements  - Order, calculate, and assess calorie counts as needed  - Recommend, monitor, and adjust tube feedings and TPN/PPN based on assessed needs  - Assess need for intravenous fluids  - Provide specific nutrition/hydration education as appropriate  - Include patient/family/caregiver in decisions related to nutrition  Outcome: Progressing

## 2023-12-29 NOTE — ASSESSMENT & PLAN NOTE
Presented to the hospital with slurred speech and confusion x 3 days  Found to be COVID-positive  Asymptomatic and not hypoxic  No indication for covid specific treatment.    No SOB  No cough

## 2023-12-29 NOTE — PLAN OF CARE
Problem: Potential for Falls  Goal: Patient will remain free of falls  Description: INTERVENTIONS:  - Educate patient/family on patient safety including physical limitations  - Instruct patient to call for assistance with activity   - Consult OT/PT to assist with strengthening/mobility   - Keep Call bell within reach  - Keep bed low and locked with side rails adjusted as appropriate  - Keep care items and personal belongings within reach  - Initiate and maintain comfort rounds  - Make Fall Risk Sign visible to staff  - Offer Toileting every  Hours, in advance of need  - Initiate/Maintain alarm  - Obtain necessary fall risk management equipment:   - Apply yellow socks and bracelet for high fall risk patients  - Consider moving patient to room near nurses station  Outcome: Progressing     Problem: PAIN - ADULT  Goal: Verbalizes/displays adequate comfort level or baseline comfort level  Description: Interventions:  - Encourage patient to monitor pain and request assistance  - Assess pain using appropriate pain scale  - Administer analgesics based on type and severity of pain and evaluate response  - Implement non-pharmacological measures as appropriate and evaluate response  - Consider cultural and social influences on pain and pain management  - Notify physician/advanced practitioner if interventions unsuccessful or patient reports new pain  Outcome: Progressing     Problem: INFECTION - ADULT  Goal: Absence or prevention of progression during hospitalization  Description: INTERVENTIONS:  - Assess and monitor for signs and symptoms of infection  - Monitor lab/diagnostic results  - Monitor all insertion sites, i.e. indwelling lines, tubes, and drains  - Monitor endotracheal if appropriate and nasal secretions for changes in amount and color  - [place] appropriate cooling/warming therapies per order  - Administer medications as ordered  - Instruct and encourage patient and family to use good hand hygiene technique  -  Identify and instruct in appropriate isolation precautions for identified infection/condition  Outcome: Progressing  Goal: Absence of fever/infection during neutropenic period  Description: INTERVENTIONS:  - Monitor WBC    Outcome: Progressing     Problem: SAFETY ADULT  Goal: Patient will remain free of falls  Description: INTERVENTIONS:  - Educate patient/family on patient safety including physical limitations  - Instruct patient to call for assistance with activity   - Consult OT/PT to assist with strengthening/mobility   - Keep Call bell within reach  - Keep bed low and locked with side rails adjusted as appropriate  - Keep care items and personal belongings within reach  - Initiate and maintain comfort rounds  - Make Fall Risk Sign visible to staff  - Offer Toileting every  Hours, in advance of need  - Initiate/Maintain alarm  - Obtain necessary fall risk management equipment:   - Apply yellow socks and bracelet for high fall risk patients  - Consider moving patient to room near nurses station  Outcome: Progressing  Goal: Maintain or return to baseline ADL function  Description: INTERVENTIONS:  -  Assess patient's ability to carry out ADLs; assess patient's baseline for ADL function and identify physical deficits which impact ability to perform ADLs (bathing, care of mouth/teeth, toileting, grooming, dressing, etc.)  - Assess/evaluate cause of self-care deficits   - Assess range of motion  - Assess patient's mobility; develop plan if impaired  - Assess patient's need for assistive devices and provide as appropriate  - Encourage maximum independence but intervene and supervise when necessary  - Involve family in performance of ADLs  - Assess for home care needs following discharge   - Consider OT consult to assist with ADL evaluation and planning for discharge  - Provide patient education as appropriate  Outcome: Progressing  Goal: Maintains/Returns to pre admission functional level  Description:  INTERVENTIONS:  - Perform AM-PAC 6 Click Basic Mobility/ Daily Activity assessment daily.  - Set and communicate daily mobility goal to care team and patient/family/caregiver.   - Collaborate with rehabilitation services on mobility goals if consulted  - Perform Range of Motion  times a day.  - Reposition patient every  hours.  - Dangle patient  times a day  - Stand patient  times a day  - Ambulate patient  times a day  - Out of bed to chair  times a day   - Out of bed for meals  times a day  - Out of bed for toileting  - Record patient progress and toleration of activity level   Outcome: Progressing     Problem: DISCHARGE PLANNING  Goal: Discharge to home or other facility with appropriate resources  Description: INTERVENTIONS:  - Identify barriers to discharge w/patient and caregiver  - Arrange for needed discharge resources and transportation as appropriate  - Identify discharge learning needs (meds, wound care, etc.)  - Arrange for interpretive services to assist at discharge as needed  - Refer to Case Management Department for coordinating discharge planning if the patient needs post-hospital services based on physician/advanced practitioner order or complex needs related to functional status, cognitive ability, or social support system  Outcome: Progressing     Problem: Knowledge Deficit  Goal: Patient/family/caregiver demonstrates understanding of disease process, treatment plan, medications, and discharge instructions  Description: Complete learning assessment and assess knowledge base.  Interventions:  - Provide teaching at level of understanding  - Provide teaching via preferred learning methods  Outcome: Progressing     Problem: Neurological Deficit  Goal: Neurological status is stable or improving  Description: Interventions:  - Monitor and assess patient's level of consciousness, motor function, sensory function, and level of assistance needed for ADLs.   - Monitor and report changes from baseline.  Collaborate with interdisciplinary team to initiate plan and implement interventions as ordered.   - Provide and maintain a safe environment.  - Consider seizure precautions.  - Consider fall precautions.  - Consider aspiration precautions.  - Consider bleeding precautions.  Outcome: Progressing     Problem: Activity Intolerance/Impaired Mobility  Goal: Mobility/activity is maintained at optimum level for patient  Description: Interventions:  - Assess and monitor patient  barriers to mobility and need for assistive/adaptive devices.  - Assess patient's emotional response to limitations.  - Collaborate with interdisciplinary team and initiate plans and interventions as ordered.  - Encourage independent activity per ability.  - Maintain proper body alignment.  - Perform active/passive rom as tolerated/ordered.  - Plan activities to conserve energy.  - Turn patient as appropriate  Outcome: Progressing     Problem: Communication Impairment  Goal: Ability to express needs and understand communication  Description: Assess patient's communication skills and ability to understand information.  Patient will demonstrate use of effective communication techniques, alternative methods of communication and understanding even if not able to speak.     - Encourage communication and provide alternate methods of communication as needed.  - Collaborate with case management/ for discharge needs.  - Include patient/family/caregiver in decisions related to communication.  Outcome: Progressing     Problem: Potential for Aspiration  Goal: Non-ventilated patient's risk of aspiration is minimized  Description: Assess and monitor vital signs, respiratory status, and labs (WBC).  Monitor for signs of aspiration (tachypnea, cough, rales, wheezing, cyanosis, fever).    - Assess and monitor patient's ability to swallow.  - Place patient up in chair to eat if possible.  - HOB up at 90 degrees to eat if unable to get patient up into  chair.  - Supervise patient during oral intake.   - Instruct patient/ family to take small bites.  - Instruct patient/ family to take small single sips when taking liquids.  - Follow patient-specific strategies generated by speech pathologist.  Outcome: Progressing  Goal: Ventilated patient's risk of aspiration is minimized  Description: Assess and monitor vital signs, respiratory status, airway cuff pressure, and labs (WBC).  Monitor for signs of aspiration (tachypnea, cough, rales, wheezing, cyanosis, fever).    - Elevate head of bed 30 degrees if patient has tube feeding.  - Monitor tube feeding.  Outcome: Progressing     Problem: Nutrition  Goal: Nutrition/Hydration status is improving  Description: Monitor and assess patient's nutrition/hydration status for malnutrition (ex- brittle hair, bruises, dry skin, pale skin and conjunctiva, muscle wasting, smooth red tongue, and disorientation). Collaborate with interdisciplinary team and initiate plan and interventions as ordered.  Monitor patient's weight and dietary intake as ordered or per policy. Utilize nutrition screening tool and intervene per policy. Determine patient's food preferences and provide high-protein, high-caloric foods as appropriate.     - Assist patient with eating.  - Allow adequate time for meals.  - Encourage patient to take dietary supplement as ordered.  - Collaborate with clinical nutritionist.  - Include patient/family/caregiver in decisions related to nutrition.  Outcome: Progressing     Problem: Nutrition/Hydration-ADULT  Goal: Nutrient/Hydration intake appropriate for improving, restoring or maintaining nutritional needs  Description: Monitor and assess patient's nutrition/hydration status for malnutrition. Collaborate with interdisciplinary team and initiate plan and interventions as ordered.  Monitor patient's weight and dietary intake as ordered or per policy. Utilize nutrition screening tool and intervene as necessary. Determine  patient's food preferences and provide high-protein, high-caloric foods as appropriate.     INTERVENTIONS:  - Monitor oral intake, urinary output, labs, and treatment plans  - Assess nutrition and hydration status and recommend course of action  - Evaluate amount of meals eaten  - Assist patient with eating if necessary   - Allow adequate time for meals  - Recommend/ encourage appropriate diets, oral nutritional supplements, and vitamin/mineral supplements  - Order, calculate, and assess calorie counts as needed  - Recommend, monitor, and adjust tube feedings and TPN/PPN based on assessed needs  - Assess need for intravenous fluids  - Provide specific nutrition/hydration education as appropriate  - Include patient/family/caregiver in decisions related to nutrition  Outcome: Progressing

## 2023-12-29 NOTE — PROGRESS NOTES
[facility]  Progress Note  Name: Elvi Castillo I  MRN: [id number]  Unit/Bed#: E4 -01 I Date of Admission: 2023   Date of Service: 2023 I Hospital Day: 9    Assessment/Plan   * Acute ischemic left MCA stroke (HCC)  Assessment & Plan  Presented to the ED with 3 days of confusion and slurred speech  CTA head with age-indeterminate hypodensity in left middle cerebral artery territory in temporoparietal junction with focal hyperdense vessel sign indicative of vascular occlusion.  Additionally mild local sulcal effacement favoring a more recent/acute/subacute event  Admitted on stroke pathway and received aspirin and Plavix bolus  Started on aspirin 81 mg daily, plavix 75 mg daily and atorvastatin 40 mg  Continue DAPT  Stat CT head for changes in neuro exam  MRI brain with moderate-sized acute left MCA infarct  Echo obtained, EF 65%, no PFO noted, valves not well visualized  Cardiology consulted for possible HILLARY however unable to be performed due to patient's COVID-positive status  Cardiology called patient's sister on the phone, Martha, regarding possible Zio patch.  She does not wish to pursue outpatient monitoring or HILLARY.  Follow up with neurology as an outpatient  PT/OT recommending home with home health  Discussed with case management, patient's sister reporting that she is unable to care for her any longer and is requesting nursing home placement  Speech evaluated- level 3 dysphagia    No issues today  Will be going to STR     COVID-19  Assessment & Plan  Presented to the hospital with slurred speech and confusion x 3 days  Found to be COVID-positive  Asymptomatic and not hypoxic  No indication for covid specific treatment.    No SOB  No cough               Subjective:   Feels well  No complaints  .      Objective:     Vitals:   Temp (24hrs), Av.4 °F (36.9 °C), Min:98.1 °F (36.7 °C), Max:98.6 °F (37 °C)    Temp:  [98.1 °F (36.7 °C)-98.6 °F (37 °C)] 98.5 °F  (36.9 °C)  HR:  [] 91  Resp:  [18] 18  BP: ()/(60-71) 148/71  SpO2:  [97 %-98 %] 98 %  Body mass index is 20.9 kg/m².     Input and Output Summary (last 24 hours):     No intake or output data in the 24 hours ending 12/29/23 1617    Physical Exam:     Physical Exam  Vitals and nursing note reviewed.   HENT:      Head: Normocephalic and atraumatic.   Eyes:      Pupils: Pupils are equal, round, and reactive to light.   Cardiovascular:      Rate and Rhythm: Normal rate and regular rhythm.      Heart sounds: No murmur heard.     No friction rub. No gallop.   Pulmonary:      Effort: Pulmonary effort is normal.      Breath sounds: Normal breath sounds. No wheezing or rales.   Abdominal:      General: Bowel sounds are normal.      Palpations: Abdomen is soft.      Tenderness: There is no abdominal tenderness.   Musculoskeletal:      Right lower leg: No edema.      Left lower leg: No edema.       .       Additional Data:     Labs:    Results from last 7 days   Lab Units 12/26/23  0443   WBC Thousand/uL 6.46   HEMOGLOBIN g/dL 11.6   HEMATOCRIT % 34.4*   PLATELETS Thousands/uL 229     Results from last 7 days   Lab Units 12/26/23  0443   POTASSIUM mmol/L 4.1   CHLORIDE mmol/L 104   CO2 mmol/L 28   BUN mg/dL 21   CREATININE mg/dL 0.73   CALCIUM mg/dL 8.4                       * I Have Reviewed All Lab Data     Recent Cultures (last 7 days):             Last 24 Hours Medication List:   Current Facility-Administered Medications   Medication Dose Route Frequency Provider Last Rate    amLODIPine  5 mg Oral Daily Truong Lombardo PA-C      aspirin  81 mg Oral Daily Jigna Salmon PA-C      atorvastatin  40 mg Oral QPM Jigna Salmon PA-C      carBAMazepine  400 mg Oral HS Jigna Salmon PA-C      clopidogrel  75 mg Oral Daily Truong Lombardo PA-C      enoxaparin  40 mg Subcutaneous Daily Jigna Salmon PA-C      OLANZapine  40 mg Oral HS Jigna Salmon PA-C      ondansetron  4 mg Intravenous Q6H PRN Jigna Salmon PA-C            VTE Pharmacologic Prophylaxis:   Pharmacologic: Enoxaparin (Lovenox)      Current Length of Stay: 9 day(s)    Current Patient Status: Inpatient       Discharge Plan: STR Sunday.      Code Status: Level 3 - DNAR and DNI           Today, Patient Was Seen By: Adams Geller DO    ** Please Note: Dictation voice to text software may have been used in the creation of this document. **

## 2023-12-30 PROCEDURE — 99232 SBSQ HOSP IP/OBS MODERATE 35: CPT | Performed by: HOSPITALIST

## 2023-12-30 RX ADMIN — ASPIRIN 81 MG CHEWABLE TABLET 81 MG: 81 TABLET CHEWABLE at 08:23

## 2023-12-30 RX ADMIN — CARBAMAZEPINE 400 MG: 200 TABLET ORAL at 21:44

## 2023-12-30 RX ADMIN — ENOXAPARIN SODIUM 40 MG: 40 INJECTION SUBCUTANEOUS at 08:23

## 2023-12-30 RX ADMIN — AMLODIPINE BESYLATE 5 MG: 5 TABLET ORAL at 08:23

## 2023-12-30 RX ADMIN — ATORVASTATIN CALCIUM 40 MG: 40 TABLET, FILM COATED ORAL at 17:19

## 2023-12-30 RX ADMIN — OLANZAPINE 40 MG: 10 TABLET, FILM COATED ORAL at 21:44

## 2023-12-30 RX ADMIN — CLOPIDOGREL BISULFATE 75 MG: 75 TABLET ORAL at 08:24

## 2023-12-30 NOTE — PROGRESS NOTES
[facility]  Progress Note  Name: Elvi Castillo I  MRN: [id number]  Unit/Bed#: E4 -01 I Date of Admission: 2023   Date of Service: 2023 I Hospital Day: 10    Assessment/Plan   * Acute ischemic left MCA stroke (HCC)  Assessment & Plan  Presented to the ED with 3 days of confusion and slurred speech  CTA head with age-indeterminate hypodensity in left middle cerebral artery territory in temporoparietal junction with focal hyperdense vessel sign indicative of vascular occlusion.  Additionally mild local sulcal effacement favoring a more recent/acute/subacute event  Admitted on stroke pathway and received aspirin and Plavix bolus  Started on aspirin 81 mg daily, plavix 75 mg daily and atorvastatin 40 mg  Continue DAPT  Stat CT head for changes in neuro exam  MRI brain with moderate-sized acute left MCA infarct  Echo obtained, EF 65%, no PFO noted, valves not well visualized  Cardiology consulted for possible HILLARY however unable to be performed due to patient's COVID-positive status  Cardiology called patient's sister on the phone, Martha, regarding possible Zio patch.  She does not wish to pursue outpatient monitoring or HILLARY.  Follow up with neurology as an outpatient  PT/OT recommending home with home health  Discussed with case management, patient's sister reporting that she is unable to care for her any longer and is requesting nursing home placement  Speech evaluated- level 3 dysphagia    Doing well  No SOB  Going to STR tomorrow.               Subjective:   Feels well  No sob        Objective:     Vitals:   Temp (24hrs), Av.4 °F (36.9 °C), Min:97.5 °F (36.4 °C), Max:99.2 °F (37.3 °C)    Temp:  [97.5 °F (36.4 °C)-99.2 °F (37.3 °C)] 97.5 °F (36.4 °C)  HR:  [75-86] 86  Resp:  [18-20] 18  BP: (136-140)/(66-86) 140/77  SpO2:  [97 %-99 %] 99 %  Body mass index is 20.9 kg/m².     Input and Output Summary (last 24 hours):     No intake or output data in the 24 hours  ending 12/30/23 1554    Physical Exam:     Physical Exam  Vitals and nursing note reviewed.   HENT:      Head: Normocephalic and atraumatic.   Eyes:      Pupils: Pupils are equal, round, and reactive to light.   Cardiovascular:      Rate and Rhythm: Normal rate and regular rhythm.      Heart sounds: No murmur heard.     No friction rub. No gallop.   Pulmonary:      Effort: Pulmonary effort is normal.      Breath sounds: Normal breath sounds. No wheezing or rales.   Abdominal:      General: Bowel sounds are normal.      Palpations: Abdomen is soft.      Tenderness: There is no abdominal tenderness.   Musculoskeletal:      Right lower leg: No edema.      Left lower leg: No edema.       .       Additional Data:     Labs:    Results from last 7 days   Lab Units 12/26/23  0443   WBC Thousand/uL 6.46   HEMOGLOBIN g/dL 11.6   HEMATOCRIT % 34.4*   PLATELETS Thousands/uL 229     Results from last 7 days   Lab Units 12/26/23  0443   POTASSIUM mmol/L 4.1   CHLORIDE mmol/L 104   CO2 mmol/L 28   BUN mg/dL 21   CREATININE mg/dL 0.73   CALCIUM mg/dL 8.4                       * I Have Reviewed All Lab Data     Recent Cultures (last 7 days):             Last 24 Hours Medication List:   Current Facility-Administered Medications   Medication Dose Route Frequency Provider Last Rate    amLODIPine  5 mg Oral Daily Truong Lombardo PA-C      aspirin  81 mg Oral Daily BENEDICTO Luu-MELISSA      atorvastatin  40 mg Oral QPM BENEDICTO Luu-MELISSA      carBAMazepine  400 mg Oral HS Jigna Salmon PA-C      clopidogrel  75 mg Oral Daily Truong Lombardo, PA-C      enoxaparin  40 mg Subcutaneous Daily BENEDICTO Luu-C      OLANZapine  40 mg Oral HS BENEDICTO Luu-MELISSA      ondansetron  4 mg Intravenous Q6H PRN Jigna Salmon PA-C           VTE Pharmacologic Prophylaxis:   Pharmacologic: Enoxaparin (Lovenox)      Current Length of Stay: 10 day(s)    Current Patient Status: Inpatient       Discharge Plan: to STR tomorrow    Code Status: Level 3 - DNAR and  DNI           Today, Patient Was Seen By: Adams Geller DO    ** Please Note: Dictation voice to text software may have been used in the creation of this document. **

## 2023-12-30 NOTE — PLAN OF CARE
Problem: Potential for Falls  Goal: Patient will remain free of falls  Description: INTERVENTIONS:  - Educate patient/family on patient safety including physical limitations  - Instruct patient to call for assistance with activity   - Consult OT/PT to assist with strengthening/mobility   - Keep Call bell within reach  - Keep bed low and locked with side rails adjusted as appropriate  - Keep care items and personal belongings within reach  - Initiate and maintain comfort rounds  - Make Fall Risk Sign visible to staff  - Offer Toileting every 2 Hours, in advance of need  - Initiate/Maintain bed alarm  - Obtain necessary fall risk management equipment:   - Apply yellow socks and bracelet for high fall risk patients  - Consider moving patient to room near nurses station  Outcome: Progressing     Problem: PAIN - ADULT  Goal: Verbalizes/displays adequate comfort level or baseline comfort level  Description: Interventions:  - Encourage patient to monitor pain and request assistance  - Assess pain using appropriate pain scale  - Administer analgesics based on type and severity of pain and evaluate response  - Implement non-pharmacological measures as appropriate and evaluate response  - Consider cultural and social influences on pain and pain management  - Notify physician/advanced practitioner if interventions unsuccessful or patient reports new pain  Outcome: Progressing     Problem: INFECTION - ADULT  Goal: Absence or prevention of progression during hospitalization  Description: INTERVENTIONS:  - Assess and monitor for signs and symptoms of infection  - Monitor lab/diagnostic results  - Monitor all insertion sites, i.e. indwelling lines, tubes, and drains  - Monitor endotracheal if appropriate and nasal secretions for changes in amount and color  - [place] appropriate cooling/warming therapies per order  - Administer medications as ordered  - Instruct and encourage patient and family to use good hand hygiene  technique  - Identify and instruct in appropriate isolation precautions for identified infection/condition  Outcome: Progressing  Goal: Absence of fever/infection during neutropenic period  Description: INTERVENTIONS:  - Monitor WBC    Outcome: Progressing     Problem: SAFETY ADULT  Goal: Patient will remain free of falls  Description: INTERVENTIONS:  - Educate patient/family on patient safety including physical limitations  - Instruct patient to call for assistance with activity   - Consult OT/PT to assist with strengthening/mobility   - Keep Call bell within reach  - Keep bed low and locked with side rails adjusted as appropriate  - Keep care items and personal belongings within reach  - Initiate and maintain comfort rounds  - Make Fall Risk Sign visible to staff  - Offer Toileting every 2 Hours, in advance of need  - Initiate/Maintain bed alarm  - Obtain necessary fall risk management equipment:   - Apply yellow socks and bracelet for high fall risk patients  - Consider moving patient to room near nurses station  Outcome: Progressing  Goal: Maintain or return to baseline ADL function  Description: INTERVENTIONS:  -  Assess patient's ability to carry out ADLs; assess patient's baseline for ADL function and identify physical deficits which impact ability to perform ADLs (bathing, care of mouth/teeth, toileting, grooming, dressing, etc.)  - Assess/evaluate cause of self-care deficits   - Assess range of motion  - Assess patient's mobility; develop plan if impaired  - Assess patient's need for assistive devices and provide as appropriate  - Encourage maximum independence but intervene and supervise when necessary  - Involve family in performance of ADLs  - Assess for home care needs following discharge   - Consider OT consult to assist with ADL evaluation and planning for discharge  - Provide patient education as appropriate  Outcome: Progressing  Goal: Maintains/Returns to pre admission functional  level  Description: INTERVENTIONS:  - Perform AM-PAC 6 Click Basic Mobility/ Daily Activity assessment daily.  - Set and communicate daily mobility goal to care team and patient/family/caregiver.   - Collaborate with rehabilitation services on mobility goals if consulted  - Perform Range of Motion 3 times a day.  - Reposition patient every 2 hours.  - Dangle patient 3 times a day  - Stand patient 3 times a day  - Ambulate patient 3 times a day  - Out of bed to chair 3 times a day   - Out of bed for meals 3 times a day  - Out of bed for toileting  - Record patient progress and toleration of activity level   Outcome: Progressing

## 2023-12-30 NOTE — ASSESSMENT & PLAN NOTE
Presented to the ED with 3 days of confusion and slurred speech  CTA head with age-indeterminate hypodensity in left middle cerebral artery territory in temporoparietal junction with focal hyperdense vessel sign indicative of vascular occlusion.  Additionally mild local sulcal effacement favoring a more recent/acute/subacute event  Admitted on stroke pathway and received aspirin and Plavix bolus  Started on aspirin 81 mg daily, plavix 75 mg daily and atorvastatin 40 mg  Continue DAPT  Stat CT head for changes in neuro exam  MRI brain with moderate-sized acute left MCA infarct  Echo obtained, EF 65%, no PFO noted, valves not well visualized  Cardiology consulted for possible HILLARY however unable to be performed due to patient's COVID-positive status  Cardiology called patient's sister on the phone, Martha, regarding possible Zio patch.  She does not wish to pursue outpatient monitoring or HILLARY.  Follow up with neurology as an outpatient  PT/OT recommending home with home health  Discussed with case management, patient's sister reporting that she is unable to care for her any longer and is requesting nursing home placement  Speech evaluated- level 3 dysphagia    Doing well  No SOB  Going to [facility] tomorrow.

## 2023-12-31 VITALS
TEMPERATURE: 97.6 F | HEART RATE: 85 BPM | SYSTOLIC BLOOD PRESSURE: 124 MMHG | OXYGEN SATURATION: 97 % | RESPIRATION RATE: 18 BRPM | BODY MASS INDEX: 20.91 KG/M2 | HEIGHT: 63 IN | DIASTOLIC BLOOD PRESSURE: 84 MMHG | WEIGHT: 118 LBS

## 2023-12-31 LAB — GLUCOSE SERPL-MCNC: 87 MG/DL (ref 65–140)

## 2023-12-31 PROCEDURE — 99239 HOSP IP/OBS DSCHRG MGMT >30: CPT | Performed by: HOSPITALIST

## 2023-12-31 PROCEDURE — 82948 REAGENT STRIP/BLOOD GLUCOSE: CPT

## 2023-12-31 RX ORDER — CLOPIDOGREL BISULFATE 75 MG/1
75 TABLET ORAL DAILY
Start: 2024-01-01

## 2023-12-31 RX ORDER — ATORVASTATIN CALCIUM 40 MG/1
40 TABLET, FILM COATED ORAL EVERY EVENING
Start: 2023-12-31

## 2023-12-31 RX ADMIN — ENOXAPARIN SODIUM 40 MG: 40 INJECTION SUBCUTANEOUS at 09:05

## 2023-12-31 RX ADMIN — ASPIRIN 81 MG CHEWABLE TABLET 81 MG: 81 TABLET CHEWABLE at 09:04

## 2023-12-31 RX ADMIN — CLOPIDOGREL BISULFATE 75 MG: 75 TABLET ORAL at 09:05

## 2023-12-31 RX ADMIN — AMLODIPINE BESYLATE 5 MG: 5 TABLET ORAL at 09:04

## 2023-12-31 NOTE — ASSESSMENT & PLAN NOTE
Presented to the ED with 3 days of confusion and slurred speech  CTA head with age-indeterminate hypodensity in left middle cerebral artery territory in temporoparietal junction with focal hyperdense vessel sign indicative of vascular occlusion.  Additionally mild local sulcal effacement favoring a more recent/acute/subacute event  Admitted on stroke pathway and received aspirin and Plavix bolus  Started on aspirin 81 mg daily, plavix 75 mg daily and atorvastatin 40 mg  Continue DAPT  Stat CT head for changes in neuro exam  MRI brain with moderate-sized acute left MCA infarct  Echo obtained, EF 65%, no PFO noted, valves not well visualized  Cardiology consulted for possible HILLARY however unable to be performed due to patient's COVID-positive status  Cardiology called patient's sister on the phone, Martha, regarding possible Zio patch.  She does not wish to pursue outpatient monitoring or HILLARY.  Follow up with neurology as an outpatient  PT/OT recommending home with home health  Discussed with case management, patient's sister reporting that she is unable to care for her any longer and is requesting nursing home placement  Speech evaluated- level 3 dysphagia    Doing well  No SOB  Going to short term rehab today.

## 2023-12-31 NOTE — CASE MANAGEMENT
Case Management Discharge Planning Note    Patient name Elvi Castillo  Location [facility] /E4 -* MRN [id number]  : 1955 Date 2023       Current Admission Date: 2023  Current Admission Diagnosis:Acute ischemic left MCA stroke (HCC)   Patient Active Problem List    Diagnosis Date Noted    Acute ischemic left MCA stroke (HCC) 2023    Parotid tumor 2023    COVID-19 2023    History of brain tumor 2023    Hyponatremia 01/15/2021    Femur fracture, right (HCC) 2021    Seizure disorder (HCC) 2020    Increased frequency of urination 03/10/2015    Acute arthritis 2012    Impaired fasting glucose 2012    Hypertension 2012    Schizoaffective disorder (HCC) 05/10/2011      LOS (days): 11  Geometric Mean LOS (GMLOS) (days): 4.6  Days to GMLOS:-6.1     OBJECTIVE:  Risk of Unplanned Readmission Score: 11.89         Current admission status: Inpatient   Preferred Pharmacy:   [facility] - [address] [place] [address]  Phone: [phone] Fax: [phone]    [facility]/pharmacy #[id number] - [place] - [address] [facility]  [address] [place], [place] [address]  Phone: [phone] Fax: [phone]    Primary Care Provider: Zelalem Barnett DO    Primary Insurance: MEDICARE  Secondary Insurance: [place] [employer] [facility]    DISCHARGE DETAILS:    Discharge planning discussed with:: Martha Gonsalez  Freedom of Choice: Yes  Comments - Freedom of Choice: agreeable to Em BUCK contacted family/caregiver?: Yes  Were Treatment Team discharge recommendations reviewed with patient/caregiver?: Yes  Did patient/caregiver verbalize understanding of patient care needs?: N/A- going to facility  Were patient/caregiver advised of the risks associated with not following Treatment Team discharge recommendations?: Yes    Contacts  Patient Contacts: Martha (sister)  Relationship to Patient::  Family  Contact Method: Phone  Phone Number: [phone]  Reason/Outcome: Emergency Contact, Discharge Planning, Continuity of Care    Requested Home Health Care         Is the patient interested in HHC at discharge?: No    DME Referral Provided  Referral made for DME?: No              Treatment Team Recommendation: SNF  Discharge Destination Plan:: SNF  Transport at Discharge : Wheelchair van     Number/Name of Dispatcher: Roundtrip  Transported by (Company and Unit #): [facility] @ 11  ETA of Transport (Date): 12/31/23  ETA of Transport (Time): 1100                       Additional Comments: CM was notified by accepting facility that pt can admit today for rehab. Attending confirmed pt is also medically cleared for d/c. CM set up [facility] transport for 11 AM p/u. Sister Martha informed, denies any questions or concerns.    Accepting Facility Name, City & State : [facility]  Receiving Facility/Agency Phone Number: [phone]  Facility/Agency Fax Number: [phone]

## 2023-12-31 NOTE — PLAN OF CARE
Problem: Potential for Falls  Goal: Patient will remain free of falls  Description: INTERVENTIONS:  - Educate patient/family on patient safety including physical limitations  - Instruct patient to call for assistance with activity   - Consult OT/PT to assist with strengthening/mobility   - Keep Call bell within reach  - Keep bed low and locked with side rails adjusted as appropriate  - Keep care items and personal belongings within reach  - Initiate and maintain comfort rounds  - Make Fall Risk Sign visible to staff  - Apply yellow socks and bracelet for high fall risk patients  - Consider moving patient to room near nurses station  Outcome: Progressing     Problem: PAIN - ADULT  Goal: Verbalizes/displays adequate comfort level or baseline comfort level  Description: Interventions:  - Encourage patient to monitor pain and request assistance  - Assess pain using appropriate pain scale  - Administer analgesics based on type and severity of pain and evaluate response  - Implement non-pharmacological measures as appropriate and evaluate response  - Consider cultural and social influences on pain and pain management  - Notify physician/advanced practitioner if interventions unsuccessful or patient reports new pain  Outcome: Progressing     Problem: SAFETY ADULT  Goal: Patient will remain free of falls  Description: INTERVENTIONS:  - Educate patient/family on patient safety including physical limitations  - Instruct patient to call for assistance with activity   - Consult OT/PT to assist with strengthening/mobility   - Keep Call bell within reach  - Keep bed low and locked with side rails adjusted as appropriate  - Keep care items and personal belongings within reach  - Initiate and maintain comfort rounds  - Make Fall Risk Sign visible to staff  - Apply yellow socks and bracelet for high fall risk patients  - Consider moving patient to room near nurses station  Outcome: Progressing     Problem: DISCHARGE  PLANNING  Goal: Discharge to home or other facility with appropriate resources  Description: INTERVENTIONS:  - Identify barriers to discharge w/patient and caregiver  - Arrange for needed discharge resources and transportation as appropriate  - Identify discharge learning needs (meds, wound care, etc.)  - Arrange for interpretive services to assist at discharge as needed  - Refer to Case Management Department for coordinating discharge planning if the patient needs post-hospital services based on physician/advanced practitioner order or complex needs related to functional status, cognitive ability, or social support system  Outcome: Progressing     Problem: Knowledge Deficit  Goal: Patient/family/caregiver demonstrates understanding of disease process, treatment plan, medications, and discharge instructions  Description: Complete learning assessment and assess knowledge base.  Interventions:  - Provide teaching at level of understanding  - Provide teaching via preferred learning methods  Outcome: Progressing

## 2023-12-31 NOTE — DISCHARGE SUMMARY
Critical access hospital  Discharge- Elvi Castillo 1955, 68 y.o. female MRN: [id number]  Unit/Bed#: E4 -01 Encounter: [id number]  Primary Care Provider: Zelalem Barnett DO   Date and time admitted to hospital: 12/20/2023  9:06 AM    * Acute ischemic left MCA stroke (HCC)  Assessment & Plan  Presented to the ED with 3 days of confusion and slurred speech  CTA head with age-indeterminate hypodensity in left middle cerebral artery territory in temporoparietal junction with focal hyperdense vessel sign indicative of vascular occlusion.  Additionally mild local sulcal effacement favoring a more recent/acute/subacute event  Admitted on stroke pathway and received aspirin and Plavix bolus  Started on aspirin 81 mg daily, plavix 75 mg daily and atorvastatin 40 mg  Continue DAPT  Stat CT head for changes in neuro exam  MRI brain with moderate-sized acute left MCA infarct  Echo obtained, EF 65%, no PFO noted, valves not well visualized  Cardiology consulted for possible HILLARY however unable to be performed due to patient's COVID-positive status  Cardiology called patient's sister on the phone, Martha, regarding possible Zio patch.  She does not wish to pursue outpatient monitoring or HILLARY.  Follow up with neurology as an outpatient  PT/OT recommending home with home health  Discussed with case management, patient's sister reporting that she is unable to care for her any longer and is requesting nursing home placement  Speech evaluated- level 3 dysphagia    Doing well  No SOB  Going to short term rehab today.    COVID-19  Assessment & Plan  Presented to the hospital with slurred speech and confusion x 3 days  Found to be COVID-positive  Asymptomatic and not hypoxic  No indication for covid specific treatment.    No SOB  No cough    She is 10 days from being diagnosed an no longer needs isolation.    Schizoaffective disorder (HCC)  Assessment & Plan  Continue home regimen of Zyprexa 40 mg at  "bedtime    She is calm        Discharging Physician / Practitioner: Adams Geller DO  PCP: Zelalem Barnett DO  Admission Date:   Admission Orders (From admission, onward)       Ordered        12/20/23 1606  Inpatient Admission  Once            12/20/23 1444  Place in Observation  Once                          Discharge Date: 12/31/23    Medical Problems       Resolved Problems  Date Reviewed: 12/27/2023            Resolved    Hypokalemia 12/22/2023     Resolved by  Truong Lombardo PA-C            Consultations During Hospital Stay:  neurology          Reason for Admission: Confusion and speech difficulty      Hospital Course:     Elvi Castillo is a 68 y.o. female patient who originally presented to the hospital on 12/20/2023 due to confusion and speech difficulty.  Found to have an acute ischemic stroke.  Symptoms have been going on for 3 days before she was brought in.  Family is unable to care for her.  She is going to a nursing facility.  She will be on Plavix and statin.  She was incidentally found to be COVID-positive but had no symptoms.  Did not require any oxygen.  She has already been 10 days of isolation in the hospital.  She does not need any further isolation.    Please see above list of diagnoses and related plan for additional information.       Condition at Discharge: stable       Discharge Day Visit / Exam:     Subjective:  doing well  Ready for rehab      Vitals: Blood Pressure: 124/84 (12/31/23 0812)  Pulse: 85 (12/31/23 0812)  Temperature: 97.6 °F (36.4 °C) (12/31/23 0812)  Temp Source: Temporal (12/31/23 0812)  Respirations: 18 (12/31/23 0812)  Height: 5' 3\" (160 cm) (12/22/23 1130)  Weight - Scale: 53.5 kg (118 lb) (12/22/23 1130)  SpO2: 97 % (12/31/23 0812)    Exam:     Physical Exam  Vitals and nursing note reviewed.   HENT:      Head: Normocephalic and atraumatic.   Eyes:      Pupils: Pupils are equal, round, and reactive to light.   Cardiovascular:      Rate and Rhythm: Normal rate and " regular rhythm.      Heart sounds: No murmur heard.     No friction rub. No gallop.   Pulmonary:      Effort: Pulmonary effort is normal.      Breath sounds: Normal breath sounds. No wheezing or rales.   Abdominal:      General: Bowel sounds are normal.      Palpations: Abdomen is soft.      Tenderness: There is no abdominal tenderness.   Musculoskeletal:      Right lower leg: No edema.      Left lower leg: No edema.       .         Discharge instructions/Information to patient and family:   See after visit summary for information provided to patient and family.      Provisions for Follow-Up Care:  See after visit summary for information related to follow-up care and any pertinent home health orders.      Disposition:     Home       Discharge Statement:  I spent 37 minutes discharging the patient. This time was spent on the day of discharge. I had direct contact with the patient on the day of discharge. Greater than 50% of the total time was spent examining patient, answering all patient questions, arranging and discussing plan of care with patient as well as directly providing post-discharge instructions.  Additional time then spent on discharge activities.    Discharge Medications:  See after visit summary for reconciled discharge medications provided to patient and family.      ** Please Note: This note has been constructed using a voice recognition system **

## 2023-12-31 NOTE — ASSESSMENT & PLAN NOTE
Presented to the hospital with slurred speech and confusion x 3 days  Found to be COVID-positive  Asymptomatic and not hypoxic  No indication for covid specific treatment.    No SOB  No cough    She is 10 days from being diagnosed an no longer needs isolation.

## 2024-01-02 ENCOUNTER — TRANSITIONAL CARE MANAGEMENT (OUTPATIENT)
Dept: FAMILY MEDICINE CLINIC | Facility: CLINIC | Age: 69
End: 2024-01-02

## 2024-01-08 ENCOUNTER — TELEPHONE (OUTPATIENT)
Dept: NEUROLOGY | Facility: CLINIC | Age: 69
End: 2024-01-08

## 2024-01-08 NOTE — TELEPHONE ENCOUNTER
"Post CVA Discharge Follow Up  Hospitalization: 12/20/23-12/31/23    Per notes:  \"Recommendations for outpatient neurological follow up have yet to be determined.\"    Meka- can you please provide specific neurology HFU instructions?  Thank you!   "

## 2024-01-10 NOTE — TELEPHONE ENCOUNTER
Post CVA Discharge Follow Up  Hospitalization: 12/20/23-12/31/23    According to chart, patient discharged to [facility].  Called facility and was transferred to the patient's nurse, Kehinde. He denies any new or worsening stroke-like symptoms since discharge.     Ambulation / ADLs:  Patient mobilizing via walker with therapy.  She continues to require assistance with ADLs and tranfers.     Patient maintained on a dysphagia diet.  Reports mixed incontinence.     Medication Review:  Reviewed medications with them. There have not been any medication changes since discharge from the hospital. No reported missed doses, medication side effects, or signs of bleeding.     Last reported /76    Appointments:  Scheduled stroke hospital follow up appointment. Provided appointment details.    There is no estimated discharge date at this time.      He denies any questions or concerns at this time.

## 2025-04-03 ENCOUNTER — TELEPHONE (OUTPATIENT)
Dept: ADMINISTRATIVE | Facility: OTHER | Age: 70
End: 2025-04-03

## 2025-04-03 NOTE — TELEPHONE ENCOUNTER
Patient contacted to schedule Annual Wellness Visit.   A message was left for the patient to return the call.    Thank you.  Aleah Mcmullen MA  [facility]

## 2025-06-25 ENCOUNTER — VBI (OUTPATIENT)
Dept: ADMINISTRATIVE | Facility: OTHER | Age: 70
End: 2025-06-25

## 2025-06-25 NOTE — TELEPHONE ENCOUNTER
06/25/25 3:30 PM    The patient was called and a message was left for patient to return a call to the [facility] Department at [facility]: Phone [phone] .    Thank you.  Shari Byrne MA  [facility]

## 2025-08-19 ENCOUNTER — VBI (OUTPATIENT)
Dept: ADMINISTRATIVE | Facility: OTHER | Age: 70
End: 2025-08-19

## (undated) DEVICE — SAW BLADE OSCILLATING BRAZOL 167

## (undated) DEVICE — ADHESIVE SKIN HIGH VISCOSITY EXOFIN 1ML

## (undated) DEVICE — CAPIT HIP STEM POR PRIMARY

## (undated) DEVICE — 3M™ IOBAN™ 2 ANTIMICROBIAL INCISE DRAPE 6648EZ: Brand: IOBAN™ 2

## (undated) DEVICE — HOOD: Brand: FLYTE, SURGICOOL

## (undated) DEVICE — IMPERVIOUS STOCKINETTE: Brand: DEROYAL

## (undated) DEVICE — PLUMEPEN PRO 10FT

## (undated) DEVICE — GLOVE INDICATOR PI UNDERGLOVE SZ 8.5 BLUE

## (undated) DEVICE — SUT ETHIBOND 5 V-37 30 IN MB66G

## (undated) DEVICE — CHLORAPREP HI-LITE 26ML ORANGE

## (undated) DEVICE — 3000CC GUARDIAN II: Brand: GUARDIAN

## (undated) DEVICE — TIBURON HIP DRAPE WITH POUCHES: Brand: CONVERTORS

## (undated) DEVICE — THE SIMPULSE SOLO SYSTEM WITH ULTREX RETRACTABLE SPLASH SHIELD TIP: Brand: SIMPULSE SOLO

## (undated) DEVICE — ELECTRODE BLADE E-Z CLEAN 6.5IN -0014

## (undated) DEVICE — TRAY FOLEY 16FR URIMETER SURESTEP

## (undated) DEVICE — MEDI-VAC TUBING CONNECTOR 6-IN-1 STRAIGHT: Brand: CARDINAL HEALTH

## (undated) DEVICE — INTENDED FOR TISSUE SEPARATION, AND OTHER PROCEDURES THAT REQUIRE A SHARP SURGICAL BLADE TO PUNCTURE OR CUT.: Brand: BARD-PARKER ® CARBON RIB-BACK BLADES

## (undated) DEVICE — TUBING SUCTION 5MM X 12 FT

## (undated) DEVICE — SUT VICRYL 2-0 CT-2 27 IN J269H

## (undated) DEVICE — DRESSING MEPILEX AG BORDER 4 X 8 IN

## (undated) DEVICE — SYRINGE 30ML LL

## (undated) DEVICE — NEEDLE 18 G X 1 1/2

## (undated) DEVICE — SUT VICRYL 1 CTX 36 IN J977H

## (undated) DEVICE — GLOVE SRG BIOGEL 8

## (undated) DEVICE — COBAN 6 IN STERILE

## (undated) DEVICE — GLOVE SRG BIOGEL 7.5

## (undated) DEVICE — SCD SEQUENTIAL COMPRESSION COMFORT SLEEVE MEDIUM KNEE LENGTH: Brand: KENDALL SCD

## (undated) DEVICE — BETHLEHEM TOTAL HIP, KIT: Brand: CARDINAL HEALTH

## (undated) DEVICE — INTENDED FOR TISSUE SEPARATION, AND OTHER PROCEDURES THAT REQUIRE A SHARP SURGICAL BLADE TO PUNCTURE OR CUT.: Brand: BARD-PARKER SAFETY BLADES SIZE 10, STERILE

## (undated) DEVICE — CAPIT HIP UNIPOLAR HEAD POR PRIMARY

## (undated) DEVICE — 3M™ STERI-DRAPE™ U-DRAPE 1015: Brand: STERI-DRAPE™